# Patient Record
Sex: MALE | Race: WHITE | NOT HISPANIC OR LATINO | Employment: FULL TIME | URBAN - METROPOLITAN AREA
[De-identification: names, ages, dates, MRNs, and addresses within clinical notes are randomized per-mention and may not be internally consistent; named-entity substitution may affect disease eponyms.]

---

## 2022-01-27 ENCOUNTER — HOSPITAL ENCOUNTER (EMERGENCY)
Facility: MEDICAL CENTER | Age: 57
End: 2022-01-27
Payer: COMMERCIAL

## 2022-01-27 ENCOUNTER — OFFICE VISIT (OUTPATIENT)
Dept: URGENT CARE | Facility: CLINIC | Age: 57
End: 2022-01-27
Payer: COMMERCIAL

## 2022-01-27 VITALS
SYSTOLIC BLOOD PRESSURE: 140 MMHG | TEMPERATURE: 100.4 F | HEART RATE: 115 BPM | RESPIRATION RATE: 24 BRPM | OXYGEN SATURATION: 91 % | WEIGHT: 206 LBS | HEIGHT: 70 IN | BODY MASS INDEX: 29.49 KG/M2 | DIASTOLIC BLOOD PRESSURE: 80 MMHG

## 2022-01-27 DIAGNOSIS — R05.9 COUGH: ICD-10-CM

## 2022-01-27 DIAGNOSIS — R00.0 TACHYCARDIA: ICD-10-CM

## 2022-01-27 DIAGNOSIS — R06.00 DYSPNEA, UNSPECIFIED TYPE: ICD-10-CM

## 2022-01-27 DIAGNOSIS — R50.9 FEVER, UNSPECIFIED FEVER CAUSE: ICD-10-CM

## 2022-01-27 PROCEDURE — 99204 OFFICE O/P NEW MOD 45 MIN: CPT | Performed by: NURSE PRACTITIONER

## 2022-01-27 RX ORDER — INSULIN GLARGINE 100 [IU]/ML
20 INJECTION, SOLUTION SUBCUTANEOUS DAILY
COMMUNITY
End: 2022-06-04

## 2022-01-27 RX ORDER — LISINOPRIL 20 MG/1
40 TABLET ORAL DAILY
COMMUNITY
End: 2022-06-04

## 2022-01-27 ASSESSMENT — ENCOUNTER SYMPTOMS
FEVER: 0
CHILLS: 1
NAUSEA: 0
SHORTNESS OF BREATH: 1
COUGH: 1
DIZZINESS: 0
HEADACHES: 0
SORE THROAT: 0
VOMITING: 0

## 2022-01-27 ASSESSMENT — LIFESTYLE VARIABLES: SUBSTANCE_ABUSE: 0

## 2022-01-28 NOTE — PROGRESS NOTES
Reji Madrigal Jr. is a 56 y.o. male who presents for Cough (x 2 weeks, chest cold, cough, congestion)      HPI this new problem. Reji is a 56-year-old male patient presents with a cough for 2 weeks. Cough is been unchanged for the last 2 weeks. 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 He reports that he checks his temperature and a lot of the buildings and is never noted that he had a fever. He has been getting more short of breath with any type of activity. His cough is occasionally productive and worse over the last few days. He denies orthopnea, chest pain, leg swelling. He has no known exposure to persons with Covid infection, influenza or other infections. He is not vaccinated for COVID-19 viral illness. Treatments tried: None. He reports that his symptoms worsened today so he decided to come into urgent care because he can no longer tolerate his cough and mild dyspnea.    Review of Systems   Constitutional: Positive for chills and malaise/fatigue. Negative for fever.   HENT: Negative for sore throat.    Respiratory: Positive for cough and shortness of breath.    Cardiovascular: Negative for chest pain.   Gastrointestinal: Negative for nausea and vomiting.   Neurological: Negative for dizziness and headaches.   Endo/Heme/Allergies: Negative for environmental allergies.   Psychiatric/Behavioral: Negative for substance abuse.       Allergies:       Allergies   Allergen Reactions   • Penicillins Unspecified       PMSFS Hx:  History reviewed. No pertinent past medical history.  History reviewed. No pertinent surgical history.  History reviewed. No pertinent family history.  Social History     Tobacco Use   • Smoking status: Never Smoker   • Smokeless tobacco: Never Used   Substance Use Topics   • Alcohol use: Yes     Comment: occ       Problems:   There is no problem list on file for this patient.      Medications:   Current Outpatient Medications on File Prior to Visit  "  Medication Sig Dispense Refill   • METOPROLOL TARTRATE PO Take  by mouth.     • lisinopril (PRINIVIL) 20 MG Tab Take 40 mg by mouth every day.     • insulin glargine (LANTUS SOLOSTAR) 100 UNIT/ML Solution Pen-injector injection Inject 20 Units under the skin every day.     • metFORMIN (GLUCOPHAGE) 500 MG Tab Take 1,000 mg by mouth 2 times a day with meals.       No current facility-administered medications on file prior to visit.          Objective:     /80   Pulse (!) 112   Temp 38 °C (100.4 °F) (Temporal)   Resp 20   Ht 1.778 m (5' 10\")   Wt 93.4 kg (206 lb)   SpO2 91%   BMI 29.56 kg/m²     Physical Exam  Vitals and nursing note reviewed.   Constitutional:       General: He is in acute distress.      Appearance: He is well-developed and normal weight. He is not toxic-appearing.   HENT:      Head: Normocephalic and atraumatic.      Right Ear: External ear normal.      Left Ear: External ear normal.      Nose: Nose normal.   Eyes:      General:         Right eye: No discharge.         Left eye: No discharge.      Conjunctiva/sclera: Conjunctivae normal.      Pupils: Pupils are equal, round, and reactive to light.   Cardiovascular:      Rate and Rhythm: Normal rate and regular rhythm.   Pulmonary:      Effort: Tachypnea present.      Breath sounds: Decreased breath sounds present. No rhonchi.      Comments: Speaking in full sentences but with some noted mild dyspnea.   Chest:   Breasts:      Right: No supraclavicular adenopathy.      Left: No supraclavicular adenopathy.       Musculoskeletal:      Cervical back: Neck supple.   Lymphadenopathy:      Cervical: No cervical adenopathy.      Upper Body:      Right upper body: No supraclavicular adenopathy.      Left upper body: No supraclavicular adenopathy.   Skin:     General: Skin is warm and dry.      Capillary Refill: Capillary refill takes less than 2 seconds.   Neurological:      Mental Status: He is alert and oriented to person, place, and time. "   Psychiatric:         Mood and Affect: Mood normal.         Assessment /Associated Orders:      1. Cough     2. Fever, unspecified fever cause     3. Dyspnea, unspecified type     4. Tachycardia         Medical Decision Making:      Pt's clinical presentation and exam today indicate a need for higher level of care with further evaluation and/or diagnostics.  Imaging is not available in urgent care tonight. Concern for pt tachycardia, dyspnea, mild hypoxia 89% RA, low fever.  DD: covid pneumonia, CAP or other.   [facility] transfer center was  called to arrange transfer to higher level of care in ER.  Pt is to be transported via POV   I have reiterated to patient that although an Urgent Care to ER transfer was made this will not necessarily expedite the ER process

## 2022-06-03 ENCOUNTER — HOSPITAL ENCOUNTER (OUTPATIENT)
Facility: MEDICAL CENTER | Age: 57
End: 2022-06-20
Attending: EMERGENCY MEDICINE | Admitting: HOSPITALIST
Payer: COMMERCIAL

## 2022-06-03 DIAGNOSIS — I10 PRIMARY HYPERTENSION: ICD-10-CM

## 2022-06-03 DIAGNOSIS — Z79.4 TYPE 2 DIABETES MELLITUS WITH HYPERGLYCEMIA, WITH LONG-TERM CURRENT USE OF INSULIN (HCC): ICD-10-CM

## 2022-06-03 DIAGNOSIS — E11.65 TYPE 2 DIABETES MELLITUS WITH HYPERGLYCEMIA, WITH LONG-TERM CURRENT USE OF INSULIN (HCC): ICD-10-CM

## 2022-06-03 DIAGNOSIS — I63.9 CEREBROVASCULAR ACCIDENT (CVA), UNSPECIFIED MECHANISM (HCC): ICD-10-CM

## 2022-06-03 LAB
BASOPHILS # BLD AUTO: 0.4 % (ref 0–1.8)
BASOPHILS # BLD: 0.04 K/UL (ref 0–0.12)
EOSINOPHIL # BLD AUTO: 0.12 K/UL (ref 0–0.51)
EOSINOPHIL NFR BLD: 1.2 % (ref 0–6.9)
ERYTHROCYTE [DISTWIDTH] IN BLOOD BY AUTOMATED COUNT: 40.8 FL (ref 35.9–50)
HCT VFR BLD AUTO: 48.7 % (ref 42–52)
HGB BLD-MCNC: 16.7 G/DL (ref 14–18)
IMM GRANULOCYTES # BLD AUTO: 0.03 K/UL (ref 0–0.11)
IMM GRANULOCYTES NFR BLD AUTO: 0.3 % (ref 0–0.9)
LYMPHOCYTES # BLD AUTO: 3.12 K/UL (ref 1–4.8)
LYMPHOCYTES NFR BLD: 32.5 % (ref 22–41)
MCH RBC QN AUTO: 30.3 PG (ref 27–33)
MCHC RBC AUTO-ENTMCNC: 34.3 G/DL (ref 33.7–35.3)
MCV RBC AUTO: 88.4 FL (ref 81.4–97.8)
MONOCYTES # BLD AUTO: 0.9 K/UL (ref 0–0.85)
MONOCYTES NFR BLD AUTO: 9.4 % (ref 0–13.4)
NEUTROPHILS # BLD AUTO: 5.4 K/UL (ref 1.82–7.42)
NEUTROPHILS NFR BLD: 56.2 % (ref 44–72)
NRBC # BLD AUTO: 0 K/UL
NRBC BLD-RTO: 0 /100 WBC
PLATELET # BLD AUTO: 304 K/UL (ref 164–446)
PMV BLD AUTO: 11.2 FL (ref 9–12.9)
RBC # BLD AUTO: 5.51 M/UL (ref 4.7–6.1)
WBC # BLD AUTO: 9.6 K/UL (ref 4.8–10.8)

## 2022-06-03 PROCEDURE — 99285 EMERGENCY DEPT VISIT HI MDM: CPT

## 2022-06-03 PROCEDURE — 36415 COLL VENOUS BLD VENIPUNCTURE: CPT

## 2022-06-03 PROCEDURE — 85025 COMPLETE CBC W/AUTO DIFF WBC: CPT

## 2022-06-03 PROCEDURE — 80053 COMPREHEN METABOLIC PANEL: CPT

## 2022-06-04 PROBLEM — I63.9 CVA (CEREBRAL VASCULAR ACCIDENT) (HCC): Status: ACTIVE | Noted: 2022-06-04

## 2022-06-04 PROBLEM — E10.69 TYPE 1 DIABETES MELLITUS WITH OTHER SPECIFIED COMPLICATION (HCC): Status: ACTIVE | Noted: 2022-06-04

## 2022-06-04 PROBLEM — I69.351 HEMIPARESIS AFFECTING RIGHT SIDE AS LATE EFFECT OF STROKE (HCC): Status: ACTIVE | Noted: 2022-06-04

## 2022-06-04 PROBLEM — I10 PRIMARY HYPERTENSION: Status: ACTIVE | Noted: 2022-06-04

## 2022-06-04 LAB
ALBUMIN SERPL BCP-MCNC: 4 G/DL (ref 3.2–4.9)
ALBUMIN/GLOB SERPL: 1.2 G/DL
ALP SERPL-CCNC: 76 U/L (ref 30–99)
ALT SERPL-CCNC: 43 U/L (ref 2–50)
ANION GAP SERPL CALC-SCNC: 13 MMOL/L (ref 7–16)
AST SERPL-CCNC: 33 U/L (ref 12–45)
BILIRUB SERPL-MCNC: 0.5 MG/DL (ref 0.1–1.5)
BLOOD CULTURE HOLD CXBCH: NORMAL
BUN SERPL-MCNC: 17 MG/DL (ref 8–22)
CALCIUM SERPL-MCNC: 9.1 MG/DL (ref 8.5–10.5)
CHLORIDE SERPL-SCNC: 106 MMOL/L (ref 96–112)
CO2 SERPL-SCNC: 21 MMOL/L (ref 20–33)
CREAT SERPL-MCNC: 0.8 MG/DL (ref 0.5–1.4)
GFR SERPLBLD CREATININE-BSD FMLA CKD-EPI: 103 ML/MIN/1.73 M 2
GLOBULIN SER CALC-MCNC: 3.3 G/DL (ref 1.9–3.5)
GLUCOSE BLD STRIP.AUTO-MCNC: 158 MG/DL (ref 65–99)
GLUCOSE BLD STRIP.AUTO-MCNC: 163 MG/DL (ref 65–99)
GLUCOSE BLD STRIP.AUTO-MCNC: 224 MG/DL (ref 65–99)
GLUCOSE BLD STRIP.AUTO-MCNC: 269 MG/DL (ref 65–99)
GLUCOSE SERPL-MCNC: 223 MG/DL (ref 65–99)
POTASSIUM SERPL-SCNC: 3.7 MMOL/L (ref 3.6–5.5)
PROT SERPL-MCNC: 7.3 G/DL (ref 6–8.2)
SODIUM SERPL-SCNC: 140 MMOL/L (ref 135–145)

## 2022-06-04 PROCEDURE — 97163 PT EVAL HIGH COMPLEX 45 MIN: CPT

## 2022-06-04 PROCEDURE — 97166 OT EVAL MOD COMPLEX 45 MIN: CPT

## 2022-06-04 PROCEDURE — A9270 NON-COVERED ITEM OR SERVICE: HCPCS | Performed by: EMERGENCY MEDICINE

## 2022-06-04 PROCEDURE — 82962 GLUCOSE BLOOD TEST: CPT

## 2022-06-04 PROCEDURE — 700102 HCHG RX REV CODE 250 W/ 637 OVERRIDE(OP): Performed by: EMERGENCY MEDICINE

## 2022-06-04 PROCEDURE — 99204 OFFICE O/P NEW MOD 45 MIN: CPT | Performed by: INTERNAL MEDICINE

## 2022-06-04 PROCEDURE — 96372 THER/PROPH/DIAG INJ SC/IM: CPT

## 2022-06-04 RX ORDER — CLOPIDOGREL BISULFATE 75 MG/1
75 TABLET ORAL DAILY
Status: DISCONTINUED | OUTPATIENT
Start: 2022-06-04 | End: 2022-06-20 | Stop reason: HOSPADM

## 2022-06-04 RX ORDER — LOSARTAN POTASSIUM 50 MG/1
100 TABLET ORAL
Status: DISCONTINUED | OUTPATIENT
Start: 2022-06-04 | End: 2022-06-20 | Stop reason: HOSPADM

## 2022-06-04 RX ORDER — AMLODIPINE BESYLATE 10 MG/1
10 TABLET ORAL
Status: DISCONTINUED | OUTPATIENT
Start: 2022-06-04 | End: 2022-06-20 | Stop reason: HOSPADM

## 2022-06-04 RX ORDER — ATORVASTATIN CALCIUM 40 MG/1
80 TABLET, FILM COATED ORAL EVERY EVENING
Status: DISCONTINUED | OUTPATIENT
Start: 2022-06-04 | End: 2022-06-20 | Stop reason: HOSPADM

## 2022-06-04 RX ORDER — CARVEDILOL 12.5 MG/1
12.5 TABLET ORAL 2 TIMES DAILY WITH MEALS
Status: DISCONTINUED | OUTPATIENT
Start: 2022-06-04 | End: 2022-06-06

## 2022-06-04 RX ORDER — DOCUSATE SODIUM 50 MG/5ML
100 LIQUID ORAL 2 TIMES DAILY
Status: DISCONTINUED | OUTPATIENT
Start: 2022-06-04 | End: 2022-06-07

## 2022-06-04 RX ADMIN — CARVEDILOL 12.5 MG: 12.5 TABLET, FILM COATED ORAL at 10:54

## 2022-06-04 RX ADMIN — ATORVASTATIN CALCIUM 80 MG: 40 TABLET, FILM COATED ORAL at 18:55

## 2022-06-04 RX ADMIN — INSULIN HUMAN 6 UNITS: 100 INJECTION, SOLUTION PARENTERAL at 14:28

## 2022-06-04 RX ADMIN — INSULIN HUMAN 3 UNITS: 100 INJECTION, SOLUTION PARENTERAL at 18:56

## 2022-06-04 RX ADMIN — CLOPIDOGREL BISULFATE 75 MG: 75 TABLET ORAL at 10:54

## 2022-06-04 RX ADMIN — LOSARTAN POTASSIUM 100 MG: 50 TABLET, FILM COATED ORAL at 10:54

## 2022-06-04 RX ADMIN — INSULIN GLARGINE-YFGN 15 UNITS: 100 INJECTION, SOLUTION SUBCUTANEOUS at 11:00

## 2022-06-04 RX ADMIN — ASPIRIN 81 MG: 81 TABLET, COATED ORAL at 10:54

## 2022-06-04 RX ADMIN — CARVEDILOL 12.5 MG: 12.5 TABLET, FILM COATED ORAL at 18:55

## 2022-06-04 RX ADMIN — INSULIN HUMAN 3 UNITS: 100 INJECTION, SOLUTION PARENTERAL at 21:00

## 2022-06-04 RX ADMIN — AMLODIPINE BESYLATE 10 MG: 5 TABLET ORAL at 10:54

## 2022-06-04 ASSESSMENT — COGNITIVE AND FUNCTIONAL STATUS - GENERAL
DRESSING REGULAR UPPER BODY CLOTHING: A LITTLE
MOBILITY SCORE: 14
WALKING IN HOSPITAL ROOM: A LITTLE
SUGGESTED CMS G CODE MODIFIER DAILY ACTIVITY: CK
EATING MEALS: A LITTLE
SUGGESTED CMS G CODE MODIFIER MOBILITY: CL
DRESSING REGULAR LOWER BODY CLOTHING: A LOT
MOVING FROM LYING ON BACK TO SITTING ON SIDE OF FLAT BED: UNABLE
PERSONAL GROOMING: A LITTLE
CLIMB 3 TO 5 STEPS WITH RAILING: A LOT
DAILY ACTIVITIY SCORE: 15
MOVING TO AND FROM BED TO CHAIR: UNABLE
STANDING UP FROM CHAIR USING ARMS: A LITTLE
HELP NEEDED FOR BATHING: A LOT
TOILETING: A LOT

## 2022-06-04 ASSESSMENT — ENCOUNTER SYMPTOMS
DIZZINESS: 0
NAUSEA: 0
PALPITATIONS: 0
HEADACHES: 0
FEVER: 0
BRUISES/BLEEDS EASILY: 0
VOMITING: 0
HEMOPTYSIS: 0
NECK PAIN: 0
DEPRESSION: 0
BLURRED VISION: 0
WEIGHT LOSS: 0
SORE THROAT: 0
STRIDOR: 0
INSOMNIA: 0
COUGH: 0
MYALGIAS: 0
DOUBLE VISION: 0

## 2022-06-04 ASSESSMENT — GAIT ASSESSMENTS
DISTANCE (FEET): 5
GAIT LEVEL OF ASSIST: MODERATE ASSIST
ASSISTIVE DEVICE: HAND HELD ASSIST
DEVIATION: STEP TO;DECREASED BASE OF SUPPORT

## 2022-06-04 ASSESSMENT — ACTIVITIES OF DAILY LIVING (ADL): TOILETING: INDEPENDENT

## 2022-06-04 NOTE — CONSULTS
Hospital Medicine Consultation    Date of Service  6/4/2022    Referring Physician  Charmaine Bernstein D.O.    Consulting Physician  Nagi Bernal M.D.    Reason for Consultation  Medical management    History of Presenting Illness  56 y.o. male with history of diabetes, hypertension and stroke 5 days ago who presented 6/3/2022 for evaluation for post stroke care.  The patient states that he was admitted to [facility] on 30 May after he was found to have a stroke causing right-sided deficits.    The patient states has been in the hospital for about 5 days and left today as he was not happy with his care.  He states he has a known history of diabetes myelitis and he has been noncompliant with medication prior to the stroke.  He also is a known history of hypertension.  He states he did not receive thrombolytics and the treatment was medical management.  They are attempting to find the patient a rehabilitative facility.  He states his speech has been improving as well as some strength in the right upper and lower extremity.  However he still has profound loss of function to the right arm as well as weakness that has continued to the right leg.  He also some slight difficulty with speech and some right facial weakness.    Review of discharge summary.  Noncontrast MRI brain.  2 small areas of acute infarct left corona radiata.,  Previous right thalamic infarction.      Review of Systems  Review of Systems   Constitutional: Negative for fever, malaise/fatigue and weight loss.   HENT: Negative for sore throat and tinnitus.    Eyes: Negative for blurred vision and double vision.   Respiratory: Negative for cough, hemoptysis and stridor.    Cardiovascular: Negative for chest pain and palpitations.   Gastrointestinal: Negative for nausea and vomiting.   Genitourinary: Negative for dysuria and urgency.   Musculoskeletal: Negative for myalgias and neck pain.   Skin: Negative for itching and rash.    Neurological: Negative for dizziness and headaches.   Endo/Heme/Allergies: Does not bruise/bleed easily.   Psychiatric/Behavioral: Negative for depression. The patient does not have insomnia.        Past Medical History   has a past medical history of Diabetes ([facility]), Hypertension, and Stroke ([facility]).    Surgical History  Denies thoracoabdominal surgery    Family History  Hypertension    Social History   reports that he has never smoked. He has never used smokeless tobacco. He reports current alcohol use. He reports that he does not use drugs.    Medications  Discharge medications.    Norvasc 5 mg p.o. twice daily  Aspirin 324 mg p.o. daily,  Atorvastatin 80 mg p.o. nightly  Coreg 12.5 mg p.o. twice daily  Plavix 75 mg p.o. daily,  NovoLog insulin sliding scale before every meal,  Levemir 10 units subcu twice daily  Losartan 100 mg daily  Acetaminophen 650 mg p.o. every 8 hours as needed pain      Allergies  Allergies   Allergen Reactions   • Penicillins Unspecified       Physical Exam  Temp:  [35.9 °C (96.7 °F)] 35.9 °C (96.7 °F)  Pulse:  [66-77] 73  Resp:  [14-17] 15  BP: (137-180)/() 137/74  SpO2:  [94 %-99 %] 95 %    Physical Exam  Vitals and nursing note reviewed.   Constitutional:       General: He is not in acute distress.     Appearance: Normal appearance. He is normal weight. He is not toxic-appearing.   HENT:      Head: Normocephalic and atraumatic.      Nose: Nose normal. No congestion or rhinorrhea.      Mouth/Throat:      Mouth: Mucous membranes are moist.      Pharynx: Oropharynx is clear.   Eyes:      Extraocular Movements: Extraocular movements intact.      Conjunctiva/sclera: Conjunctivae normal.      Pupils: Pupils are equal, round, and reactive to light.   Neck:      Vascular: No carotid bruit.   Cardiovascular:      Rate and Rhythm: Normal rate and regular rhythm.      Pulses: Normal pulses.      Heart sounds: Normal heart sounds. No murmur heard.    No gallop.   Pulmonary:      Effort: No  respiratory distress.      Breath sounds: Normal breath sounds. No wheezing or rales.   Abdominal:      General: Abdomen is flat. Bowel sounds are normal. There is no distension.      Palpations: Abdomen is soft. There is no mass.      Tenderness: There is no abdominal tenderness.      Hernia: No hernia is present.   Musculoskeletal:         General: No swelling, tenderness, deformity or signs of injury.      Cervical back: Normal range of motion and neck supple. No muscular tenderness.   Lymphadenopathy:      Cervical: No cervical adenopathy.   Skin:     Capillary Refill: Capillary refill takes less than 2 seconds.      Coloration: Skin is not jaundiced or pale.      Findings: No bruising.   Neurological:      Mental Status: He is alert and oriented to person, place, and time.      Cranial Nerves: No cranial nerve deficit.      Motor: No weakness.      Coordination: Coordination normal.      Comments: Right upper extremity 4 out of 5 strength with deficits of fine motor skill.   Psychiatric:         Mood and Affect: Mood normal.         Thought Content: Thought content normal.         Judgment: Judgment normal.         Fluids      Laboratory  Recent Labs     06/03/22  2316   WBC 9.6   RBC 5.51   HEMOGLOBIN 16.7   HEMATOCRIT 48.7   MCV 88.4   MCH 30.3   MCHC 34.3   RDW 40.8   PLATELETCT 304   MPV 11.2     Recent Labs     06/03/22  2316   SODIUM 140   POTASSIUM 3.7   CHLORIDE 106   CO2 21   GLUCOSE 223*   BUN 17   CREATININE 0.80   CALCIUM 9.1                     Imaging      Assessment/Plan  Type 1 diabetes mellitus with other specified complication (HCC)  Assessment & Plan  Continue Levemir 10 units subcu twice daily   Humalog insulin sliding scale before every meal    Primary hypertension  Assessment & Plan  Continue Norvasc, Coreg, losartan    CVA (cerebral vascular accident) (HCC)  Assessment & Plan  Continue atorvastatin, aspirin and Plavix    Hemiparesis affecting right side as late effect of stroke  (HCC)  Assessment & Plan  Follow-up physical therapy evaluation and consideration rehab

## 2022-06-04 NOTE — ED NOTES
Informed by social work that Pt's insurance cannot be validated by physical therapy until Monday for home PT set up. Pt is to remain under ED Obs until that time due to floors denying admission.

## 2022-06-04 NOTE — THERAPY
Physical Therapy   Initial Evaluation     Patient Name: Reji Madrigal Jr.  Age:  56 y.o., Sex:  male  Medical Record #: [id number]  Today's Date: 6/4/2022     Precautions  Precautions: Fall Risk    Assessment  Pt presents with impaired activity tolerance, dynamic balance and strength associated with reports of CVA at outside hospital. Pt reporting initial speech difficulties that have vastly improved over the last 5 days as well as right sided deficits. Hyperverbose and distractable but following all commands and reports he feels like himself cognitively. Pt has no sensory deficits noted aside from dorsal surface of both feet from DM neuropathy at baseline, does also have left great toenail bruising but denies trauma. Right LE proximal muscle weakness greater than distal, UE is opposite with shoulder intact and at least trace motor control available elbow distal. Able to facilitate ambulation at mod A 5ft x 2 with fatigue reported; Pt is eager to recover and progress as he works as an ; his SO is from out of town but able to provide assist intermediately. Recommend placement, will follow.     Plan    Recommend Physical Therapy 5 times per week until therapy goals are met for the following treatments:  Bed Mobility, Equipment, Gait Training, Manual Therapy, Neuro Re-Education / Balance, Self Care/Home Evaluation, Sensory Integration Techniques, Stair Training, Therapeutic Activities, and Therapeutic Exercises       Abridged Subjective/Objective       06/04/22 1215   Prior Living Situation   Prior Services Home-Independent   Housing / Facility 1 Story House   Steps Into Home 4   Equipment Owned None   Lives with - Patient's Self Care Capacity Alone and Able to Care For Self   Comments denies prior mobility issues; has a girlfriend from [facility] in [facility] to assist for a while; works as an arcitect   Prior Level of Functional Mobility   Bed Mobility Independent   Transfer Status Independent   Ambulation  Independent   Distance Ambulation (Feet)   (to tolerance)   Assistive Devices Used None   Stairs Independent   Cognition    Cognition / Consciousness X   Level of Consciousness Alert   Safety Awareness Impaired   New Learning Impaired   Attention Impaired   Passive ROM Lower Body   Passive ROM Lower Body WDL   Strength Lower Body   Lower Body Strength  X   Comments left LE: WNL; right hip flexion 3-/5, knee extension 3+/5, knee flexion 4/5, ankle DF 4/5;   Sensation Lower Body   Lower Extremity Sensation   WDL   Comments full intact per his subjective reports aside form both tops of feet being numb from DM, not the CVA and light touch confirmation   Vision   Vision Comments denies   Balance Assessment   Sitting Balance (Static) Fair +   Sitting Balance (Dynamic) Fair   Standing Balance (Static) Poor +   Standing Balance (Dynamic) Poor   Weight Shift Sitting Poor   Weight Shift Standing Poor   Comments B UE support in sitting/standing; needing cues and support for right UE   Gait Analysis   Gait Level Of Assist Moderate Assist   Assistive Device Hand Held Assist   Distance (Feet) 5   # of Times Distance was Traveled 2   Deviation Step To;Decreased Base Of Support   Weight Bearing Status full   Vision Deficits Impacting Mobility denies   Comments distance limited by therapist, to and from sink   Bed Mobility    Supine to Sit Minimal Assist   Sit to Supine Minimal Assist  (for right LE)   Functional Mobility   Sit to Stand Minimal Assist  (with B HHA)   Bed, Chair, Wheelchair Transfer Refused   Edema / Skin Assessment   Edema / Skin  X   Comments has a right great toe bruise that looks friction or if he dropped something on his toe, denies;   Patient / Family Goals    Patient / Family Goal #1 to improve amb goals   Short Term Goals    Short Term Goal # 1 Pt will perform supine<>sit from flat HOB/no railing with supervision within 6 visits to ensure independent mobiltiy at home.   Short Term Goal # 2 Pt will perform  sit<>stand with FWW and supervision within 6 visits to ensure independent mobility at home.   Short Term Goal # 3 Pt will ambulate x 150ft with FWW and supervision within 6 visits to ensure independent mobility at home.   Short Term Goal # 4 Pt will ascend/descend 4 stairs with B UE support and supervision within 6 visits to ensure independent mobility at home.   Education Group   Role of Physical Therapist Patient Response Patient;Significant Other;Acceptance;Explanation;Demonstration;Verbal Demonstration;Action Demonstration   Additional Comments encouragement of ROM of right side of body and mental imagery of repetition of ROM

## 2022-06-04 NOTE — ED NOTES
Pt resting in room, easy, equal chest rise and fall. Pt remains calm and cooperative. Significant other at bedside. Pt provided hospital prepared meal

## 2022-06-04 NOTE — ED PROVIDER NOTES
ED Provider Note    CHIEF COMPLAINT  Chief Complaint   Patient presents with   • Other     Pt suffered a stroke on Monday 5/30 and was admitted to [facility] where he has remained in patient up until approximately an hour ago. Pt elected to leave AMA from their facility. Presents to us today with the hope that we can coordinate his transfer to a rehab facility.        HPI  Reji Madrigal Jr. is a 56 y.o. male who presents for evaluation for stroke.  The patient states that he was admitted to [facility] on 30 May after he was found to have a stroke causing right-sided deficits.  The patient states has been in the hospital for about 5 days and left today as he was not happy with his care.  He states he has a known history of diabetes myelitis and he has been noncompliant with medication prior to the stroke.  He also is a known history of hypertension.  He states he did not receive thrombolytics and the treatment was medical management.  They are attempting to find the patient a rehabilitative facility.  He states his speech has been improving as well as some strength in the right upper and lower extremity.  However he still has profound loss of function to the right arm as well as weakness that has continued to the right leg.  He also some slight difficulty with speech and some right facial weakness.    REVIEW OF SYSTEMS  See HPI for further details. All other systems are negative.     PAST MEDICAL HISTORY  Past Medical History:   Diagnosis Date   • Diabetes (HCC)    • Hypertension    • Stroke (HCC)        FAMILY HISTORY  [unfilled]    SOCIAL HISTORY  Social History     Socioeconomic History   • Marital status: Unknown   Tobacco Use   • Smoking status: Never Smoker   • Smokeless tobacco: Never Used   Vaping Use   • Vaping Use: Never used   Substance and Sexual Activity   • Alcohol use: Yes     Comment: occ   • Drug use: Never       SURGICAL HISTORY  No past surgical history on file.    CURRENT  "MEDICATIONS  Home Medications    **Home medications have not yet been reviewed for this encounter**         ALLERGIES  Allergies   Allergen Reactions   • Penicillins Unspecified       PHYSICAL EXAM  VITAL SIGNS: BP (!) 180/110   Pulse 77   Temp 35.9 °C (96.7 °F) (Temporal)   Resp 14   Ht 1.778 m (5' 10\")   Wt 93 kg (205 lb)   SpO2 99% Comment: RA  BMI 29.41 kg/m²       Constitutional: Ill in appearance  HENT: Normocephalic, Atraumatic, Bilateral external ears normal, Oropharynx moist, No oral exudates, Nose normal.   Eyes: PERRLA, EOMI, Conjunctiva normal, No discharge.   Neck: Normal range of motion, No tenderness, Supple, No stridor.   Lymphatic: No lymphadenopathy noted.   Cardiovascular: Normal heart rate, Normal rhythm, No murmurs, No rubs, No gallops.   Thorax & Lungs: Normal breath sounds, No respiratory distress, No wheezing, No chest tenderness.   Abdomen: Bowel sounds normal, Soft, No tenderness, No masses, No pulsatile masses.   Skin: Warm, Dry, No erythema, No rash.   Back: No tenderness, No CVA tenderness.    Extremities: Intact distal pulses, No edema, No tenderness, No cyanosis, No clubbing.    Neurologic: Alert & oriented x 3, 3 out of 5 strength of the right upper extremity and 4 out of 5 strength to the right lower extremity extremities otherwise intact neurologically, Normal sensory function, slight right facial weakness and dysarthria  Psychiatric: Affect normal, Judgment normal, Mood normal.     Results for orders placed or performed during the hospital encounter of 06/03/22   CBC WITH DIFFERENTIAL   Result Value Ref Range    WBC 9.6 4.8 - 10.8 K/uL    RBC 5.51 4.70 - 6.10 M/uL    Hemoglobin 16.7 14.0 - 18.0 g/dL    Hematocrit 48.7 42.0 - 52.0 %    MCV 88.4 81.4 - 97.8 fL    MCH 30.3 27.0 - 33.0 pg    MCHC 34.3 33.7 - 35.3 g/dL    RDW 40.8 35.9 - 50.0 fL    Platelet Count 304 164 - 446 K/uL    MPV 11.2 9.0 - 12.9 fL    Neutrophils-Polys 56.20 44.00 - 72.00 %    Lymphocytes 32.50 22.00 - " 41.00 %    Monocytes 9.40 0.00 - 13.40 %    Eosinophils 1.20 0.00 - 6.90 %    Basophils 0.40 0.00 - 1.80 %    Immature Granulocytes 0.30 0.00 - 0.90 %    Nucleated RBC 0.00 /100 WBC    Neutrophils (Absolute) 5.40 1.82 - 7.42 K/uL    Lymphs (Absolute) 3.12 1.00 - 4.80 K/uL    Monos (Absolute) 0.90 (H) 0.00 - 0.85 K/uL    Eos (Absolute) 0.12 0.00 - 0.51 K/uL    Baso (Absolute) 0.04 0.00 - 0.12 K/uL    Immature Granulocytes (abs) 0.03 0.00 - 0.11 K/uL    NRBC (Absolute) 0.00 K/uL   COMP METABOLIC PANEL   Result Value Ref Range    Sodium 140 135 - 145 mmol/L    Potassium 3.7 3.6 - 5.5 mmol/L    Chloride 106 96 - 112 mmol/L    Co2 21 20 - 33 mmol/L    Anion Gap 13.0 7.0 - 16.0    Glucose 223 (H) 65 - 99 mg/dL    Bun 17 8 - 22 mg/dL    Creatinine 0.80 0.50 - 1.40 mg/dL    Calcium 9.1 8.5 - 10.5 mg/dL    AST(SGOT) 33 12 - 45 U/L    ALT(SGPT) 43 2 - 50 U/L    Alkaline Phosphatase 76 30 - 99 U/L    Total Bilirubin 0.5 0.1 - 1.5 mg/dL    Albumin 4.0 3.2 - 4.9 g/dL    Total Protein 7.3 6.0 - 8.2 g/dL    Globulin 3.3 1.9 - 3.5 g/dL    A-G Ratio 1.2 g/dL   ESTIMATED GFR   Result Value Ref Range    GFR (CKD-EPI) 103 >60 mL/min/1.73 m 2       COURSE & MEDICAL DECISION MAKING  Pertinent Labs & Imaging studies reviewed. (See chart for details)  This a 56-year-old male who presents to the emergency department after he left [facility] AGAINST MEDICAL ADVICE from treatment of a stroke.  The patient symptoms started on the 30th and he is not a candidate for any aggressive management at this time.  He will require medical management as well as further rehabilitation.  I have ordered Elsie from [facility] and the patient admitted to the hospital for further treatment and placement for rehabilitation.    FINAL IMPRESSION  1.  Right-sided weakness  2.  Status post CVA    Disposition  The patient will be admitted in stable condition         Electronically signed by: Javed Adhikari M.D., 6/3/2022 10:51  PM    Is an addendum to my dictation.  According to the hospitalist the patient did not meet criteria for admission and therefore he will be admitted to the emergency department under observation status for further physical therapy, Occupational Therapy, and transfer to a rehabilitative facility.  The hospitalist did consult on the case and has started the patient on his medication and to continue treatment for his subacute CVA.    The patient will be admitted to ED observation on 6/3/2022 at 11:45 PM

## 2022-06-04 NOTE — ED NOTES
Pt brought to room via w/c. Dressed in gown. Placed on monitor. Call light in reach. Up for ERP to see.

## 2022-06-04 NOTE — ED NOTES
Pt resting in room, easy, equal chest rise and fall. Pt remains calm and cooperative. Significant other at bedside.

## 2022-06-04 NOTE — DISCHARGE PLANNING
Called [facility] and spoke with care coordination assistant Radha who advised me that patient was at [facility] before leaving [place], he did not want to go to [facility], he chose [facility], a referral was sent on 6/3/22.  Called [facility] and spoke to Lianne in admissions who confirmed that a referral was sent yesterday by [facility] but when the patient left [facility] the referral was cancelled. Lianne stated that a new referral will need to be sent with new therapy notes. Met with patient to discuss d/c planning, his significant other Monik Newman was in the room. Discussed choice of acute rehab hospitals with the patient, he still chose [facility], choice form signed by patient. Discussed alternate levels of care with the patient SNF and HH in the event that acute inpatient rehab is not an option for the patient based on therapy notes, insurance approval or referral declined by [facility]. Patient refused to go to SNF, he said he lives alone and has no family to assist him at home, his girlfriend lives in [place] so she won't be able to stay to help him.  Advised patient that the referral to [facility] was cancelled when he left [facility] so [facility] needs a new referral and new PT/OT notes. Prior to having the stroke patient lived alone and was independent with all mobility and ADL's no DME required.  Patient requests that his girlfriend Monik be listed as his primary emergency contact and his son Vignesh Madrigal be listed as secondary contact but in the case of medical emergency his son Vignesh is who he designates as his decision maker if he's unable to speak for himself.  Patient is currently  going through a divorce and reports not having good relations with his soon to be ex-wife.  Patient does not have a PCP.    Advised patient that a referral to [facility] will be initiated today,  still pending OT eval.  Patient understands that even if [facility] accepts the patient for admission, they will need insurance authorization.    Referral sent to [facility]

## 2022-06-04 NOTE — ED NOTES
Swallow test performed, patient had no difficulties and didn't display any dysphagia or cough throughout.

## 2022-06-04 NOTE — ED TRIAGE NOTES
"Reji Madrigal Jr.  56 y.o. male  Chief Complaint   Patient presents with   • Other     Pt suffered a stroke on Monday 5/30 and was admitted to [facility] where he has remained in patient up until approximately an hour ago. Pt elected to leave AMA from their facility. Presents to us today with the hope that we can coordinate his transfer to a rehab facility.        Vitals:    06/03/22 2117   BP: (!) 180/110   Pulse: 77   Resp: 14   Temp: 35.9 °C (96.7 °F)   SpO2: 99%     Pt left AMA from [facility] after a reported CVA with significant right sided deficits. Unclear whether or not pt actually informed the hospital staff of his intention to AMA, or if he just snuck out of the hospital. Pt still has a PIV intact and is unable to walk..... Pt is accompanied by a significant other who transported him here. Pt and significant other endorse that their experience at [facility] was, \"intolerable,\" and they felt the staff was not adequately assisting them to accomplish their goals, primarily getting to a rehab facility.     Patient educated on triage process and encouraged to alert staff of any changes in condition.    "

## 2022-06-04 NOTE — PROGRESS NOTES
"  ED Observation Progress Note    Date of Service: 06/04/22    Interval History  5:50 AM plan had been for admission to the hospital by prior ERP.  However, after evaluation by the hospitalist, patient does not meet criteria.  Case management has been involved and are recommending PT/OT evaluation.  I have reviewed hospitalist medicine note.  Patient has a history of diabetes, hypertension and report of a stroke 5 days ago.  He was admitted to the [facility] on May 30.  Hospitalized for 5 days.  Apparently, per hospital note, the was an attempt to find a patient rehab facility.  Patient reported that his speech had been improving but he had persistent, profound loss of function in his right upper extremity and right leg.  He also has had speech changes and right facial weakness.    No events under my care.  Patient medications have been reconciled after discussion with pharmacy and restarted.  Still awaiting safe disposition.  Care will be transferred to oncoming ERP.    Physical Exam  BP (!) 178/90   Pulse 95   Temp 35.9 °C (96.7 °F) (Temporal)   Resp (!) 30   Ht 1.803 m (5' 11\")   Wt 93 kg (205 lb)   SpO2 92%   BMI 28.59 kg/m² .    Constitutional: Awake and alert. Nontoxic  HENT:  Grossly normal  Eyes: Grossly normal  Neck: Normal range of motion  Cardiovascular: Normal heart rate   Thorax & Lungs: No respiratory distress  Abdomen: Nontender  Skin:  No pathologic rash.   Extremities: Well perfused  Psychiatric: Affect normal    Labs  Results for orders placed or performed during the hospital encounter of 06/03/22   CBC WITH DIFFERENTIAL   Result Value Ref Range    WBC 9.6 4.8 - 10.8 K/uL    RBC 5.51 4.70 - 6.10 M/uL    Hemoglobin 16.7 14.0 - 18.0 g/dL    Hematocrit 48.7 42.0 - 52.0 %    MCV 88.4 81.4 - 97.8 fL    MCH 30.3 27.0 - 33.0 pg    MCHC 34.3 33.7 - 35.3 g/dL    RDW 40.8 35.9 - 50.0 fL    Platelet Count 304 164 - 446 K/uL    MPV 11.2 9.0 - 12.9 fL    Neutrophils-Polys 56.20 44.00 - " 72.00 %    Lymphocytes 32.50 22.00 - 41.00 %    Monocytes 9.40 0.00 - 13.40 %    Eosinophils 1.20 0.00 - 6.90 %    Basophils 0.40 0.00 - 1.80 %    Immature Granulocytes 0.30 0.00 - 0.90 %    Nucleated RBC 0.00 /100 WBC    Neutrophils (Absolute) 5.40 1.82 - 7.42 K/uL    Lymphs (Absolute) 3.12 1.00 - 4.80 K/uL    Monos (Absolute) 0.90 (H) 0.00 - 0.85 K/uL    Eos (Absolute) 0.12 0.00 - 0.51 K/uL    Baso (Absolute) 0.04 0.00 - 0.12 K/uL    Immature Granulocytes (abs) 0.03 0.00 - 0.11 K/uL    NRBC (Absolute) 0.00 K/uL   COMP METABOLIC PANEL   Result Value Ref Range    Sodium 140 135 - 145 mmol/L    Potassium 3.7 3.6 - 5.5 mmol/L    Chloride 106 96 - 112 mmol/L    Co2 21 20 - 33 mmol/L    Anion Gap 13.0 7.0 - 16.0    Glucose 223 (H) 65 - 99 mg/dL    Bun 17 8 - 22 mg/dL    Creatinine 0.80 0.50 - 1.40 mg/dL    Calcium 9.1 8.5 - 10.5 mg/dL    AST(SGOT) 33 12 - 45 U/L    ALT(SGPT) 43 2 - 50 U/L    Alkaline Phosphatase 76 30 - 99 U/L    Total Bilirubin 0.5 0.1 - 1.5 mg/dL    Albumin 4.0 3.2 - 4.9 g/dL    Total Protein 7.3 6.0 - 8.2 g/dL    Globulin 3.3 1.9 - 3.5 g/dL    A-G Ratio 1.2 g/dL   ESTIMATED GFR   Result Value Ref Range    GFR (CKD-EPI) 103 >60 mL/min/1.73 m 2   Blood Culture,Hold   Result Value Ref Range    Blood Culture Hold Collected    POCT glucose device results   Result Value Ref Range    POC Glucose, Blood 269 (H) 65 - 99 mg/dL     Problem List  1.  Status post CVA  2.  Right-sided weakness  3.  Diabetes  4.  Hypertension      Electronically signed by: Charmaine Bernstein D.O., 6/4/2022 12:29 PM

## 2022-06-04 NOTE — ED NOTES
Patient is asleep in bed in no acute distress. Woke easily when nurse entered room, denies any needs at this time other than sleep.    Please inform patient she will have to wait until after physical therapy as we will retry the authorization for the MRI, if her pain is worsening we may have to have her get the MRI done with the specialist that I have referred her to

## 2022-06-04 NOTE — PROGRESS NOTES
Patient's home medications have been reviewed by the pharmacy team.     Past Medical History:   Diagnosis Date   • Diabetes (HCC)    • Hypertension    • Stroke (HCC)        Patient's Medications   New Prescriptions    No medications on file   Previous Medications    No medications on file   Modified Medications    No medications on file   Discontinued Medications    INSULIN GLARGINE (LANTUS SOLOSTAR) 100 UNIT/ML SOLUTION PEN-INJECTOR INJECTION    Inject 20 Units under the skin every day.    LISINOPRIL (PRINIVIL) 20 MG TAB    Take 40 mg by mouth every day.    METFORMIN (GLUCOPHAGE) 500 MG TAB    Take 1,000 mg by mouth 2 times a day with meals.    METOPROLOL TARTRATE PO    Take  by mouth.          A:  Medications do not appear to be contributing to current complaints as patient not currently taking any medications.    P:    No recommendations at this time.     Kit Spencer, PharmD

## 2022-06-04 NOTE — ASSESSMENT & PLAN NOTE
Continue aspirin Plavix (plavix can be stopped 21 days after event, so around 6/24)  Continue atorvastatin  Blood pressure control  See above  Highly motivated individual

## 2022-06-04 NOTE — ED NOTES
Pt found on ground at foot of bed calling out for help. Pt stated to RN and ED tech that he was trying to use the urinal by himself when he fell. Pt's significant other not at bedside at time. Pt denies hitting head. Pt stated he didn't want to use the call light because last time no one came for him. Pt helped back into bed with RN and ED tech assistance and placed back on monitor. Pt instructed on importance of using call light in times of need such as this. Pt stated to RN that he will use the call light next time.

## 2022-06-04 NOTE — ASSESSMENT & PLAN NOTE
Continue aspirin and statin  RUE is worse than RLE (especially the hand), is R hand dominant  Blood pressure control  Re-evaluate with therapies on 6/20, IRF versus SNF

## 2022-06-04 NOTE — ED NOTES
Patient asleep in bed. No needs at this time. Case management contacted to ascertain the plan for this patient regarding admission.

## 2022-06-05 LAB
GLUCOSE BLD STRIP.AUTO-MCNC: 152 MG/DL (ref 65–99)
GLUCOSE BLD STRIP.AUTO-MCNC: 168 MG/DL (ref 65–99)
GLUCOSE BLD STRIP.AUTO-MCNC: 183 MG/DL (ref 65–99)
GLUCOSE BLD STRIP.AUTO-MCNC: 184 MG/DL (ref 65–99)

## 2022-06-05 PROCEDURE — 700102 HCHG RX REV CODE 250 W/ 637 OVERRIDE(OP): Performed by: EMERGENCY MEDICINE

## 2022-06-05 PROCEDURE — A9270 NON-COVERED ITEM OR SERVICE: HCPCS | Performed by: EMERGENCY MEDICINE

## 2022-06-05 PROCEDURE — 96372 THER/PROPH/DIAG INJ SC/IM: CPT

## 2022-06-05 PROCEDURE — 82962 GLUCOSE BLOOD TEST: CPT | Mod: 91

## 2022-06-05 RX ADMIN — INSULIN GLARGINE-YFGN 15 UNITS: 100 INJECTION, SOLUTION SUBCUTANEOUS at 07:46

## 2022-06-05 RX ADMIN — LOSARTAN POTASSIUM 100 MG: 50 TABLET, FILM COATED ORAL at 06:47

## 2022-06-05 RX ADMIN — ASPIRIN 81 MG: 81 TABLET, COATED ORAL at 06:47

## 2022-06-05 RX ADMIN — CLOPIDOGREL BISULFATE 75 MG: 75 TABLET ORAL at 06:46

## 2022-06-05 RX ADMIN — INSULIN HUMAN 3 UNITS: 100 INJECTION, SOLUTION PARENTERAL at 20:00

## 2022-06-05 RX ADMIN — INSULIN HUMAN 3 UNITS: 100 INJECTION, SOLUTION PARENTERAL at 15:06

## 2022-06-05 RX ADMIN — CARVEDILOL 12.5 MG: 12.5 TABLET, FILM COATED ORAL at 07:48

## 2022-06-05 RX ADMIN — AMLODIPINE BESYLATE 10 MG: 5 TABLET ORAL at 06:47

## 2022-06-05 RX ADMIN — INSULIN HUMAN 3 UNITS: 100 INJECTION, SOLUTION PARENTERAL at 22:24

## 2022-06-05 RX ADMIN — CARVEDILOL 12.5 MG: 12.5 TABLET, FILM COATED ORAL at 19:55

## 2022-06-05 RX ADMIN — INSULIN HUMAN 3 UNITS: 100 INJECTION, SOLUTION PARENTERAL at 07:47

## 2022-06-05 RX ADMIN — ATORVASTATIN CALCIUM 80 MG: 40 TABLET, FILM COATED ORAL at 19:55

## 2022-06-05 NOTE — THERAPY
"Occupational Therapy   Initial Evaluation     Patient Name: Reji Madrigal Jr.  Age:  56 y.o., Sex:  male  Medical Record #: [id number]  Today's Date: 6/4/2022     Precautions  Precautions: Fall Risk    Assessment  Patient is 56 y.o. male who presents to acute for stroke that occurred 4 days ago after leaving [facility]. CVA impacted primarily his R UE (dominant hand) at forearm, wrist and digits, pt has 2/5 strength in forearm, trace strength in wrist, 2/5 digit flexion and trace digit extension. Pt required min A for standing grooming and LB dressing, mod A for functional mobility w/ B UE support. Will continue to follow while in house.   Plan    Recommend Occupational Therapy 4 times per week until therapy goals are met for the following treatments:  Adaptive Equipment, Neuro Re-Education / Balance, Self Care/Activities of Daily Living, Therapeutic Activities, and Therapeutic Exercises.    DC Equipment Recommendations: (P) Unable to determine at this time  Discharge Recommendations: (P) Recommend post-acute placement for additional occupational therapy services prior to discharge home     Subjective    \"I'm from [place] and the [place] are playing!\"     Objective       06/04/22 1216   Charge Group   OT Evaluation OT Evaluation Mod   Total Time Spent   OT Time Spent Yes   OT Evaluation (Minutes) 30   OT Total Time Spent (Calculated) 30   Initial Contact Note    Initial Contact Note Order Received and Verified, Occupational Therapy Evaluation in Progress with Full Report to Follow.   Prior Living Situation   Prior Services Home-Independent   Housing / Facility 2 Story Apartment / Condo   Bathroom Set up Bathtub / Shower Combination   Equipment Owned None   Lives with - Patient's Self Care Capacity Alone and Able to Care For Self   Comments reports his GF lives in [facility] but is in town to assist following CVA   Prior Level of ADL Function   Self Feeding Independent   Grooming / Hygiene " Independent   Bathing Independent   Dressing Independent   Toileting Independent   Prior Level of IADL Function   Medication Management Independent   Laundry Independent   Kitchen Mobility Independent   Finances Independent   Home Management Independent   Shopping Independent   Prior Level Of Mobility Independent Without Device in Community;Independent Without Device in Home   Driving / Transportation Driving Independent   Occupation (Pre-Hospital Vocational) Employed Full Time  ()   Precautions   Precautions Fall Risk   Vitals   O2 Delivery Device None - Room Air   Cognition    Cognition / Consciousness X   Level of Consciousness Alert   Safety Awareness Impaired   New Learning Impaired   Attention Impaired   Comments pleasent, cooperative, difficulty w/ divided attention, hyperverbose   Active ROM Upper Body   Active ROM Upper Body  X   Dominant Hand Right   Comments no AROM for R wrist ext/flexion, digit extension   Strength Upper Body   Comments Pronation supination: 2+/5, wrist flaccid, 3/5 digit flexors, 1/5 digit extensors   Coordination Upper Body   Coordination X   Fine Motor Coordination impaired R UE   Gross Motor Coordination impaired R UE   Comments Pt unable to release objects   Balance Assessment   Sitting Balance (Static) Fair +   Sitting Balance (Dynamic) Fair   Standing Balance (Static) Poor +   Standing Balance (Dynamic) Poor   Weight Shift Sitting Poor   Weight Shift Standing Poor   Comments B UE support sitting/standing   Bed Mobility    Supine to Sit Minimal Assist   Sit to Supine Minimal Assist   Scooting Supervised   ADL Assessment   Grooming Minimal Assist;Standing  (washed hands at sink)   Lower Body Dressing Minimal Assist  (donned shoes, likely requires increased assist for donning underpants/pants)   How much help from another person does the patient currently need...   Putting on and taking off regular lower body clothing? 2   Bathing (including washing, rinsing, and drying)? 2    Toileting, which includes using a toilet, bedpan, or urinal? 2   Putting on and taking off regular upper body clothing? 3   Taking care of personal grooming such as brushing teeth? 3   Eating meals? 3   6 Clicks Daily Activity Score 15   Functional Mobility   Sit to Stand Minimal Assist   Mobility within room w/ B UE support and Mod A   Activity Tolerance   Sitting Edge of Bed 10 min   Standing 5 min   Patient / Family Goals   Patient / Family Goal #1 To regain the use of his R hand   Short Term Goals   Short Term Goal # 1 Pt will demo LB dressing w/ SPV   Short Term Goal # 2 Pt will grasp and release 3 objects w/ SPV using R hand   Short Term Goal # 3 Pt will demo standing grooming w/ SPV   Short Term Goal # 4 Pt will increase strength in hand extensors to 3/5   Education Group   Role of Occupational Therapist Patient Response Patient;Acceptance;Explanation;Demonstration;Verbal Demonstration;Action Demonstration   Problem List   Problem List Decreased Active Daily Living Skills;Decreased Homemaking Skills;Decreased Functional Mobility;Decreased Activity Tolerance;Safety Awareness Deficits / Cognition;Decreased Upper Extremity AROM Right;Decreased Upper Extremity Strength Right;Impaired Postural Control / Balance   Anticipated Discharge Equipment and Recommendations   DC Equipment Recommendations Unable to determine at this time   Discharge Recommendations Recommend post-acute placement for additional occupational therapy services prior to discharge home   Interdisciplinary Plan of Care Collaboration   IDT Collaboration with  Nursing   Patient Position at End of Therapy In Bed;Call Light within Reach;Tray Table within Reach;Phone within Reach   Collaboration Comments report given   Session Information   Date / Session Number  6/4, 1 (1/4, 6/10)   Priority 2

## 2022-06-05 NOTE — ED NOTES
Break RN: Pt resting comfortably on gurney, equal chest rise and fall bilaterally. RN in direct view of patient. No identifiable needs at this time. Pending transfer.

## 2022-06-05 NOTE — DISCHARGE PLANNING
Note:  RN CM called [facility] to follow up on referral. RN CM was informed that there is no TCC available today. Hand off given to RN CM on duty tomorrow.

## 2022-06-05 NOTE — DISCHARGE PLANNING
CM received call from pt's friend (Alessandra Belle [phone]) asking how she can help him get disability.  In general, without discussing specifics of pt, she was told she could call Social Security on his behalf and inquire to their process. She also asked how rehab worked and, in general, CM explained the process of how post acute referrals are handled.

## 2022-06-05 NOTE — ED NOTES
"Pt denies suicidal thoughts, \"yoni never had thoughts like that, I just told her that.\" Pt states \"what would I even do? Stab myself with an arm that barely works?\" pt irritable, placed back into bed.   "

## 2022-06-05 NOTE — ED NOTES
Pt placed on hospital bed for comfort, pt able to scoot self to bed without assistance. Bed in lowest position, side rails up, pillow and warm blankets provided, call light in reach, denies any needs at this time.

## 2022-06-05 NOTE — ED NOTES
Pt to [facility], report from Supriya NIÑO. Neuro assessment completed. Pt A&Ox4, GCS 15. No distress noted. Pending placement.

## 2022-06-05 NOTE — PROGRESS NOTES
"ED Observation Progress Note    Date of Service: 06/05/22    Interval History  Patient has a history of a recent stroke, weakness on right lower extremity. He left the hospital and came here to request an OT PT. He still has complaints of right upper lower extremity weakness, and did not meet the criteria for admission. So, he remains on ER observation status. Patient is pending acceptance in physical therapy/rehab center.    Physical Exam  BP (!) 158/86   Pulse 80   Temp 35.9 °C (96.7 °F) (Temporal)   Resp 16   Ht 1.803 m (5' 11\")   Wt 93 kg (205 lb)   SpO2 99%   BMI 28.59 kg/m² .    Constitutional: Awake and alert. Nontoxic  HENT: Atraumatic  Eyes: Without icterus  Neck: Supple  Cardiovascular: Normal heart rate   Thorax & Lungs: Breathing easily  Abdomen: Nontender  Skin:  No visible rash  Extremities: Well perfused  Neurologic: Right upper and lower extremity weakness with coordination deficits in those areas  Psychiatric: Affect normal      Labs  Results for orders placed or performed during the hospital encounter of 06/03/22   CBC WITH DIFFERENTIAL   Result Value Ref Range    WBC 9.6 4.8 - 10.8 K/uL    RBC 5.51 4.70 - 6.10 M/uL    Hemoglobin 16.7 14.0 - 18.0 g/dL    Hematocrit 48.7 42.0 - 52.0 %    MCV 88.4 81.4 - 97.8 fL    MCH 30.3 27.0 - 33.0 pg    MCHC 34.3 33.7 - 35.3 g/dL    RDW 40.8 35.9 - 50.0 fL    Platelet Count 304 164 - 446 K/uL    MPV 11.2 9.0 - 12.9 fL    Neutrophils-Polys 56.20 44.00 - 72.00 %    Lymphocytes 32.50 22.00 - 41.00 %    Monocytes 9.40 0.00 - 13.40 %    Eosinophils 1.20 0.00 - 6.90 %    Basophils 0.40 0.00 - 1.80 %    Immature Granulocytes 0.30 0.00 - 0.90 %    Nucleated RBC 0.00 /100 WBC    Neutrophils (Absolute) 5.40 1.82 - 7.42 K/uL    Lymphs (Absolute) 3.12 1.00 - 4.80 K/uL    Monos (Absolute) 0.90 (H) 0.00 - 0.85 K/uL    Eos (Absolute) 0.12 0.00 - 0.51 K/uL    Baso (Absolute) 0.04 0.00 - 0.12 K/uL    Immature Granulocytes (abs) 0.03 0.00 - 0.11 K/uL    NRBC (Absolute) " 0.00 K/uL   COMP METABOLIC PANEL   Result Value Ref Range    Sodium 140 135 - 145 mmol/L    Potassium 3.7 3.6 - 5.5 mmol/L    Chloride 106 96 - 112 mmol/L    Co2 21 20 - 33 mmol/L    Anion Gap 13.0 7.0 - 16.0    Glucose 223 (H) 65 - 99 mg/dL    Bun 17 8 - 22 mg/dL    Creatinine 0.80 0.50 - 1.40 mg/dL    Calcium 9.1 8.5 - 10.5 mg/dL    AST(SGOT) 33 12 - 45 U/L    ALT(SGPT) 43 2 - 50 U/L    Alkaline Phosphatase 76 30 - 99 U/L    Total Bilirubin 0.5 0.1 - 1.5 mg/dL    Albumin 4.0 3.2 - 4.9 g/dL    Total Protein 7.3 6.0 - 8.2 g/dL    Globulin 3.3 1.9 - 3.5 g/dL    A-G Ratio 1.2 g/dL   ESTIMATED GFR   Result Value Ref Range    GFR (CKD-EPI) 103 >60 mL/min/1.73 m 2   Blood Culture,Hold   Result Value Ref Range    Blood Culture Hold Collected    POCT glucose device results   Result Value Ref Range    POC Glucose, Blood 269 (H) 65 - 99 mg/dL   POCT glucose device results   Result Value Ref Range    POC Glucose, Blood 224 (H) 65 - 99 mg/dL   POCT glucose device results   Result Value Ref Range    POC Glucose, Blood 158 (H) 65 - 99 mg/dL   POCT glucose device results   Result Value Ref Range    POC Glucose, Blood 163 (H) 65 - 99 mg/dL       Radiology  No orders to display       Problem List  1.  CVA    2 right-sided weakness      The note accurately reflects work and decisions made by me.  Rasheed Mcclure D.O.  6/5/2022  1:17 PM

## 2022-06-05 NOTE — ED NOTES
Assumed pt care, pt sleeping at this time. A bag of clothing was delivered for pt with key in it. Placed in pt room.

## 2022-06-05 NOTE — ED NOTES
Patient is resting comfortably. resp even and unlabored, pt remains in direct view of nurses station.   MD updated on patient status. Pt continues to deny SI

## 2022-06-05 NOTE — ED NOTES
Pt resting on gurney, awaiting lunch tray. No distress noted. On monitor, call light within reach.

## 2022-06-05 NOTE — ED NOTES
BGL assessed, 152, pt medicated per orders. Pt positioned in high fowlers and provided meal tray. No further needs at this time. On monitor, call light within reach

## 2022-06-05 NOTE — ED NOTES
Pt resting, no distress noted. Awaiting lunch tray. No other current needs. On monitor, call light within reach.

## 2022-06-05 NOTE — ED NOTES
"Pt estranged wife, Kaela Madrigal, called and states pt \"called me and told me he doesn't want to live anymore and just wants to kill himself\" Dr. Manzanares and Dr. Adhikari notified.     Pt curtained opened/ in direct view of RN and nurses station at this time. Pt provided urinal per request with tech present, using urinal at bedside.  "

## 2022-06-06 PROBLEM — I69.398 ABNORMALITY OF GAIT FOLLOWING CEREBROVASCULAR ACCIDENT (CVA): Status: ACTIVE | Noted: 2022-06-06

## 2022-06-06 PROBLEM — Z79.4 TYPE 2 DIABETES MELLITUS WITH HYPERGLYCEMIA, WITH LONG-TERM CURRENT USE OF INSULIN (HCC): Status: ACTIVE | Noted: 2022-06-06

## 2022-06-06 PROBLEM — R26.9 ABNORMALITY OF GAIT FOLLOWING CEREBROVASCULAR ACCIDENT (CVA): Status: ACTIVE | Noted: 2022-06-06

## 2022-06-06 PROBLEM — E11.65 TYPE 2 DIABETES MELLITUS WITH HYPERGLYCEMIA, WITH LONG-TERM CURRENT USE OF INSULIN (HCC): Status: ACTIVE | Noted: 2022-06-06

## 2022-06-06 LAB
GLUCOSE BLD STRIP.AUTO-MCNC: 130 MG/DL (ref 65–99)
GLUCOSE BLD STRIP.AUTO-MCNC: 206 MG/DL (ref 65–99)
GLUCOSE BLD STRIP.AUTO-MCNC: 215 MG/DL (ref 65–99)

## 2022-06-06 PROCEDURE — 700102 HCHG RX REV CODE 250 W/ 637 OVERRIDE(OP): Performed by: EMERGENCY MEDICINE

## 2022-06-06 PROCEDURE — 96372 THER/PROPH/DIAG INJ SC/IM: CPT

## 2022-06-06 PROCEDURE — 700102 HCHG RX REV CODE 250 W/ 637 OVERRIDE(OP): Performed by: HOSPITALIST

## 2022-06-06 PROCEDURE — A9270 NON-COVERED ITEM OR SERVICE: HCPCS | Performed by: EMERGENCY MEDICINE

## 2022-06-06 PROCEDURE — A9270 NON-COVERED ITEM OR SERVICE: HCPCS | Performed by: HOSPITALIST

## 2022-06-06 PROCEDURE — 700111 HCHG RX REV CODE 636 W/ 250 OVERRIDE (IP): Performed by: HOSPITALIST

## 2022-06-06 PROCEDURE — G0378 HOSPITAL OBSERVATION PER HR: HCPCS

## 2022-06-06 PROCEDURE — 82962 GLUCOSE BLOOD TEST: CPT

## 2022-06-06 PROCEDURE — 99219 PR INITIAL OBSERVATION CARE,LEVL II: CPT | Performed by: HOSPITALIST

## 2022-06-06 PROCEDURE — 99245 OFF/OP CONSLTJ NEW/EST HI 55: CPT | Performed by: PHYSICAL MEDICINE & REHABILITATION

## 2022-06-06 RX ORDER — ASPIRIN 81 MG/1
324 TABLET, CHEWABLE ORAL DAILY
Status: DISCONTINUED | OUTPATIENT
Start: 2022-06-06 | End: 2022-06-06

## 2022-06-06 RX ORDER — BISACODYL 10 MG
10 SUPPOSITORY, RECTAL RECTAL
Status: DISCONTINUED | OUTPATIENT
Start: 2022-06-06 | End: 2022-06-20 | Stop reason: HOSPADM

## 2022-06-06 RX ORDER — POLYETHYLENE GLYCOL 3350 17 G/17G
1 POWDER, FOR SOLUTION ORAL
Status: DISCONTINUED | OUTPATIENT
Start: 2022-06-06 | End: 2022-06-20 | Stop reason: HOSPADM

## 2022-06-06 RX ORDER — ACETAMINOPHEN 325 MG/1
650 TABLET ORAL EVERY 6 HOURS PRN
Status: DISCONTINUED | OUTPATIENT
Start: 2022-06-06 | End: 2022-06-20 | Stop reason: HOSPADM

## 2022-06-06 RX ORDER — ASPIRIN 300 MG/1
300 SUPPOSITORY RECTAL DAILY
Status: DISCONTINUED | OUTPATIENT
Start: 2022-06-06 | End: 2022-06-06

## 2022-06-06 RX ORDER — AMOXICILLIN 250 MG
2 CAPSULE ORAL 2 TIMES DAILY
Status: DISCONTINUED | OUTPATIENT
Start: 2022-06-06 | End: 2022-06-20 | Stop reason: HOSPADM

## 2022-06-06 RX ORDER — CARVEDILOL 25 MG/1
25 TABLET ORAL 2 TIMES DAILY WITH MEALS
Status: DISCONTINUED | OUTPATIENT
Start: 2022-06-06 | End: 2022-06-20 | Stop reason: HOSPADM

## 2022-06-06 RX ORDER — ASPIRIN 325 MG
325 TABLET ORAL DAILY
Status: DISCONTINUED | OUTPATIENT
Start: 2022-06-06 | End: 2022-06-06

## 2022-06-06 RX ORDER — HYDRALAZINE HYDROCHLORIDE 20 MG/ML
20 INJECTION INTRAMUSCULAR; INTRAVENOUS EVERY 6 HOURS PRN
Status: DISCONTINUED | OUTPATIENT
Start: 2022-06-06 | End: 2022-06-20 | Stop reason: HOSPADM

## 2022-06-06 RX ORDER — ENOXAPARIN SODIUM 100 MG/ML
40 INJECTION SUBCUTANEOUS DAILY
Status: DISCONTINUED | OUTPATIENT
Start: 2022-06-06 | End: 2022-06-20 | Stop reason: HOSPADM

## 2022-06-06 RX ADMIN — INSULIN HUMAN 6 UNITS: 100 INJECTION, SOLUTION PARENTERAL at 10:21

## 2022-06-06 RX ADMIN — AMLODIPINE BESYLATE 10 MG: 5 TABLET ORAL at 07:00

## 2022-06-06 RX ADMIN — CARVEDILOL 25 MG: 25 TABLET, FILM COATED ORAL at 17:46

## 2022-06-06 RX ADMIN — DOCUSATE SODIUM 100 MG: 50 LIQUID ORAL at 07:01

## 2022-06-06 RX ADMIN — LOSARTAN POTASSIUM 100 MG: 50 TABLET, FILM COATED ORAL at 07:01

## 2022-06-06 RX ADMIN — ENOXAPARIN SODIUM 40 MG: 40 INJECTION SUBCUTANEOUS at 17:46

## 2022-06-06 RX ADMIN — INSULIN GLARGINE-YFGN 15 UNITS: 100 INJECTION, SOLUTION SUBCUTANEOUS at 10:28

## 2022-06-06 RX ADMIN — INSULIN HUMAN 6 UNITS: 100 INJECTION, SOLUTION PARENTERAL at 21:22

## 2022-06-06 RX ADMIN — CARVEDILOL 12.5 MG: 12.5 TABLET, FILM COATED ORAL at 07:01

## 2022-06-06 RX ADMIN — ASPIRIN 81 MG: 81 TABLET, COATED ORAL at 07:00

## 2022-06-06 RX ADMIN — CLOPIDOGREL BISULFATE 75 MG: 75 TABLET ORAL at 07:00

## 2022-06-06 RX ADMIN — DOCUSATE SODIUM 50 MG AND SENNOSIDES 8.6 MG 2 TABLET: 8.6; 5 TABLET, FILM COATED ORAL at 17:46

## 2022-06-06 RX ADMIN — ATORVASTATIN CALCIUM 80 MG: 40 TABLET, FILM COATED ORAL at 17:46

## 2022-06-06 ASSESSMENT — ENCOUNTER SYMPTOMS
SPEECH CHANGE: 1
CHILLS: 0
WEAKNESS: 1
FEVER: 0
FOCAL WEAKNESS: 1

## 2022-06-06 NOTE — PROGRESS NOTES
"ED Provider Progress Note    ED Observation Progress Note    Date of Service: 06/06/22    Interval History  Patient has not had any complaints.  Vital signs stable.  He was seen by physiatry today.  Still working on placement.  We will continue with plan.    Physical Exam  /83   Pulse 74   Temp 36.1 °C (97 °F) (Temporal)   Resp 16   Ht 1.803 m (5' 11\")   Wt 93 kg (205 lb)   SpO2 96%   BMI 28.59 kg/m² .    Constitutional: Awake and alert. Nontoxic  HENT:  Grossly normal  Eyes: Grossly normal  Neck: Normal range of motion  Cardiovascular: Normal heart rate   Thorax & Lungs: No respiratory distress    Problem List  1.  Cerebrovascular accident with right-sided weakness      Electronically signed by: Basilio Sanchez M.D., 6/6/2022 12:37 PM  "

## 2022-06-06 NOTE — ED NOTES
Assumed care, bedside  report received from Mariya RN, POC discussed.   Introduced self as RN, Pt sleeping, wakes easily to voice, then falls back to sleep, call light within reach, no s/s of distress noted.

## 2022-06-06 NOTE — CONSULTS
Physical Medicine and Rehabilitation Consultation          Date of initial consultation: 6/6/2022  Consulting provider: Jose Manzanares DO  Reason for consultation: assess for acute inpatient rehab appropriateness  LOS: 0 Day(s)    Chief complaint: Nonfunctioning right hand    HPI: The patient is a 56 y.o. right hand dominant male with a past medical history of diabetes, hypertension, stroke 5 days prior to presentation;  who presented on 6/3/2022 10:12 PM after leaving AMA from [facility].  Patient was unhappy with his medical care, wanted to come to [facility] for rehabilitation.    Paper records received from [facility] reflect 2 areas of acute infarction in the left corona radiata, initial NIH score 6.  Patient was admitted, started on aspirin 325 mg daily, Plavix 75 mg daily for 3 weeks, and atorvastatin 80 mg for secondary stroke prevention.    The patient currently reports right hand weakness, right-sided incoordination, improving speech and swallow.  In regards to support, patient has no friends or family locally that he could stay with.  Patient's previously reported girlfriend in [place] is not an option for him at this time.  Patient has an ex-wife in [place] that is not an option.  Patient has several children in the [place] area, of which his daughter could be an option for discharge support but is not a robust option.  Patient currently lives in an apartment with lease expiring in 5 days and is no other place to live.    ROS  Pertinent positives are mentioned in the HPI, all others reviewed and are negative.    Social Hx:  1  -second floor apartment  [address]  With: Alone.    Employment:   Tobacco: Denies  Alcohol: 2 beers per day  Drugs: Denies    THERAPY:  Restrictions: Fall risk, swallow precautions  PT: Functional mobility   6/4: Walking 5 feet x 2 at mod assist    OT: ADLs  6/4 min assist grooming and lower body dressing    SLP:   None    IMAGING:  No  "advanced imaging from this admission available for my review    PROCEDURES:  None on this admission    PMH:  Past Medical History:   Diagnosis Date   • Diabetes (HCC)    • Hypertension    • Stroke (HCC)        PSH:  History reviewed. No pertinent surgical history.    FHX:  Non-pertinent to today's issues    Medications:  Current Facility-Administered Medications   Medication Dose   • amLODIPine (NORVASC) tablet 10 mg  10 mg   • atorvastatin (LIPITOR) tablet 80 mg  80 mg   • aspirin EC (ECOTRIN) tablet 81 mg  81 mg   • carvedilol (COREG) tablet 12.5 mg  12.5 mg   • clopidogrel (PLAVIX) tablet 75 mg  75 mg   • docusate sodium (Colace) oral solution 100 mg  100 mg   • insulin regular (HumuLIN R,NovoLIN R) injection  3-15 Units   • insulin GLARGINE (Lantus,Semglee) injection  15 Units   • losartan (COZAAR) tablet 100 mg  100 mg     No current outpatient medications on file.       Allergies:  Allergies   Allergen Reactions   • Penicillins Unspecified       Physical Exam:  Vitals: BP (!) 148/90   Pulse 75   Temp 36.1 °C (97 °F) (Temporal)   Resp 16   Ht 1.803 m (5' 11\")   Wt 93 kg (205 lb)   SpO2 95%   Gen: NAD  Head: NC/AT, slight right facial droop   Eyes/ Nose/ Mouth: moist mucous membranes  Cardio: RRR, good distal perfusion, warm extremities  Pulm: normal respiratory effort, no cyanosis   Abd: Soft NTND, negative borborygmi   Ext: No peripheral edema. No calf tenderness. No clubbing.    Mental status:  A&Ox4 (person, place, date, situation) answers questions appropriately follows commands  Speech: fluent, no aphasia or dysarthria    Motor:      Upper Extremity  Myotome R L   Shoulder flexion C5 4/5 5   Elbow flexion C5 4/5 5   Wrist extension C6 2/5 5   Elbow extension C7 4/5 5   Finger flexion C8 4/5 5   Finger abduction T1 0/5 5     Lower Extremity Myotome R L   Hip flexion L2 4/5 5   Knee extension L3 5 5   Ankle dorsiflexion L4 5 5   Toe extension L5 5 5   Ankle plantarflexion S1 5 5     Sensory:   intact " to light touch through out      DTRs:  Right  Left    Brachioradialis  2+  2+   Patella tendon  2+ 2+     No clonus at bilateral ankles  Negative babinski b/l  Positive Angel right    Tone: no spasticity noted, no cogwheeling noted    Coordination:   Altered finger mag right  Altered heel-to-shin right    Labs: Reviewed and significant for   Recent Labs     06/03/22  2316   RBC 5.51   HEMOGLOBIN 16.7   HEMATOCRIT 48.7   PLATELETCT 304     Recent Labs     06/03/22  2316   SODIUM 140   POTASSIUM 3.7   CHLORIDE 106   CO2 21   GLUCOSE 223*   BUN 17   CREATININE 0.80   CALCIUM 9.1     Recent Results (from the past 24 hour(s))   POCT glucose device results    Collection Time: 06/05/22  3:02 PM   Result Value Ref Range    POC Glucose, Blood 152 (H) 65 - 99 mg/dL   POCT glucose device results    Collection Time: 06/05/22  7:59 PM   Result Value Ref Range    POC Glucose, Blood 184 (H) 65 - 99 mg/dL   POCT glucose device results    Collection Time: 06/05/22 10:22 PM   Result Value Ref Range    POC Glucose, Blood 183 (H) 65 - 99 mg/dL   POCT glucose device results    Collection Time: 06/06/22 10:13 AM   Result Value Ref Range    POC Glucose, Blood 206 (H) 65 - 99 mg/dL         ASSESSMENT:  Patient is a 56 y.o. male admitted with 2 areas of acute infarction in the left corona radiata      [facility] Code / Diagnosis to Support: 0001.2 - Stroke: Right Body Involvement (Left Brain)    Rehabilitation: Impaired ADLs and mobility  Patient is not a candidate for IPR due to lack of discharge support and stable housing.    All cases are subject to administrative review and recommendations may change    Additional Recommendations:  -Recommend SNF placement.  Reviewed options with patient, this is also his choice.  Patient's plan is to move his belongings into a storage unit which he already has, then try to arrange a first-floor apartment from skilled nursing, and discharge when available.  Patient will also look into taking medical leave  from work.  -When we discussed being able to discharge to his daughter's place, it was determined this was a less viable option due to travel and living space available.  -ER  to arrange SNF placement  -Continue PT, OT and SLP while in-house  -Secondary stroke prevention with aspirin, statin, Plavix for 21 days, tight blood pressure control  -Order placed for follow-up with Dr. Silverman, outpatient physiatrist for continued stroke rehabilitation needs  -PMR will sign off, please reconsult or reach out via Voalte if further evaluation or medical management is requested      Thank you for allowing us to participate in the care of this patient.     Patient was seen for 83 minutes on unit/floor of which > 50% of time was spent on counseling and coordination of care regarding the above, including prognosis, risk reduction, benefits of treatment, and options for next stage of care.    Jm Delgadillo, DO   Physical Medicine and Rehabilitation     Please note that this dictation was created using voice recognition software. I have made every reasonable attempt to correct obvious errors, but there may be errors of grammar and possibly content that I did not discover before finalizing the note.

## 2022-06-06 NOTE — ED NOTES
Report received from RENAY Sow. Pt is A&Ox4 and awake in bed. Pt on cardiac monitor, pulse ox, and automatic BP. VSS, saturating above 95% on RA, SR in the 70s. Pt complains of 0/10 pain at this time. Call light within reach and pt updated on POC.

## 2022-06-06 NOTE — ASSESSMENT & PLAN NOTE
Lantus 15 units QAM  continue sliding scale insulin monitor CBGs  Serum BS's are decently controlled a tthis time

## 2022-06-06 NOTE — DISCHARGE PLANNING
Per physiatry patient is not a candidate for IPR d/t lack of discharge support and stable housing. Physiatry recommending SNF placement, updated cm. TCC will no longer follow, please call with any questions [phone].

## 2022-06-06 NOTE — H&P
Hospital Medicine History & Physical Note    Date of Service  6/6/2022    Primary Care Physician  No primary care provider on file.    Consultants      Code Status  Full Code    Chief Complaint  Chief Complaint   Patient presents with   • Other     Pt suffered a stroke on Monday 5/30 and was admitted to [facility] where he has remained in patient up until approximately an hour ago. Pt elected to leave AMA from their facility. Presents to us today with the hope that we can coordinate his transfer to a rehab facility.        History of Presenting Illness  Reji Madrigal Jr. is a 56 y.o. male who presented 6/3/2022 with right-sided weakness.  The patient has a history of hypertension diabetes and coronary artery disease was admitted to [facility] after presenting with right-sided weakness and was diagnosed with acute stroke he subsequently left AMA and presented to our emergency department and was emergency department with failed attempt to place him in rehab.  The patient reports that his right-sided weakness is improving.  He denies any headache.  His dysarthria is improved and is tolerating his diet.  No fever or chills.  The patient had a work-up for stroke at the outlying facility with noncontrast MRI revealing 2 small areas of acute infarct in the left corona radiata and previous right thalamic infarction.    I discussed the plan of care with patient.    Review of Systems  Review of Systems   Constitutional: Negative for chills and fever.   Neurological: Positive for speech change, focal weakness and weakness.   All other systems reviewed and are negative.      Past Medical History   has a past medical history of Diabetes (HCC), Hypertension, and Stroke (HCC).  CAD    Surgical History  Reviewed and not pertinent to the presenting problem    Family History  family history is not on file.   Family history reviewed with patient. There is family history that is pertinent to the chief  complaint.     Social History   reports that he has never smoked. He has never used smokeless tobacco. He reports current alcohol use. He reports that he does not use drugs.    Allergies  Allergies   Allergen Reactions   • Penicillins Unspecified       Medications  None       Physical Exam  Temp:  [36.1 °C (97 °F)] 36.1 °C (97 °F)  Pulse:  [58-84] 76  Resp:  [13-18] 14  BP: (113-197)/() 143/90  SpO2:  [91 %-97 %] 95 %  Blood Pressure: (!) 143/90   Temperature: 36.1 °C (97 °F)   Pulse: 76   Respiration: 14   Pulse Oximetry: 95 %       Physical Exam  Vitals and nursing note reviewed.   Constitutional:       Appearance: He is well-developed. He is not diaphoretic.   HENT:      Head: Normocephalic and atraumatic.      Mouth/Throat:      Pharynx: No oropharyngeal exudate.   Eyes:      General: No scleral icterus.        Right eye: No discharge.         Left eye: No discharge.      Conjunctiva/sclera: Conjunctivae normal.      Pupils: Pupils are equal, round, and reactive to light.   Neck:      Vascular: No JVD.      Trachea: No tracheal deviation.   Cardiovascular:      Rate and Rhythm: Normal rate and regular rhythm.      Heart sounds: No murmur heard.    No friction rub. No gallop.   Pulmonary:      Effort: Pulmonary effort is normal. No respiratory distress.      Breath sounds: Normal breath sounds. No stridor. No wheezing.   Chest:      Chest wall: No tenderness.   Abdominal:      General: Bowel sounds are normal. There is no distension.      Palpations: Abdomen is soft.      Tenderness: There is no abdominal tenderness. There is no rebound.   Musculoskeletal:         General: No tenderness.      Cervical back: Neck supple.   Skin:     General: Skin is warm and dry.      Nails: There is no clubbing.   Neurological:      Mental Status: He is alert and oriented to person, place, and time.      Cranial Nerves: Cranial nerve deficit present.      Motor: Weakness present. No abnormal muscle tone.      Comments:  Right hemiparesis 4/5 distal worse than proximal  Mild dysarthria   Psychiatric:         Behavior: Behavior normal.         Laboratory:  Recent Labs     06/03/22  2316   WBC 9.6   RBC 5.51   HEMOGLOBIN 16.7   HEMATOCRIT 48.7   MCV 88.4   MCH 30.3   MCHC 34.3   RDW 40.8   PLATELETCT 304   MPV 11.2     Recent Labs     06/03/22  2316   SODIUM 140   POTASSIUM 3.7   CHLORIDE 106   CO2 21   GLUCOSE 223*   BUN 17   CREATININE 0.80   CALCIUM 9.1     Recent Labs     06/03/22  2316   ALTSGPT 43   ASTSGOT 33   ALKPHOSPHAT 76   TBILIRUBIN 0.5   GLUCOSE 223*         No results for input(s): NTPROBNP in the last 72 hours.      No results for input(s): TROPONINT in the last 72 hours.    Imaging:  No orders to display            Assessment/Plan:  Justification for Admission Status  I anticipate this patient is appropriate for observation status at this time because Rehab referral and placement    * CVA (cerebral vascular accident) ([facility])  Assessment & Plan  Continue aspirin Plavix  Continue atorvastatin  Blood pressure control  PT OT SLP  Case management to assist with discharge planning    Type 2 diabetes mellitus with hyperglycemia, with long-term current use of insulin ([facility])  Assessment & Plan  Continue with increase Lantus 18 units continue sliding scale insulin monitor CBGs  Check hemoglobin A1c    Primary hypertension  Assessment & Plan  Continue amlodipineand losartan  Increase carvedilol 25 bid   Goal SBP is normotension  Monitor blood pressure and adjust accordingly    Hemiparesis affecting right side as late effect of stroke ([facility])  Assessment & Plan  Continue aspirin and statin  Blood pressure control  PT OT SLP  Discharge planning      VTE prophylaxis: enoxaparin ppx

## 2022-06-06 NOTE — PROGRESS NOTES
Spiritual Care Note    Patient Information     Patient's Name: Reji Madrigal Jr.   MRN: [id number]    YOB: 1965   Age and Gender: 56 y.o. male   Service Area: ED C   Room (and Bed):  24/24 [facility]   Ethnicity or Nationality:     Primary Language: English   Bahai/Spiritual preference: Episcopalian   Place of Residence: [place]   Family/Friends/Others Present: No   Clinical Team Present: No   Medical Diagnosis(-es)/Procedure(s):    Code Status: No Order    Date of Admission: 6/3/2022   Length of Stay: 0 days        Spiritual Care Provider Information:  Name of Spiritual Care Provider: Stephanie Stewart  Title of Spiritual Care Provider: Volunteer   Phone Number: [phone]  E-mail: [email or web address]  Total time : 10 minutes    Spiritual Screen Results:    Gen Nursing        Palliative Care         Encounter/Request Information  Encounter/Request Type   Visited With: Patient  Nature of the Visit: Initial, On shift  General Visit: Yes  Referral From/ Origin of Request: [place] rounds, Verbal patient    Religous Needs/Values  Bahai Needs Visit  Bahai Needs: Prayer    Spiritual Assessment   Spiritual Care Encounters  Observations/Symptoms: Accepting, Thankfulness  Interacton/Conversation: Pt requested prayer for healing and strength, and thanked the .  Assessment: Need  Need: Seeking Spiritual Assistance and Support  Interventions: Compassionate presence, prayer.  Outcomes: Spiritual Comfort  Plan: Visit Upon Request    Notes:

## 2022-06-06 NOTE — DISCHARGE PLANNING
CM met with pt to discuss determination of physiatrist. He has been declined due to lack of discharge support and stable housing.    Pt states his son has secured his housing until the end of the month and that he could arrange for someone to stay with him upon discharge.      Voalte message sent to Ara Soriano to see if pt could be reconsidered given new information.    Pt is reluctant to sign choice for [facility] or SNF until this new information can be reviewed by [facility].

## 2022-06-06 NOTE — ED NOTES
Rounded on patient, NAD noted, on monitors, pt resting in bed with eyes closed and bilateral chest rise

## 2022-06-06 NOTE — DISCHARGE PLANNING
[facility] Transitional Care Coordination    Referral from: Dr Manzanares    Insurance Provider on Facesheet: [employer]  Potential Rehab Diagnosis: Stroke    Chart review indicates patient may need on going medical management and may have therapy needs to possibly meet inpatient rehab facility criteria with the goal of returning to community.    D/C support: Per chart review Girlfriend in [place] will be able to stay with patient temporarily.  Will need to verify.     Physiatry consultation forwarded per protocol.     CVA - patient left [facility] AMA, wants go come to [facility]. Patient lives alone, has support from girlfriend out of state. Physiatry to consult, TCC will follow.     Thank you for the referral.

## 2022-06-07 LAB
ANION GAP SERPL CALC-SCNC: 11 MMOL/L (ref 7–16)
BUN SERPL-MCNC: 20 MG/DL (ref 8–22)
CALCIUM SERPL-MCNC: 9.3 MG/DL (ref 8.5–10.5)
CHLORIDE SERPL-SCNC: 106 MMOL/L (ref 96–112)
CHOLEST SERPL-MCNC: 178 MG/DL (ref 100–199)
CO2 SERPL-SCNC: 24 MMOL/L (ref 20–33)
CREAT SERPL-MCNC: 1.23 MG/DL (ref 0.5–1.4)
GFR SERPLBLD CREATININE-BSD FMLA CKD-EPI: 68 ML/MIN/1.73 M 2
GLUCOSE BLD STRIP.AUTO-MCNC: 166 MG/DL (ref 65–99)
GLUCOSE BLD STRIP.AUTO-MCNC: 192 MG/DL (ref 65–99)
GLUCOSE BLD STRIP.AUTO-MCNC: 223 MG/DL (ref 65–99)
GLUCOSE BLD STRIP.AUTO-MCNC: 229 MG/DL (ref 65–99)
GLUCOSE SERPL-MCNC: 196 MG/DL (ref 65–99)
HDLC SERPL-MCNC: 32 MG/DL
LDLC SERPL CALC-MCNC: 108 MG/DL
POTASSIUM SERPL-SCNC: 3.5 MMOL/L (ref 3.6–5.5)
SODIUM SERPL-SCNC: 141 MMOL/L (ref 135–145)
TRIGL SERPL-MCNC: 191 MG/DL (ref 0–149)

## 2022-06-07 PROCEDURE — 36415 COLL VENOUS BLD VENIPUNCTURE: CPT

## 2022-06-07 PROCEDURE — 700102 HCHG RX REV CODE 250 W/ 637 OVERRIDE(OP): Performed by: EMERGENCY MEDICINE

## 2022-06-07 PROCEDURE — 80048 BASIC METABOLIC PNL TOTAL CA: CPT

## 2022-06-07 PROCEDURE — 92610 EVALUATE SWALLOWING FUNCTION: CPT

## 2022-06-07 PROCEDURE — 96372 THER/PROPH/DIAG INJ SC/IM: CPT

## 2022-06-07 PROCEDURE — 80061 LIPID PANEL: CPT

## 2022-06-07 PROCEDURE — 700102 HCHG RX REV CODE 250 W/ 637 OVERRIDE(OP): Performed by: HOSPITALIST

## 2022-06-07 PROCEDURE — G0378 HOSPITAL OBSERVATION PER HR: HCPCS

## 2022-06-07 PROCEDURE — A9270 NON-COVERED ITEM OR SERVICE: HCPCS | Performed by: EMERGENCY MEDICINE

## 2022-06-07 PROCEDURE — 700111 HCHG RX REV CODE 636 W/ 250 OVERRIDE (IP): Performed by: HOSPITALIST

## 2022-06-07 PROCEDURE — 99224 PR SUBSEQUENT OBSERVATION CARE,LEVEL I: CPT | Performed by: STUDENT IN AN ORGANIZED HEALTH CARE EDUCATION/TRAINING PROGRAM

## 2022-06-07 PROCEDURE — A9270 NON-COVERED ITEM OR SERVICE: HCPCS | Performed by: HOSPITALIST

## 2022-06-07 PROCEDURE — 82962 GLUCOSE BLOOD TEST: CPT | Mod: 91

## 2022-06-07 RX ORDER — DOCUSATE SODIUM 100 MG/1
100 CAPSULE, LIQUID FILLED ORAL 2 TIMES DAILY
Status: DISCONTINUED | OUTPATIENT
Start: 2022-06-07 | End: 2022-06-20 | Stop reason: HOSPADM

## 2022-06-07 RX ADMIN — LOSARTAN POTASSIUM 100 MG: 50 TABLET, FILM COATED ORAL at 04:50

## 2022-06-07 RX ADMIN — DOCUSATE SODIUM 100 MG: 50 LIQUID ORAL at 04:50

## 2022-06-07 RX ADMIN — DOCUSATE SODIUM 50 MG AND SENNOSIDES 8.6 MG 2 TABLET: 8.6; 5 TABLET, FILM COATED ORAL at 04:50

## 2022-06-07 RX ADMIN — ACETAMINOPHEN 650 MG: 325 TABLET ORAL at 23:41

## 2022-06-07 RX ADMIN — CLOPIDOGREL BISULFATE 75 MG: 75 TABLET ORAL at 04:50

## 2022-06-07 RX ADMIN — CARVEDILOL 25 MG: 25 TABLET, FILM COATED ORAL at 18:08

## 2022-06-07 RX ADMIN — ATORVASTATIN CALCIUM 80 MG: 40 TABLET, FILM COATED ORAL at 18:08

## 2022-06-07 RX ADMIN — INSULIN GLARGINE-YFGN 15 UNITS: 100 INJECTION, SOLUTION SUBCUTANEOUS at 08:52

## 2022-06-07 RX ADMIN — INSULIN HUMAN 3 UNITS: 100 INJECTION, SOLUTION PARENTERAL at 18:08

## 2022-06-07 RX ADMIN — INSULIN HUMAN 6 UNITS: 100 INJECTION, SOLUTION PARENTERAL at 13:03

## 2022-06-07 RX ADMIN — ENOXAPARIN SODIUM 40 MG: 40 INJECTION SUBCUTANEOUS at 18:08

## 2022-06-07 RX ADMIN — ASPIRIN 81 MG: 81 TABLET, COATED ORAL at 04:50

## 2022-06-07 RX ADMIN — CARVEDILOL 25 MG: 25 TABLET, FILM COATED ORAL at 08:53

## 2022-06-07 RX ADMIN — AMLODIPINE BESYLATE 10 MG: 5 TABLET ORAL at 04:50

## 2022-06-07 RX ADMIN — INSULIN HUMAN 3 UNITS: 100 INJECTION, SOLUTION PARENTERAL at 08:52

## 2022-06-07 RX ADMIN — INSULIN HUMAN 6 UNITS: 100 INJECTION, SOLUTION PARENTERAL at 20:20

## 2022-06-07 ASSESSMENT — ENCOUNTER SYMPTOMS
HEADACHES: 0
SPEECH CHANGE: 0
INSOMNIA: 0
SENSORY CHANGE: 0
BACK PAIN: 0
NAUSEA: 0
FOCAL WEAKNESS: 1
PALPITATIONS: 0
FEVER: 0
SHORTNESS OF BREATH: 0
ABDOMINAL PAIN: 0
BLURRED VISION: 0
DIZZINESS: 0
CHILLS: 0
EYE PAIN: 0
VOMITING: 0
COUGH: 0

## 2022-06-07 ASSESSMENT — LIFESTYLE VARIABLES: SUBSTANCE_ABUSE: 0

## 2022-06-07 NOTE — CARE PLAN
The patient is Stable - Low risk of patient condition declining or worsening    Shift Goals  Clinical Goals: safety  Patient Goals: rehab placement    Progress made toward(s) clinical / shift goals:  Pt remains free of falls with fall precautions in place. Case management working with pt regarding rehab options.    Patient is not progressing towards the following goals:

## 2022-06-07 NOTE — ED NOTES
Phone report to RENAY Cassidy. All questions answered. Pt transported off unit with all belongings. Pt awake and breathing with even, unlabored respirations at time of transfer.

## 2022-06-07 NOTE — PROGRESS NOTES
Alert and able to let his needs known, bed alarm placed. Right sided weakness due to recent stroke, neuro checks ordered and order dc'd for NIHSS scale per ANP.    Anxious and frustrated when arrived to the unit and vented to writer about the following....  Recent , girlfriend left him and she moved away, moved from [place] for a job in [place], gas prices are rising, no family and friends in [place], he may loose his apartment within the month, he hasn't been placed in a rehab facility, he's been sitting in the ER for 3 days prior to coming to the floor because unsuccessful placement to a facility, he hasn't showered in 4 days, his job as an  is difficult now that he can't draw with his right hand, left [facility] because of lack of treatment and unable to place him into a rehab facility, he can't walk and use his arm anymore, he didn't have dinner tonight and he was sitting in urine before help arrived to the unit    Provided a listening ear and explained the plan of care once he is admitted to the unit. Notified him that social work and PT, OT and the MD will be working with him for placement.  Eventually apologized for his frustations

## 2022-06-07 NOTE — DISCHARGE PLANNING
Received Choice form at 1229  Agency/Facility Name: [facility]  Referral sent per Choice form @ 8334     @[phone]  Agency/Facility Name: [facility]  Spoke To: Yvan  Outcome: Referral pending.  Need to know the pt's discharge plan.  Anastasia will do an onsite eval on Thursday.  Pt was at [facility] and left AMA.  Pt possibly losing apartment at the end of the month.

## 2022-06-07 NOTE — THERAPY
"Speech Language Pathology   Clinical Swallow Evaluation     Patient Name: Reji Madrigal Jr.  AGE:  56 y.o., SEX:  male  Medical Record #: [id number]  Today's Date: 6/7/2022     Precautions: Fall Risk    Patient is 56 y.o. male admitted 6/3/22 for right sided weakness. PMHx:  hypertension diabetes and coronary artery disease. The patient had a work-up for stroke at the outlying facility with noncontrast MRI revealing 2 small areas of acute infarct in the left corona radiata and previous right thalamic infarction.      Level of Consciousness: Alert, Awake  Affect/Behavior: Cooperative  Follows Directives: Yes  Orientation: Oriented x 4  Hearing: Functional hearing  Vision: Functional vision    Subjective: Patient sleeping and roused given moderate verbal cues. Patient reported speech is 95% compared to baseline but stated, \"I also just woke up.\" Pt stated dysarthria has completely resolved otherwise.     Prior Living Situation & Level of Function: Patient was eating a regular diet without difficulty at baseline. Pt reported some trouble swallowing for 2 days right after CVA but also resolved.     Oral Mechanism Evaluation  Facial Symmetry: Central R facial droop  Facial Sensation: Equal  Labial Observations: R sided weakness  Lingual Observations: Midline  Dentition: Fair  Comments:    Voice  Quality: WFL  Resonance: WFL  Intensity: Appropriate  Cough: WFL  Comments:    Current Method of Nutrition   Oral diet (Regular diet )       Assessment  Positioning: Mao's (60-90 degrees)  Bolus Administration: Patient  Oxygen Requirements: Room Air  Factor(s) Affecting Performance: None    Swallowing Trials  Thin Liquid (TN0): WFL  Pureed (PU4): Not tested, declined  Regular (RG7): WFL    Comments: Adequate bolus acceptance and containment. Onset of swallow trigger was timely. NO overt s/sx of aspiration appreciated with all trials tested including consecutive sips of thins via straw. Voice remained clear. Mastication " functional for regular solids. Education provided to pt regarding current status and SLP.     Clinical Impressions  No clinical signs of oropharyngeal dysphagia. A modified diet is not indicated at this time. No further acute SLP services indicated.      Recommendations  1. Continue Regular and Thin Liquid diet   2.  Instrumentation: None indicated at this time  3.  Swallowing Instructions & Precautions:   Supervision: Independent  Positioning: Fully upright and midline during oral intake  Medication: Whole with liquid  Strategies: None  Oral Care: BID    Plan  Recommend Speech Therapy for Evaluation only for the following treatments:  Dysphagia Training and Patient / Family / Caregiver Education.    Discharge Recommendations: Anticipate that the patient will have no further speech therapy needs after discharge from the hospital       06/07/22 0808   Vitals   O2 Delivery Device None - Room Air   Prior Level Of Function   Communication Within Functional Limits   Swallow Within Functional Limits   Dentition Intact   Dentures None   Hearing Within Functional Limits for Evaluation   Occupation (Pre-Hospital Vocational) Employed Full Time  ()

## 2022-06-07 NOTE — CARE PLAN
The patient is Watcher - Medium risk of patient condition declining or worsening    Shift Goals  Clinical Goals: orient to room, maintain safety with interventions in place    Progress made toward(s) clinical / shift goals:  fall interventions in place    Patient is not progressing towards the following goals:

## 2022-06-07 NOTE — DISCHARGE PLANNING
MSW spoke with both Pt and his son Franky [phone], both state that the Pt only has a guaranteed place until the end of June. Pt did say that there was a possibility he could go to an extended stay hotel following discharge if necessary. He added it was difficult for him to find a place while hospitalized. MSW relayed this message to Ara Soriano via Voalte, who replied that Pt is denied, and he would need to secure permanent housing for 2-3 weeks after discharged in order to be reconsidered.

## 2022-06-07 NOTE — DISCHARGE PLANNING
0918: Per cm patient states his son has secured his housing until the end of the month and that he could arrange for someone to stay with him upon discharge. Notified cm we will need to speak to this caregiver and verify that they will provide care at discharge for patient to be a candidate for [facility].    1043: Discussed case with physiatry. Would also need to confirm patient has housing secured until August to be a candidate for [facility], patient needs to have housing secured for after a stay at [facility]. Provided update to cm.

## 2022-06-07 NOTE — PROGRESS NOTES
Hospital Medicine Daily Progress Note    Date of Service  6/7/2022    Chief Complaint  Reji Madrigal Jr. is a 56 y.o. male admitted 6/3/2022 with stroke.    Hospital Course  Reji Madrigal Jr. is a 56 y.o. male who presented 6/3/2022 with right-sided weakness.  The patient has a history of hypertension diabetes and coronary artery disease was admitted to [facility] after presenting with right-sided weakness and was diagnosed with acute stroke he subsequently left AMA and presented to our emergency department and was emergency department with failed attempt to place him in rehab.  The patient reports that his right-sided weakness is improving.  He denies any headache.  His dysarthria is improved and is tolerating his diet.  No fever or chills.  The patient had a work-up for stroke at the outlying facility with noncontrast MRI revealing 2 small areas of acute infarct in the left corona radiata and previous right thalamic infarction.    Interval Problem Update  Admitted yesterday for placement after recent stroke.  Patient reports R sided weakness has been improving since onset last week. He is eating well.  Patient is medically cleared, pending rehab/SNF placement.    I have personally seen and examined the patient at bedside. I discussed the plan of care with patient.    Consultants/Specialty  none    Code Status  Full Code    Disposition  Patient is medically cleared for discharge.   Anticipate discharge to to skilled nursing facility.  I have placed the appropriate orders for post-discharge needs.    Review of Systems  Review of Systems   Constitutional: Negative for chills and fever.   Eyes: Negative for blurred vision and pain.   Respiratory: Negative for cough and shortness of breath.    Cardiovascular: Negative for chest pain, palpitations and leg swelling.   Gastrointestinal: Negative for abdominal pain, nausea and vomiting.   Genitourinary: Negative for dysuria and urgency.    Musculoskeletal: Negative for back pain.   Skin: Negative for itching and rash.   Neurological: Positive for focal weakness. Negative for dizziness, sensory change, speech change and headaches.   Psychiatric/Behavioral: Negative for substance abuse. The patient does not have insomnia.         Physical Exam  Temp:  [36.1 °C (97 °F)-36.6 °C (97.8 °F)] 36.2 °C (97.2 °F)  Pulse:  [66-83] 66  Resp:  [14-18] 17  BP: (127-195)/() 128/90  SpO2:  [92 %-97 %] 95 %    Physical Exam  Constitutional:       General: He is not in acute distress.     Appearance: He is not ill-appearing.   HENT:      Head: Normocephalic and atraumatic.      Right Ear: External ear normal.      Left Ear: External ear normal.      Mouth/Throat:      Pharynx: No oropharyngeal exudate or posterior oropharyngeal erythema.   Eyes:      Extraocular Movements: Extraocular movements intact.      Pupils: Pupils are equal, round, and reactive to light.   Cardiovascular:      Rate and Rhythm: Normal rate and regular rhythm.      Pulses: Normal pulses.      Heart sounds: Normal heart sounds.   Pulmonary:      Effort: Pulmonary effort is normal. No respiratory distress.      Breath sounds: Normal breath sounds. No wheezing or rales.   Abdominal:      General: Bowel sounds are normal. There is no distension.      Palpations: Abdomen is soft.      Tenderness: There is no abdominal tenderness. There is no guarding or rebound.   Musculoskeletal:         General: No swelling or tenderness.      Cervical back: Normal range of motion and neck supple.      Right lower leg: No edema.      Left lower leg: No edema.   Skin:     General: Skin is warm and dry.   Neurological:      General: No focal deficit present.      Mental Status: He is oriented to person, place, and time.      Cranial Nerves: No cranial nerve deficit.      Sensory: No sensory deficit.      Motor: Weakness present.   Psychiatric:         Mood and Affect: Mood normal.         Behavior: Behavior  normal.         Fluids    Intake/Output Summary (Last 24 hours) at 6/7/2022 1301  Last data filed at 6/7/2022 0500  Gross per 24 hour   Intake 480 ml   Output 300 ml   Net 180 ml       Laboratory      Recent Labs     06/07/22  0231   SODIUM 141   POTASSIUM 3.5*   CHLORIDE 106   CO2 24   GLUCOSE 196*   BUN 20   CREATININE 1.23   CALCIUM 9.3             Recent Labs     06/07/22  0231   TRIGLYCERIDE 191*   HDL 32*   *       Imaging  No orders to display        Assessment/Plan  * CVA (cerebral vascular accident) (HCC)  Assessment & Plan  Continue aspirin Plavix  Continue atorvastatin  Blood pressure control  PT OT SLP  Case management to assist with discharge planning    Type 2 diabetes mellitus with hyperglycemia, with long-term current use of insulin ([facility])  Assessment & Plan  Continue with increase Lantus 18 units continue sliding scale insulin monitor CBGs  Check hemoglobin A1c    Primary hypertension  Assessment & Plan  Continue amlodipineand losartan  Increase carvedilol 25 bid   Goal SBP is normotension  Monitor blood pressure and adjust accordingly    Hemiparesis affecting right side as late effect of stroke ([facility])  Assessment & Plan  Continue aspirin and statin  Blood pressure control  PT OT SLP  Discharge planning       VTE prophylaxis: enoxaparin ppx    I have performed a physical exam and reviewed and updated ROS and Plan today (6/7/2022). In review of yesterday's note (6/6/2022), there are no changes except as documented above.

## 2022-06-07 NOTE — DISCHARGE PLANNING
Case Management Discharge Planning    Admission Date: 6/3/2022  GMLOS:    ALOS: 0    6-Clicks ADL Score: 15  6-Clicks Mobility Score: 14  PT and/or OT Eval ordered: Yes  Post-acute Referrals Ordered: Yes  Post-acute Choice Obtained: Yes  Has referral(s) been sent to post-acute provider:  Yes      Anticipated Discharge Dispo:  Skilled    DME Needed: No    Action(s) Taken: Updated Provider/Nurse on Discharge Plan    Escalations Completed: None    Medically Clear: No    Next Steps: continue to monitor for discharge barriers    Barriers to Discharge: Pending Placement    Is the patient up for discharge tomorrow: No    Patient is medically clear to discharge, has been declined by [facility] due to not having a solid discharge plan/living arrangement.    SW spoke to patient about his discharge plan.  SW told patient that he was denied by [facility] due to not have a stable discharge plan. Patient wanted to know how he is to find housing when he is in the hospital.  That his son had helped him get his lease extended till the end of June. SW asked if patient has other family who could help him. Patient stated his children, can't. One son lives in [place] with two other roommate, his youngest son lives with his son to be ex wife and his daughter just moved in with her friend.  SW went over options patient had, skilled nursing or getting home health.  Patient stated that he will go to a skilled facility as he lives on the second floor at his apartment complex.      MARY KATE faxed Choice to ryland ALEJO     PASRR: [id number]

## 2022-06-08 LAB
GLUCOSE BLD STRIP.AUTO-MCNC: 148 MG/DL (ref 65–99)
GLUCOSE BLD STRIP.AUTO-MCNC: 180 MG/DL (ref 65–99)
GLUCOSE BLD STRIP.AUTO-MCNC: 192 MG/DL (ref 65–99)
GLUCOSE BLD STRIP.AUTO-MCNC: 208 MG/DL (ref 65–99)

## 2022-06-08 PROCEDURE — 700102 HCHG RX REV CODE 250 W/ 637 OVERRIDE(OP): Performed by: HOSPITALIST

## 2022-06-08 PROCEDURE — A9270 NON-COVERED ITEM OR SERVICE: HCPCS | Performed by: HOSPITALIST

## 2022-06-08 PROCEDURE — 97112 NEUROMUSCULAR REEDUCATION: CPT

## 2022-06-08 PROCEDURE — G0378 HOSPITAL OBSERVATION PER HR: HCPCS

## 2022-06-08 PROCEDURE — 700111 HCHG RX REV CODE 636 W/ 250 OVERRIDE (IP): Performed by: HOSPITALIST

## 2022-06-08 PROCEDURE — 82962 GLUCOSE BLOOD TEST: CPT | Mod: 91

## 2022-06-08 PROCEDURE — 99224 PR SUBSEQUENT OBSERVATION CARE,LEVEL I: CPT | Performed by: STUDENT IN AN ORGANIZED HEALTH CARE EDUCATION/TRAINING PROGRAM

## 2022-06-08 PROCEDURE — 96372 THER/PROPH/DIAG INJ SC/IM: CPT

## 2022-06-08 PROCEDURE — 97535 SELF CARE MNGMENT TRAINING: CPT

## 2022-06-08 RX ADMIN — AMLODIPINE BESYLATE 10 MG: 5 TABLET ORAL at 04:27

## 2022-06-08 RX ADMIN — DOCUSATE SODIUM 50 MG AND SENNOSIDES 8.6 MG 2 TABLET: 8.6; 5 TABLET, FILM COATED ORAL at 04:27

## 2022-06-08 RX ADMIN — ATORVASTATIN CALCIUM 80 MG: 40 TABLET, FILM COATED ORAL at 18:05

## 2022-06-08 RX ADMIN — INSULIN HUMAN 3 UNITS: 100 INJECTION, SOLUTION PARENTERAL at 21:15

## 2022-06-08 RX ADMIN — CARVEDILOL 25 MG: 25 TABLET, FILM COATED ORAL at 09:01

## 2022-06-08 RX ADMIN — CLOPIDOGREL BISULFATE 75 MG: 75 TABLET ORAL at 04:27

## 2022-06-08 RX ADMIN — ASPIRIN 81 MG: 81 TABLET, COATED ORAL at 04:27

## 2022-06-08 RX ADMIN — LOSARTAN POTASSIUM 100 MG: 50 TABLET, FILM COATED ORAL at 04:27

## 2022-06-08 RX ADMIN — INSULIN HUMAN 6 UNITS: 100 INJECTION, SOLUTION PARENTERAL at 13:21

## 2022-06-08 RX ADMIN — ENOXAPARIN SODIUM 40 MG: 40 INJECTION SUBCUTANEOUS at 18:06

## 2022-06-08 RX ADMIN — INSULIN GLARGINE-YFGN 15 UNITS: 100 INJECTION, SOLUTION SUBCUTANEOUS at 09:01

## 2022-06-08 RX ADMIN — INSULIN HUMAN 3 UNITS: 100 INJECTION, SOLUTION PARENTERAL at 09:02

## 2022-06-08 RX ADMIN — CARVEDILOL 25 MG: 25 TABLET, FILM COATED ORAL at 18:06

## 2022-06-08 RX ADMIN — DOCUSATE SODIUM 100 MG: 100 CAPSULE, LIQUID FILLED ORAL at 04:27

## 2022-06-08 ASSESSMENT — COGNITIVE AND FUNCTIONAL STATUS - GENERAL
DRESSING REGULAR LOWER BODY CLOTHING: A LITTLE
EATING MEALS: A LITTLE
DRESSING REGULAR UPPER BODY CLOTHING: A LITTLE
HELP NEEDED FOR BATHING: A LITTLE
SUGGESTED CMS G CODE MODIFIER DAILY ACTIVITY: CK
PERSONAL GROOMING: A LITTLE
TOILETING: A LITTLE
DAILY ACTIVITIY SCORE: 18

## 2022-06-08 ASSESSMENT — ENCOUNTER SYMPTOMS
SPEECH CHANGE: 0
SHORTNESS OF BREATH: 0
HEADACHES: 0
NAUSEA: 0
SENSORY CHANGE: 0
INSOMNIA: 0
BACK PAIN: 0
BLURRED VISION: 0
FEVER: 0
VOMITING: 0
ABDOMINAL PAIN: 0
COUGH: 0
PALPITATIONS: 0
CHILLS: 0
FOCAL WEAKNESS: 1
EYE PAIN: 0
DIZZINESS: 0

## 2022-06-08 ASSESSMENT — LIFESTYLE VARIABLES: SUBSTANCE_ABUSE: 0

## 2022-06-08 NOTE — PROGRESS NOTES
"C/o pain to bilat feet \"like someone is squeezing and twisting them\", also c/o feet not looking the same- negative for edema/redness, deformaties. Medicate with tylenol first and then to follow up with MD  "

## 2022-06-08 NOTE — THERAPY
"Occupational Therapy  Daily Treatment     Patient Name: Reji Madrigal Jr.  Age:  56 y.o., Sex:  male  Medical Record #: [id number]  Today's Date: 6/8/2022     Precautions  Precautions: (P) Fall Risk    Assessment    Pt seen for follow up OT tx session, pt making steady progress with functional mobility and ADLs notes improvement in RUE strength, still having increased weakness in extensors versus flexors of RUE. Pt given resistive sponge and grasp/release activities to work on outside of therapy time to increase functional independence. Pt would benefit from continued skilled therapy while admitted as well as recommend post-acute placement.    Plan    Continue current treatment plan.    DC Equipment Recommendations: (P) Unable to determine at this time  Discharge Recommendations: (P) Recommend post-acute placement for additional occupational therapy services prior to discharge home    Subjective    \"My right hand still wont work\"     Objective       06/08/22 0941   Precautions   Precautions Fall Risk   Pain 0 - 10 Group   Therapist Pain Assessment Post Activity Pain Same as Prior to Activity;Nurse Notified   Cognition    Level of Consciousness Alert   Comments Pleasant, cooperative, frustrated with disease process   Other Treatments   Other Treatments Provided Provided with resistive sponge for RUE, cups for grasp/release exercises outside of therapy time, as well as wash clothes for fine motor/sensory reintroduction tasks   Balance   Sitting Balance (Static) Fair +   Sitting Balance (Dynamic) Fair   Standing Balance (Static) Fair   Standing Balance (Dynamic) Poor +   Weight Shift Sitting Fair   Weight Shift Standing Poor   Skilled Intervention Verbal Cuing   Bed Mobility    Supine to Sit Contact Guard Assist   Sit to Supine Supervised   Scooting Supervised   Rolling Standby Assist   Skilled Intervention Verbal Cuing;Facilitation   Activities of Daily Living   Eating Supervision   Grooming Supervision;Seated "   Lower Body Dressing Minimal Assist   How much help from another person does the patient currently need...   Putting on and taking off regular lower body clothing? 3   Bathing (including washing, rinsing, and drying)? 3   Toileting, which includes using a toilet, bedpan, or urinal? 3   Putting on and taking off regular upper body clothing? 3   Taking care of personal grooming such as brushing teeth? 3   Eating meals? 3   6 Clicks Daily Activity Score 18   Modified Kingsley (mRS)   Modified Kemi Score 4   Functional Mobility   Sit to Stand Contact Guard Assist   Bed, Chair, Wheelchair Transfer Contact Guard Assist   Toilet Transfers Refused   Transfer Method Stand Step   Mobility bed mobility, mobility in room, up to chair   Skilled Intervention Verbal Cuing   Comments w/ HHA   Activity Tolerance   Sitting in Chair left seated in chair   Sitting Edge of Bed 10 min   Standing 10 min   Patient / Family Goals   Patient / Family Goal #1 To regain the use of his R hand   Goal #1 Outcome Progressing as expected   Short Term Goals   Short Term Goal # 1 Pt will demo LB dressing w/ SPV   Goal Outcome # 1 Progressing as expected   Short Term Goal # 2 Pt will grasp and release 3 objects w/ SPV using R hand   Goal Outcome # 2 Progressing as expected   Short Term Goal # 3 Pt will demo standing grooming w/ SPV   Goal Outcome # 3 Progressing as expected   Short Term Goal # 4 Pt will increase strength in hand extensors to 3/5   Goal Outcome # 4 Progressing slower than expected   Education Group   Education Provided Role of Occupational Therapist;Stroke;Upper Extremity Strengthening   Role of Occupational Therapist Patient Response Patient;Acceptance;Explanation;Demonstration;Verbal Demonstration;Action Demonstration   UE Strengthening Patient Response Patient;Acceptance;Explanation;Action Demonstration;Reinforcement Needed   Stroke Patient Response Patient;Acceptance;Explanation;Reinforcement Needed   Anticipated Discharge  Equipment and Recommendations   DC Equipment Recommendations Unable to determine at this time   Discharge Recommendations Recommend post-acute placement for additional occupational therapy services prior to discharge home   Interdisciplinary Plan of Care Collaboration   IDT Collaboration with  Nursing   Patient Position at End of Therapy Seated;Chair Alarm On;Call Light within Reach;Tray Table within Reach;Phone within Reach   Collaboration Comments RN updated

## 2022-06-08 NOTE — THERAPY
PT Contact Note:    PT treatment attempted on 2 occassions this afternoon. Pt first on the phone and then during second attempt was filling out a time sensitive apartment application. PT will reattempt treatment session as able.    Day Troy, PT, DPT    Voalte [id number]

## 2022-06-08 NOTE — CARE PLAN
The patient is Stable - Low risk of patient condition declining or worsening    Shift Goals  Clinical Goals: maintain safety  Patient Goals: work with therapy    Progress made toward(s) clinical / shift goals:  Pt remains free of falls with safety precautions in place. Pt was able to work with therapy this am and get up to the chair and shower.     Patient is not progressing towards the following goals:

## 2022-06-08 NOTE — CARE PLAN
The patient is Stable - Low risk of patient condition declining or worsening    Shift Goals  Clinical Goals: maintain safety with interventions in place  Patient Goals: rehab placement    Progress made toward(s) clinical / shift goals:  fall interventions in place; fall history    Patient is not progressing towards the following goals:      Problem: Self Care  Goal: Patient will have the ability to perform ADLs independently or with assistance (bathe, groom, dress, toilet and feed)  Outcome: Not Met

## 2022-06-08 NOTE — PROGRESS NOTES
[facility] Medicine Daily Progress Note    Date of Service  6/8/2022    Chief Complaint  Reji Madrigal Jr. is a 56 y.o. male admitted 6/3/2022 with stroke.    Hospital Course  Reji Madrigal Jr. is a 56 y.o. male who presented 6/3/2022 with right-sided weakness.  The patient has a history of hypertension diabetes and coronary artery disease was admitted to [facility] after presenting with right-sided weakness and was diagnosed with acute stroke he subsequently left AMA and presented to our emergency department and was emergency department with failed attempt to place him in rehab.  The patient reports that his right-sided weakness is improving.  He denies any headache.  His dysarthria is improved and is tolerating his diet.  No fever or chills.  The patient had a work-up for stroke at the outlying facility with noncontrast MRI revealing 2 small areas of acute infarct in the left corona radiata and previous right thalamic infarction.    Interval Problem Update  No acute events overnight.  Patient working with PT and nursing.  Patient is medically cleared for SNF.      I have personally seen and examined the patient at bedside. I discussed the plan of care with patient.    Consultants/Specialty  none    Code Status  Full Code    Disposition  Patient is medically cleared for discharge.   Anticipate discharge to to skilled nursing facility.  I have placed the appropriate orders for post-discharge needs.    Review of Systems  Review of Systems   Constitutional: Negative for chills and fever.   Eyes: Negative for blurred vision and pain.   Respiratory: Negative for cough and shortness of breath.    Cardiovascular: Negative for chest pain, palpitations and leg swelling.   Gastrointestinal: Negative for abdominal pain, nausea and vomiting.   Genitourinary: Negative for dysuria and urgency.   Musculoskeletal: Negative for back pain.   Skin: Negative for itching and rash.   Neurological: Positive for  focal weakness. Negative for dizziness, sensory change, speech change and headaches.   Psychiatric/Behavioral: Negative for substance abuse. The patient does not have insomnia.         Physical Exam  Temp:  [36.3 °C (97.3 °F)-36.5 °C (97.7 °F)] 36.3 °C (97.3 °F)  Pulse:  [71-78] 78  Resp:  [17-18] 17  BP: (123-147)/(80-93) 123/80  SpO2:  [93 %-98 %] 98 %    Physical Exam  Constitutional:       General: He is not in acute distress.     Appearance: He is not ill-appearing.   HENT:      Head: Normocephalic and atraumatic.      Right Ear: External ear normal.      Left Ear: External ear normal.      Mouth/Throat:      Pharynx: No oropharyngeal exudate or posterior oropharyngeal erythema.   Eyes:      Extraocular Movements: Extraocular movements intact.      Pupils: Pupils are equal, round, and reactive to light.   Cardiovascular:      Rate and Rhythm: Normal rate and regular rhythm.      Pulses: Normal pulses.      Heart sounds: Normal heart sounds.   Pulmonary:      Effort: Pulmonary effort is normal. No respiratory distress.      Breath sounds: Normal breath sounds. No wheezing or rales.   Abdominal:      General: Bowel sounds are normal. There is no distension.      Palpations: Abdomen is soft.      Tenderness: There is no abdominal tenderness. There is no guarding or rebound.   Musculoskeletal:         General: No swelling or tenderness.      Cervical back: Normal range of motion and neck supple.      Right lower leg: No edema.      Left lower leg: No edema.   Skin:     General: Skin is warm and dry.   Neurological:      General: No focal deficit present.      Mental Status: He is oriented to person, place, and time.      Cranial Nerves: No cranial nerve deficit.      Sensory: No sensory deficit.      Motor: Weakness present.   Psychiatric:         Mood and Affect: Mood normal.         Behavior: Behavior normal.         Fluids    Intake/Output Summary (Last 24 hours) at 6/8/2022 1129  Last data filed at 6/7/2022  2000  Gross per 24 hour   Intake 720 ml   Output --   Net 720 ml       Laboratory      Recent Labs     06/07/22  0231   SODIUM 141   POTASSIUM 3.5*   CHLORIDE 106   CO2 24   GLUCOSE 196*   BUN 20   CREATININE 1.23   CALCIUM 9.3             Recent Labs     06/07/22  0231   TRIGLYCERIDE 191*   HDL 32*   *       Imaging  No orders to display        Assessment/Plan  * CVA (cerebral vascular accident) ([facility])  Assessment & Plan  Continue aspirin Plavix  Continue atorvastatin  Blood pressure control  PT OT SLP  Case management to assist with discharge planning    Type 2 diabetes mellitus with hyperglycemia, with long-term current use of insulin ([facility])  Assessment & Plan  Continue with increase Lantus 18 units continue sliding scale insulin monitor CBGs    Primary hypertension  Assessment & Plan  Continue amlodipineand losartan  Increase carvedilol 25 bid   Goal SBP is normotension  Monitor blood pressure and adjust accordingly    Hemiparesis affecting right side as late effect of stroke ([facility])  Assessment & Plan  Continue aspirin and statin  Blood pressure control  PT OT SLP  Discharge planning       VTE prophylaxis: enoxaparin ppx    I have performed a physical exam and reviewed and updated ROS and Plan today (6/8/2022). In review of yesterday's note (6/7/2022), there are no changes except as documented above.

## 2022-06-09 LAB
GLUCOSE BLD STRIP.AUTO-MCNC: 146 MG/DL (ref 65–99)
GLUCOSE BLD STRIP.AUTO-MCNC: 196 MG/DL (ref 65–99)
GLUCOSE BLD STRIP.AUTO-MCNC: 206 MG/DL (ref 65–99)
GLUCOSE BLD STRIP.AUTO-MCNC: 239 MG/DL (ref 65–99)

## 2022-06-09 PROCEDURE — 97535 SELF CARE MNGMENT TRAINING: CPT

## 2022-06-09 PROCEDURE — 96372 THER/PROPH/DIAG INJ SC/IM: CPT

## 2022-06-09 PROCEDURE — 97530 THERAPEUTIC ACTIVITIES: CPT

## 2022-06-09 PROCEDURE — 700102 HCHG RX REV CODE 250 W/ 637 OVERRIDE(OP): Performed by: HOSPITALIST

## 2022-06-09 PROCEDURE — 97110 THERAPEUTIC EXERCISES: CPT

## 2022-06-09 PROCEDURE — 82962 GLUCOSE BLOOD TEST: CPT | Mod: 91

## 2022-06-09 PROCEDURE — C9803 HOPD COVID-19 SPEC COLLECT: HCPCS | Performed by: STUDENT IN AN ORGANIZED HEALTH CARE EDUCATION/TRAINING PROGRAM

## 2022-06-09 PROCEDURE — A9270 NON-COVERED ITEM OR SERVICE: HCPCS | Performed by: HOSPITALIST

## 2022-06-09 PROCEDURE — 700111 HCHG RX REV CODE 636 W/ 250 OVERRIDE (IP): Performed by: HOSPITALIST

## 2022-06-09 PROCEDURE — 99224 PR SUBSEQUENT OBSERVATION CARE,LEVEL I: CPT | Performed by: STUDENT IN AN ORGANIZED HEALTH CARE EDUCATION/TRAINING PROGRAM

## 2022-06-09 PROCEDURE — G0378 HOSPITAL OBSERVATION PER HR: HCPCS

## 2022-06-09 RX ADMIN — AMLODIPINE BESYLATE 10 MG: 5 TABLET ORAL at 05:06

## 2022-06-09 RX ADMIN — ASPIRIN 81 MG: 81 TABLET, COATED ORAL at 05:06

## 2022-06-09 RX ADMIN — CARVEDILOL 25 MG: 25 TABLET, FILM COATED ORAL at 08:48

## 2022-06-09 RX ADMIN — DOCUSATE SODIUM 50 MG AND SENNOSIDES 8.6 MG 2 TABLET: 8.6; 5 TABLET, FILM COATED ORAL at 17:50

## 2022-06-09 RX ADMIN — INSULIN HUMAN 6 UNITS: 100 INJECTION, SOLUTION PARENTERAL at 13:14

## 2022-06-09 RX ADMIN — LOSARTAN POTASSIUM 100 MG: 50 TABLET, FILM COATED ORAL at 05:05

## 2022-06-09 RX ADMIN — ENOXAPARIN SODIUM 40 MG: 40 INJECTION SUBCUTANEOUS at 17:47

## 2022-06-09 RX ADMIN — INSULIN HUMAN 3 UNITS: 100 INJECTION, SOLUTION PARENTERAL at 08:48

## 2022-06-09 RX ADMIN — CLOPIDOGREL BISULFATE 75 MG: 75 TABLET ORAL at 05:06

## 2022-06-09 RX ADMIN — INSULIN HUMAN 6 UNITS: 100 INJECTION, SOLUTION PARENTERAL at 21:13

## 2022-06-09 RX ADMIN — DOCUSATE SODIUM 100 MG: 100 CAPSULE, LIQUID FILLED ORAL at 17:50

## 2022-06-09 RX ADMIN — INSULIN GLARGINE-YFGN 15 UNITS: 100 INJECTION, SOLUTION SUBCUTANEOUS at 08:48

## 2022-06-09 RX ADMIN — ATORVASTATIN CALCIUM 80 MG: 40 TABLET, FILM COATED ORAL at 17:47

## 2022-06-09 RX ADMIN — CARVEDILOL 25 MG: 25 TABLET, FILM COATED ORAL at 17:47

## 2022-06-09 ASSESSMENT — ENCOUNTER SYMPTOMS
FOCAL WEAKNESS: 1
NAUSEA: 0
EYE PAIN: 0
BACK PAIN: 0
CHILLS: 0
ABDOMINAL PAIN: 0
SENSORY CHANGE: 0
PALPITATIONS: 0
COUGH: 0
INSOMNIA: 0
VOMITING: 0
BLURRED VISION: 0
SHORTNESS OF BREATH: 0
FEVER: 0
HEADACHES: 0
DIZZINESS: 0
SPEECH CHANGE: 0

## 2022-06-09 ASSESSMENT — COGNITIVE AND FUNCTIONAL STATUS - GENERAL
DRESSING REGULAR LOWER BODY CLOTHING: A LITTLE
TOILETING: A LITTLE
MOVING FROM LYING ON BACK TO SITTING ON SIDE OF FLAT BED: UNABLE
MOVING FROM LYING ON BACK TO SITTING ON SIDE OF FLAT BED: A LITTLE
WALKING IN HOSPITAL ROOM: A LITTLE
MOBILITY SCORE: 14
PERSONAL GROOMING: A LITTLE
SUGGESTED CMS G CODE MODIFIER MOBILITY: CL
STANDING UP FROM CHAIR USING ARMS: A LITTLE
DAILY ACTIVITIY SCORE: 18
SUGGESTED CMS G CODE MODIFIER DAILY ACTIVITY: CK
SUGGESTED CMS G CODE MODIFIER MOBILITY: CK
WALKING IN HOSPITAL ROOM: A LITTLE
CLIMB 3 TO 5 STEPS WITH RAILING: A LOT
EATING MEALS: A LITTLE
MOVING TO AND FROM BED TO CHAIR: UNABLE
MOVING TO AND FROM BED TO CHAIR: A LITTLE
CLIMB 3 TO 5 STEPS WITH RAILING: A LOT
MOBILITY SCORE: 18
HELP NEEDED FOR BATHING: A LITTLE
DRESSING REGULAR UPPER BODY CLOTHING: A LITTLE
STANDING UP FROM CHAIR USING ARMS: A LITTLE

## 2022-06-09 ASSESSMENT — GAIT ASSESSMENTS
GAIT LEVEL OF ASSIST: MINIMAL ASSIST
DISTANCE (FEET): 40
DEVIATION: STEP TO
ASSISTIVE DEVICE: FRONT WHEEL WALKER

## 2022-06-09 ASSESSMENT — LIFESTYLE VARIABLES: SUBSTANCE_ABUSE: 0

## 2022-06-09 ASSESSMENT — PATIENT HEALTH QUESTIONNAIRE - PHQ9
SUM OF ALL RESPONSES TO PHQ9 QUESTIONS 1 AND 2: 0
1. LITTLE INTEREST OR PLEASURE IN DOING THINGS: NOT AT ALL

## 2022-06-09 NOTE — THERAPY
Missed Therapy     Patient Name: Reji Madrigal Jr.  Age:  56 y.o., Sex:  male  Medical Record #: [id number]  Today's Date: 6/9/2022    Discussed missed therapy with nursing.        06/09/22 0747   Treatment Variance   Reason For Missed Therapy Non-Medical - Other (Please Comment)  (Eval not indicated)   Interdisciplinary Plan of Care Collaboration   Collaboration Comments RN reports cog eval is not indicated per MD

## 2022-06-09 NOTE — DISCHARGE PLANNING
Agency/Facility Name: [facility]   Spoke To:  Denis   Outcome:Per Admissions Coordinator, patient is needing a solid discharge plan after SNF.   Per LSW patients is looking for an new apartment towards the end of the month however, Admissions will still need  confirmation where the patients will be discharging.

## 2022-06-09 NOTE — CARE PLAN
The patient is Stable - Low risk of patient condition declining or worsening    Shift Goals  Clinical Goals: Patient will remain free from falls    Progress made toward(s) clinical / shift goals:    Fall prevention tactics are in place, bed locked and in lowest position, non-skid footwear in use and bed alarm in place for safety. Patient has remained free from falls.     Problem: Optimal Care of the Stroke Patient  Goal: Optimal acute care for the stroke patient  Outcome: Progressing     Problem: Neuro Status  Goal: Neuro status will remain stable or improve  Outcome: Progressing     Problem: Urinary Elimination  Goal: Establish and maintain regular urinary output  Outcome: Progressing     Problem: Bowel Elimination  Goal: Establish and maintain regular bowel function  Outcome: Progressing     Problem: Self Care  Goal: Patient will have the ability to perform ADLs independently or with assistance (bathe, groom, dress, toilet and feed)  Outcome: Progressing     Problem: Skin Integrity  Goal: Skin integrity is maintained or improved  Outcome: Progressing     Problem: Fall Risk  Goal: Patient will remain free from falls  Outcome: Progressing     Problem: Pain - Standard  Goal: Alleviation of pain or a reduction in pain to the patient’s comfort goal  Outcome: Progressing

## 2022-06-09 NOTE — CARE PLAN
The patient is Stable - Low risk of patient condition declining or worsening    Shift Goals  Clinical Goals: maintain safety  Patient Goals: work with therapy    Progress made toward(s) clinical / shift goals:    Problem: Optimal Care of the Stroke Patient  Goal: Optimal emergency care for the stroke patient  Outcome: Progressing  Goal: Optimal acute care for the stroke patient  Outcome: Progressing     Problem: Knowledge Deficit - Stroke Education  Goal: Patient's knowledge of stroke and risk factors will improve  Outcome: Progressing       Patient is not progressing towards the following goals:

## 2022-06-09 NOTE — PROGRESS NOTES
Hospital Medicine Daily Progress Note    Date of Service  6/9/2022    Chief Complaint  Reji Madrigal Jr. is a 56 y.o. male admitted 6/3/2022 with stroke.    Hospital Course  Reji Madrigal Jr. is a 56 y.o. male who presented 6/3/2022 with right-sided weakness.  The patient has a history of hypertension diabetes and coronary artery disease was admitted to [facility] after presenting with right-sided weakness and was diagnosed with acute stroke he subsequently left AMA and presented to our emergency department and was emergency department with failed attempt to place him in rehab.  The patient reports that his right-sided weakness is improving.  He denies any headache.  His dysarthria is improved and is tolerating his diet.  No fever or chills.  The patient had a work-up for stroke at the outlying facility with noncontrast MRI revealing 2 small areas of acute infarct in the left corona radiata and previous right thalamic infarction.    Interval Problem Update  No acute events overnight.  Patient frustrated with waiting for SNF.  Patient is medically cleared for SNF.  COVID test ordered.    I have personally seen and examined the patient at bedside. I discussed the plan of care with patient.    Consultants/Specialty  none    Code Status  Full Code    Disposition  Patient is medically cleared for discharge.   Anticipate discharge to to skilled nursing facility.  I have placed the appropriate orders for post-discharge needs.    Review of Systems  Review of Systems   Constitutional: Negative for chills and fever.   Eyes: Negative for blurred vision and pain.   Respiratory: Negative for cough and shortness of breath.    Cardiovascular: Negative for chest pain, palpitations and leg swelling.   Gastrointestinal: Negative for abdominal pain, nausea and vomiting.   Genitourinary: Negative for dysuria and urgency.   Musculoskeletal: Negative for back pain.   Skin: Negative for itching and rash.    Neurological: Positive for focal weakness. Negative for dizziness, sensory change, speech change and headaches.   Psychiatric/Behavioral: Negative for substance abuse. The patient does not have insomnia.         Physical Exam  Temp:  [36.4 °C (97.6 °F)-36.9 °C (98.5 °F)] 36.5 °C (97.7 °F)  Pulse:  [60-82] 60  Resp:  [16-18] 16  BP: (126-148)/(54-81) 131/54  SpO2:  [94 %-96 %] 94 %    Physical Exam  Constitutional:       General: He is not in acute distress.     Appearance: He is not ill-appearing.   HENT:      Head: Normocephalic and atraumatic.      Right Ear: External ear normal.      Left Ear: External ear normal.      Mouth/Throat:      Pharynx: No oropharyngeal exudate or posterior oropharyngeal erythema.   Eyes:      Extraocular Movements: Extraocular movements intact.      Pupils: Pupils are equal, round, and reactive to light.   Cardiovascular:      Rate and Rhythm: Normal rate and regular rhythm.      Pulses: Normal pulses.      Heart sounds: Normal heart sounds.   Pulmonary:      Effort: Pulmonary effort is normal. No respiratory distress.      Breath sounds: Normal breath sounds. No wheezing or rales.   Abdominal:      General: Bowel sounds are normal. There is no distension.      Palpations: Abdomen is soft.      Tenderness: There is no abdominal tenderness. There is no guarding or rebound.   Musculoskeletal:         General: No swelling or tenderness.      Cervical back: Normal range of motion and neck supple.      Right lower leg: No edema.      Left lower leg: No edema.   Skin:     General: Skin is warm and dry.   Neurological:      General: No focal deficit present.      Mental Status: He is oriented to person, place, and time.      Cranial Nerves: No cranial nerve deficit.      Sensory: No sensory deficit.      Motor: Weakness present.   Psychiatric:         Mood and Affect: Mood normal.         Behavior: Behavior normal.         Fluids    Intake/Output Summary (Last 24 hours) at 6/9/2022  1253  Last data filed at 6/9/2022 0437  Gross per 24 hour   Intake --   Output 1000 ml   Net -1000 ml       Laboratory      Recent Labs     06/07/22  0231   SODIUM 141   POTASSIUM 3.5*   CHLORIDE 106   CO2 24   GLUCOSE 196*   BUN 20   CREATININE 1.23   CALCIUM 9.3             Recent Labs     06/07/22  0231   TRIGLYCERIDE 191*   HDL 32*   *       Imaging  No orders to display        Assessment/Plan  * CVA (cerebral vascular accident) ([facility])  Assessment & Plan  Continue aspirin Plavix  Continue atorvastatin  Blood pressure control  PT OT SLP  Case management to assist with discharge planning    Type 2 diabetes mellitus with hyperglycemia, with long-term current use of insulin ([facility])  Assessment & Plan  Continue with increase Lantus 18 units continue sliding scale insulin monitor CBGs    Primary hypertension  Assessment & Plan  Continue amlodipineand losartan  Increase carvedilol 25 bid   Goal SBP is normotension  Monitor blood pressure and adjust accordingly    Hemiparesis affecting right side as late effect of stroke ([facility])  Assessment & Plan  Continue aspirin and statin  Blood pressure control  PT OT SLP  Discharge planning       VTE prophylaxis: enoxaparin ppx    I have performed a physical exam and reviewed and updated ROS and Plan today (6/9/2022). In review of yesterday's note (6/8/2022), there are no changes except as documented above.

## 2022-06-10 LAB
GLUCOSE BLD STRIP.AUTO-MCNC: 181 MG/DL (ref 65–99)
GLUCOSE BLD STRIP.AUTO-MCNC: 195 MG/DL (ref 65–99)
GLUCOSE BLD STRIP.AUTO-MCNC: 204 MG/DL (ref 65–99)
GLUCOSE BLD STRIP.AUTO-MCNC: 209 MG/DL (ref 65–99)

## 2022-06-10 PROCEDURE — A9270 NON-COVERED ITEM OR SERVICE: HCPCS | Performed by: HOSPITALIST

## 2022-06-10 PROCEDURE — 97110 THERAPEUTIC EXERCISES: CPT

## 2022-06-10 PROCEDURE — 700111 HCHG RX REV CODE 636 W/ 250 OVERRIDE (IP): Performed by: HOSPITALIST

## 2022-06-10 PROCEDURE — G0378 HOSPITAL OBSERVATION PER HR: HCPCS

## 2022-06-10 PROCEDURE — 99224 PR SUBSEQUENT OBSERVATION CARE,LEVEL I: CPT | Performed by: STUDENT IN AN ORGANIZED HEALTH CARE EDUCATION/TRAINING PROGRAM

## 2022-06-10 PROCEDURE — 82962 GLUCOSE BLOOD TEST: CPT | Mod: 91

## 2022-06-10 PROCEDURE — 97530 THERAPEUTIC ACTIVITIES: CPT

## 2022-06-10 PROCEDURE — 96372 THER/PROPH/DIAG INJ SC/IM: CPT

## 2022-06-10 PROCEDURE — 700102 HCHG RX REV CODE 250 W/ 637 OVERRIDE(OP): Performed by: HOSPITALIST

## 2022-06-10 RX ADMIN — ATORVASTATIN CALCIUM 80 MG: 40 TABLET, FILM COATED ORAL at 16:51

## 2022-06-10 RX ADMIN — INSULIN HUMAN 6 UNITS: 100 INJECTION, SOLUTION PARENTERAL at 17:09

## 2022-06-10 RX ADMIN — ENOXAPARIN SODIUM 40 MG: 40 INJECTION SUBCUTANEOUS at 16:51

## 2022-06-10 RX ADMIN — AMLODIPINE BESYLATE 10 MG: 5 TABLET ORAL at 06:02

## 2022-06-10 RX ADMIN — INSULIN HUMAN 3 UNITS: 100 INJECTION, SOLUTION PARENTERAL at 12:14

## 2022-06-10 RX ADMIN — INSULIN GLARGINE-YFGN 15 UNITS: 100 INJECTION, SOLUTION SUBCUTANEOUS at 08:14

## 2022-06-10 RX ADMIN — ASPIRIN 81 MG: 81 TABLET, COATED ORAL at 06:02

## 2022-06-10 RX ADMIN — INSULIN HUMAN 3 UNITS: 100 INJECTION, SOLUTION PARENTERAL at 08:14

## 2022-06-10 RX ADMIN — CLOPIDOGREL BISULFATE 75 MG: 75 TABLET ORAL at 06:02

## 2022-06-10 RX ADMIN — LOSARTAN POTASSIUM 100 MG: 50 TABLET, FILM COATED ORAL at 06:02

## 2022-06-10 RX ADMIN — CARVEDILOL 25 MG: 25 TABLET, FILM COATED ORAL at 16:50

## 2022-06-10 RX ADMIN — INSULIN HUMAN 6 UNITS: 100 INJECTION, SOLUTION PARENTERAL at 21:43

## 2022-06-10 RX ADMIN — CARVEDILOL 25 MG: 25 TABLET, FILM COATED ORAL at 08:25

## 2022-06-10 ASSESSMENT — GAIT ASSESSMENTS
GAIT LEVEL OF ASSIST: CONTACT GUARD ASSIST
DISTANCE (FEET): 20
ASSISTIVE DEVICE: FRONT WHEEL WALKER

## 2022-06-10 ASSESSMENT — ENCOUNTER SYMPTOMS
CHILLS: 0
DIZZINESS: 0
COUGH: 0
PALPITATIONS: 0
BLURRED VISION: 0
INSOMNIA: 0
FOCAL WEAKNESS: 1
SENSORY CHANGE: 0
SHORTNESS OF BREATH: 0
SPEECH CHANGE: 0
FEVER: 0
BACK PAIN: 0
HEADACHES: 0
EYE PAIN: 0
NAUSEA: 0
ABDOMINAL PAIN: 0
VOMITING: 0

## 2022-06-10 ASSESSMENT — COGNITIVE AND FUNCTIONAL STATUS - GENERAL
CLIMB 3 TO 5 STEPS WITH RAILING: A LOT
MOVING FROM LYING ON BACK TO SITTING ON SIDE OF FLAT BED: UNABLE
MOVING TO AND FROM BED TO CHAIR: UNABLE
STANDING UP FROM CHAIR USING ARMS: A LITTLE
SUGGESTED CMS G CODE MODIFIER MOBILITY: CL
MOBILITY SCORE: 14
WALKING IN HOSPITAL ROOM: A LITTLE

## 2022-06-10 ASSESSMENT — LIFESTYLE VARIABLES: SUBSTANCE_ABUSE: 0

## 2022-06-10 NOTE — DISCHARGE PLANNING
Anticipated Discharge Disposition: SNF    Action: Per patient he has his current apartment until the end of the month and then will need to find a new apartment after that. Patient assessed by [facility] yesterday.     RN PANCHO called Yvan in [facility] admissions and left voicemail updating on patient's current living situation and plan.     Barriers to Discharge: SNF acceptance    Plan: Follow for discharge planning needs.

## 2022-06-10 NOTE — PROGRESS NOTES
This RN attempted to collect Covid  test per order. Patient refused testing at this time. Attempted to educate patient regarding Covid nasal swab test and need for test for possible placement. Patient continued to refuse despite education.

## 2022-06-10 NOTE — THERAPY
Physical Therapy   Daily Treatment     Patient Name: Reji Madrigal Jr.  Age:  56 y.o., Sex:  male  Medical Record #: [id number]  Today's Date: 6/10/2022     Precautions  Precautions: Fall Risk    Assessment    Pt willing to cooperate and participate with therapy upon entering room. Immediately c/o of increased R ankle pain and wanted it to be focus of session. Able to ambulate 20 ft x 5 inside room w/FWW under CGA, no c/o of increased fatigue or pain and able to navigate well within room. Additionally provided ice massage for R ankle at end of session which provided relief for pt. Will continue to follow to address deficits.     Plan    Continue current treatment plan.    DC Equipment Recommendations: Unable to determine at this time  Discharge Recommendations: Recommend post-acute placement for additional physical therapy services prior to discharge home       Objective       06/10/22 1022   Cognition    Cognition / Consciousness X   Level of Consciousness Alert   Comments cooperative and willing to participate with therapy, can get easily frustrated at times and at times has difficulity focusing on task   Active ROM Lower Body    Active ROM Lower Body  WDL   Other Treatments   Other Treatments Provided Provided A-P R ankle mobilizations, as well as ice massage to provide relief for increased pain   Balance   Sitting Balance (Static) Fair +   Sitting Balance (Dynamic) Fair +   Standing Balance (Static) Fair   Standing Balance (Dynamic) Fair -   Weight Shift Sitting Good   Weight Shift Standing Fair   Gait Analysis   Gait Level Of Assist Contact Guard Assist   Assistive Device Front Wheel Walker   Distance (Feet) 20   # of Times Distance was Traveled 5   # of Stairs Climbed 0   Weight Bearing Status NR   Comments ambulated inside room, no complaints of increased fatigue or pain; able to navigate well with FWW however did have difficulty grasping with R hand   Bed Mobility    Supine to Sit Supervised  (with  railing, HOB raised)   Sit to Supine Supervised   Scooting Supervised   Rolling Supervised   Functional Mobility   Sit to Stand Contact Guard Assist   Mobility bed mobility, STS, ambulation inside room

## 2022-06-10 NOTE — CARE PLAN
The patient is Stable - Low risk of patient condition declining or worsening    Shift Goals  Clinical Goals: Patient will remain free from falls  Patient Goals: work with therapy    Progress made toward(s) clinical / shift goals:    Problem: Optimal Care of the Stroke Patient  Goal: Optimal emergency care for the stroke patient  Outcome: Progressing  Goal: Optimal acute care for the stroke patient  Outcome: Progressing     Problem: Knowledge Deficit - Stroke Education  Goal: Patient's knowledge of stroke and risk factors will improve  Outcome: Progressing       Patient is not progressing towards the following goals:

## 2022-06-10 NOTE — PROGRESS NOTES
Hospital Medicine Daily Progress Note    Date of Service  6/10/2022    Chief Complaint  Reji Madrigal Jr. is a 56 y.o. male admitted 6/3/2022 with stroke.    Hospital Course  Reji Madrigal Jr. is a 56 y.o. male who presented 6/3/2022 with right-sided weakness.  The patient has a history of hypertension diabetes and coronary artery disease was admitted to [facility] after presenting with right-sided weakness and was diagnosed with acute stroke he subsequently left AMA and presented to our emergency department and was emergency department with failed attempt to place him in rehab.  The patient reports that his right-sided weakness is improving.  He denies any headache.  His dysarthria is improved and is tolerating his diet.  No fever or chills.  The patient had a work-up for stroke at the outlying facility with noncontrast MRI revealing 2 small areas of acute infarct in the left corona radiata and previous right thalamic infarction.    Interval Problem Update  No acute events overnight.  Patient is medically cleared for SNF.  COVID test ordered.    I have personally seen and examined the patient at bedside. I discussed the plan of care with patient.    Consultants/Specialty  none    Code Status  Full Code    Disposition  Patient is medically cleared for discharge.   Anticipate discharge to to skilled nursing facility.  I have placed the appropriate orders for post-discharge needs.    Review of Systems  Review of Systems   Constitutional: Negative for chills and fever.   Eyes: Negative for blurred vision and pain.   Respiratory: Negative for cough and shortness of breath.    Cardiovascular: Negative for chest pain, palpitations and leg swelling.   Gastrointestinal: Negative for abdominal pain, nausea and vomiting.   Genitourinary: Negative for dysuria and urgency.   Musculoskeletal: Negative for back pain.   Skin: Negative for itching and rash.   Neurological: Positive for focal weakness.  Negative for dizziness, sensory change, speech change and headaches.   Psychiatric/Behavioral: Negative for substance abuse. The patient does not have insomnia.         Physical Exam  Temp:  [36.2 °C (97.2 °F)-36.6 °C (97.8 °F)] 36.2 °C (97.2 °F)  Pulse:  [60-79] 60  Resp:  [16-18] 16  BP: (122-163)/(67-85) 163/80  SpO2:  [97 %-99 %] 98 %    Physical Exam  Constitutional:       General: He is not in acute distress.     Appearance: He is not ill-appearing.   HENT:      Head: Normocephalic and atraumatic.      Right Ear: External ear normal.      Left Ear: External ear normal.      Mouth/Throat:      Pharynx: No oropharyngeal exudate or posterior oropharyngeal erythema.   Eyes:      Extraocular Movements: Extraocular movements intact.      Pupils: Pupils are equal, round, and reactive to light.   Cardiovascular:      Rate and Rhythm: Normal rate and regular rhythm.      Pulses: Normal pulses.      Heart sounds: Normal heart sounds.   Pulmonary:      Effort: Pulmonary effort is normal. No respiratory distress.      Breath sounds: Normal breath sounds. No wheezing or rales.   Abdominal:      General: Bowel sounds are normal. There is no distension.      Palpations: Abdomen is soft.      Tenderness: There is no abdominal tenderness. There is no guarding or rebound.   Musculoskeletal:         General: No swelling or tenderness.      Cervical back: Normal range of motion and neck supple.      Right lower leg: No edema.      Left lower leg: No edema.   Skin:     General: Skin is warm and dry.   Neurological:      General: No focal deficit present.      Mental Status: He is oriented to person, place, and time.      Cranial Nerves: No cranial nerve deficit.      Sensory: No sensory deficit.      Motor: Weakness present.   Psychiatric:         Mood and Affect: Mood normal.         Behavior: Behavior normal.         Fluids    Intake/Output Summary (Last 24 hours) at 6/10/2022 1222  Last data filed at 6/10/2022 0612  Gross per  24 hour   Intake 200 ml   Output 800 ml   Net -600 ml       Laboratory                        Imaging  No orders to display        Assessment/Plan  * CVA (cerebral vascular accident) ([facility])  Assessment & Plan  Continue aspirin Plavix  Continue atorvastatin  Blood pressure control  PT OT SLP  Case management to assist with discharge planning    Type 2 diabetes mellitus with hyperglycemia, with long-term current use of insulin ([facility])  Assessment & Plan  Continue with increase Lantus 18 units continue sliding scale insulin monitor CBGs    Primary hypertension  Assessment & Plan  Continue amlodipineand losartan  Increase carvedilol 25 bid   Goal SBP is normotension  Monitor blood pressure and adjust accordingly    Hemiparesis affecting right side as late effect of stroke ([facility])  Assessment & Plan  Continue aspirin and statin  Blood pressure control  PT OT SLP  Discharge planning       VTE prophylaxis: enoxaparin ppx    I have performed a physical exam and reviewed and updated ROS and Plan today (6/10/2022). In review of yesterday's note (6/9/2022), there are no changes except as documented above.

## 2022-06-10 NOTE — THERAPY
Physical Therapy   Daily Treatment     Patient Name: Reji Madrigal Jr.  Age:  56 y.o., Sex:  male  Medical Record #: [id number]  Today's Date: 6/9/2022          Assessment    Pt progressing well; reporting right sided multifitus strain from doing leg lifts, discussed mechanics and heel slides rather for focus on motor control rather than SLR; able to increase gait to min A with FWW x 40ft, limited by fatigue but very motivated; good tolerance to balance exercises; will follow, needs placement.     Plan    Continue current treatment plan.    DC Equipment Recommendations: Unable to determine at this time  Discharge Recommendations: Recommend post-acute placement for additional physical therapy services prior to discharge home      Abridged Subjective/Objective     06/09/22 1650   Cognition    Cognition / Consciousness X   Level of Consciousness Alert   Safety Awareness Impaired   New Learning Impaired   Attention Impaired   Comments cooperative; frustrated with overall care; pt on phone each time this therapist entered and exited room, reporting he tried to not be busy when this therpaist came; dry sense of humor   Passive ROM Lower Body   Passive ROM Lower Body WDL   Balance   Sitting Balance (Static) Fair +   Sitting Balance (Dynamic) Fair +   Standing Balance (Static) Fair   Standing Balance (Dynamic) Fair -   Weight Shift Sitting Good   Weight Shift Standing Fair   Skilled Intervention Postural Facilitation;Sequencing;Tactile Cuing;Verbal Cuing   Comments B Ue support in sitting/standing; no loss of blaance but needs cues to attend to task and keep right hand on walker; wokred on standing statcially with eyes open, closed and reaching tasks   Gait Analysis   Gait Level Of Assist Minimal Assist   Assistive Device Front Wheel Walker   Distance (Feet) 40   # of Times Distance was Traveled 1   Deviation Step To   Weight Bearing Status full   Comments distance limited by pt inattention, focusing on other tasks from  gait   Bed Mobility    Supine to Sit Minimal Assist  (with railing, HOB rasied)   Functional Mobility   Sit to Stand Contact Guard Assist   Bed, Chair, Wheelchair Transfer Contact Guard Assist   Patient / Family Goals    Patient / Family Goal #1 to improve amb goals   Goal #1 Outcome Progressing as expected   Short Term Goals    Short Term Goal # 1 Pt will perform supine<>sit from flat HOB/no railing with supervision within 6 visits to ensure independent mobiltiy at home.   Goal Outcome # 1 goal not met   Short Term Goal # 2 Pt will perform sit<>stand with FWW and supervision within 6 visits to ensure independent mobility at home.   Goal Outcome # 2 Goal not met   Short Term Goal # 3 Pt will ambulate x 150ft with FWW and supervision within 6 visits to ensure independent mobility at home.   Goal Outcome # 3 Goal not met   Short Term Goal # 4 Pt will ascend/descend 4 stairs with B UE support and supervision within 6 visits to ensure independent mobility at home.   Goal Outcome # 4 Goal not met

## 2022-06-11 LAB
GLUCOSE BLD STRIP.AUTO-MCNC: 186 MG/DL (ref 65–99)
GLUCOSE BLD STRIP.AUTO-MCNC: 226 MG/DL (ref 65–99)

## 2022-06-11 PROCEDURE — A9270 NON-COVERED ITEM OR SERVICE: HCPCS | Performed by: HOSPITALIST

## 2022-06-11 PROCEDURE — G0378 HOSPITAL OBSERVATION PER HR: HCPCS

## 2022-06-11 PROCEDURE — 96372 THER/PROPH/DIAG INJ SC/IM: CPT

## 2022-06-11 PROCEDURE — 700102 HCHG RX REV CODE 250 W/ 637 OVERRIDE(OP): Performed by: HOSPITALIST

## 2022-06-11 PROCEDURE — 99224 PR SUBSEQUENT OBSERVATION CARE,LEVEL I: CPT | Performed by: STUDENT IN AN ORGANIZED HEALTH CARE EDUCATION/TRAINING PROGRAM

## 2022-06-11 PROCEDURE — 0240U HCHG SARS-COV-2 COVID-19 NFCT DS RESP RNA 3 TRGT MIC: CPT

## 2022-06-11 PROCEDURE — 82962 GLUCOSE BLOOD TEST: CPT

## 2022-06-11 PROCEDURE — 700111 HCHG RX REV CODE 636 W/ 250 OVERRIDE (IP): Performed by: HOSPITALIST

## 2022-06-11 RX ADMIN — CLOPIDOGREL BISULFATE 75 MG: 75 TABLET ORAL at 05:38

## 2022-06-11 RX ADMIN — AMLODIPINE BESYLATE 10 MG: 5 TABLET ORAL at 05:37

## 2022-06-11 RX ADMIN — ACETAMINOPHEN 650 MG: 325 TABLET ORAL at 20:57

## 2022-06-11 RX ADMIN — LOSARTAN POTASSIUM 100 MG: 50 TABLET, FILM COATED ORAL at 05:38

## 2022-06-11 RX ADMIN — INSULIN HUMAN 6 UNITS: 100 INJECTION, SOLUTION PARENTERAL at 20:57

## 2022-06-11 RX ADMIN — INSULIN HUMAN 6 UNITS: 100 INJECTION, SOLUTION PARENTERAL at 12:44

## 2022-06-11 RX ADMIN — ATORVASTATIN CALCIUM 80 MG: 40 TABLET, FILM COATED ORAL at 17:35

## 2022-06-11 RX ADMIN — ASPIRIN 81 MG: 81 TABLET, COATED ORAL at 05:38

## 2022-06-11 RX ADMIN — CARVEDILOL 25 MG: 25 TABLET, FILM COATED ORAL at 17:35

## 2022-06-11 RX ADMIN — ENOXAPARIN SODIUM 40 MG: 40 INJECTION SUBCUTANEOUS at 17:35

## 2022-06-11 RX ADMIN — INSULIN HUMAN 3 UNITS: 100 INJECTION, SOLUTION PARENTERAL at 17:30

## 2022-06-11 RX ADMIN — INSULIN GLARGINE-YFGN 15 UNITS: 100 INJECTION, SOLUTION SUBCUTANEOUS at 08:51

## 2022-06-11 RX ADMIN — INSULIN HUMAN 3 UNITS: 100 INJECTION, SOLUTION PARENTERAL at 08:52

## 2022-06-11 ASSESSMENT — LIFESTYLE VARIABLES: SUBSTANCE_ABUSE: 0

## 2022-06-11 ASSESSMENT — ENCOUNTER SYMPTOMS
EYE PAIN: 0
FOCAL WEAKNESS: 1
BACK PAIN: 0
SPEECH CHANGE: 0
FEVER: 0
BLURRED VISION: 0
HEADACHES: 0
SHORTNESS OF BREATH: 0
ABDOMINAL PAIN: 0
VOMITING: 0
DIZZINESS: 0
COUGH: 0
SENSORY CHANGE: 0
CHILLS: 0
INSOMNIA: 0
PALPITATIONS: 0
NAUSEA: 0

## 2022-06-11 ASSESSMENT — PAIN DESCRIPTION - PAIN TYPE
TYPE: ACUTE PAIN
TYPE: ACUTE PAIN

## 2022-06-11 NOTE — CARE PLAN
The patient is Stable - Low risk of patient condition declining or worsening    Shift Goals  Clinical Goals: maintain safety, free from falls  Patient Goals: work with therapy    Progress made toward(s) clinical / shift goals:    Problem: Optimal Care of the Stroke Patient  Goal: Optimal emergency care for the stroke patient  Outcome: Progressing  Goal: Optimal acute care for the stroke patient  Outcome: Progressing     Problem: Knowledge Deficit - Stroke Education  Goal: Patient's knowledge of stroke and risk factors will improve  Outcome: Progressing     Problem: Psychosocial - Patient Condition  Goal: Patient's ability to verbalize feelings about condition will improve  Outcome: Progressing       Patient is not progressing towards the following goals:

## 2022-06-11 NOTE — PROGRESS NOTES
Hospital Medicine Daily Progress Note    Date of Service  6/11/2022    Chief Complaint  Reji Madrigal Jr. is a 56 y.o. male admitted 6/3/2022 with stroke.    Hospital Course  Reji Madrigal Jr. is a 56 y.o. male who presented 6/3/2022 with right-sided weakness.  The patient has a history of hypertension diabetes and coronary artery disease was admitted to [facility] after presenting with right-sided weakness and was diagnosed with acute stroke he subsequently left AMA and presented to our emergency department and was emergency department with failed attempt to place him in rehab.  The patient reports that his right-sided weakness is improving.  He denies any headache.  His dysarthria is improved and is tolerating his diet.  No fever or chills.  The patient had a work-up for stroke at the outlying facility with noncontrast MRI revealing 2 small areas of acute infarct in the left corona radiata and previous right thalamic infarction.    Interval Problem Update  No acute events overnight.  Patient is medically cleared for SNF.  COVID test ordered.    I have personally seen and examined the patient at bedside. I discussed the plan of care with patient.    Consultants/Specialty  none    Code Status  Full Code    Disposition  Patient is medically cleared for discharge.   Anticipate discharge to to skilled nursing facility.  I have placed the appropriate orders for post-discharge needs.    Review of Systems  Review of Systems   Constitutional: Negative for chills and fever.   Eyes: Negative for blurred vision and pain.   Respiratory: Negative for cough and shortness of breath.    Cardiovascular: Negative for chest pain, palpitations and leg swelling.   Gastrointestinal: Negative for abdominal pain, nausea and vomiting.   Genitourinary: Negative for dysuria and urgency.   Musculoskeletal: Negative for back pain.   Skin: Negative for itching and rash.   Neurological: Positive for focal weakness.  Negative for dizziness, sensory change, speech change and headaches.   Psychiatric/Behavioral: Negative for substance abuse. The patient does not have insomnia.         Physical Exam  Temp:  [36.1 °C (97 °F)-37 °C (98.6 °F)] 37 °C (98.6 °F)  Pulse:  [58-71] 60  Resp:  [16-20] 18  BP: (144-171)/(75-97) 149/75  SpO2:  [95 %-98 %] 98 %    Physical Exam  Constitutional:       General: He is not in acute distress.     Appearance: He is not ill-appearing.   HENT:      Head: Normocephalic and atraumatic.      Right Ear: External ear normal.      Left Ear: External ear normal.      Mouth/Throat:      Pharynx: No oropharyngeal exudate or posterior oropharyngeal erythema.   Eyes:      Extraocular Movements: Extraocular movements intact.      Pupils: Pupils are equal, round, and reactive to light.   Cardiovascular:      Rate and Rhythm: Normal rate and regular rhythm.      Pulses: Normal pulses.      Heart sounds: Normal heart sounds.   Pulmonary:      Effort: Pulmonary effort is normal. No respiratory distress.      Breath sounds: Normal breath sounds. No wheezing or rales.   Abdominal:      General: Bowel sounds are normal. There is no distension.      Palpations: Abdomen is soft.      Tenderness: There is no abdominal tenderness. There is no guarding or rebound.   Musculoskeletal:         General: No swelling or tenderness.      Cervical back: Normal range of motion and neck supple.      Right lower leg: No edema.      Left lower leg: No edema.   Skin:     General: Skin is warm and dry.   Neurological:      General: No focal deficit present.      Mental Status: He is oriented to person, place, and time.      Cranial Nerves: No cranial nerve deficit.      Sensory: No sensory deficit.      Motor: Weakness present.   Psychiatric:         Mood and Affect: Mood normal.         Behavior: Behavior normal.         Fluids    Intake/Output Summary (Last 24 hours) at 6/11/2022 1322  Last data filed at 6/11/2022 0730  Gross per 24 hour    Intake 840 ml   Output 1500 ml   Net -660 ml       Laboratory                        Imaging  No orders to display        Assessment/Plan  * CVA (cerebral vascular accident) ([facility])  Assessment & Plan  Continue aspirin Plavix  Continue atorvastatin  Blood pressure control  PT OT SLP  Case management to assist with discharge planning    Type 2 diabetes mellitus with hyperglycemia, with long-term current use of insulin ([facility])  Assessment & Plan  Continue with increase Lantus 18 units continue sliding scale insulin monitor CBGs    Primary hypertension  Assessment & Plan  Continue amlodipineand losartan  Increase carvedilol 25 bid   Goal SBP is normotension  Monitor blood pressure and adjust accordingly    Hemiparesis affecting right side as late effect of stroke ([facility])  Assessment & Plan  Continue aspirin and statin  Blood pressure control  PT OT SLP  Discharge planning       VTE prophylaxis: enoxaparin ppx    I have performed a physical exam and reviewed and updated ROS and Plan today (6/11/2022). In review of yesterday's note (6/10/2022), there are no changes except as documented above.

## 2022-06-11 NOTE — CARE PLAN
The patient is Stable - Low risk of patient condition declining or worsening    Shift Goals  Clinical Goals: maintain safety  Patient Goals: discharge plan    Progress made toward(s) clinical / shift goals:    Problem: Optimal Care of the Stroke Patient  Goal: Optimal acute care for the stroke patient  Outcome: Progressing     Problem: Knowledge Deficit - Stroke Education  Goal: Patient's knowledge of stroke and risk factors will improve  Outcome: Progressing     Problem: Psychosocial - Patient Condition  Goal: Patient's ability to verbalize feelings about condition will improve  Outcome: Progressing  Goal: Patient's ability to re-evaluate and adapt role responsibilities will improve  Outcome: Progressing     Problem: Discharge Planning - Stroke  Goal: Ensure Stroke Core Measures are met prior to discharge  Outcome: Progressing  Goal: Patient’s continuum of care needs will be met  Outcome: Progressing     Problem: Neuro Status  Goal: Neuro status will remain stable or improve  Outcome: Progressing     Problem: Hemodynamic Monitoring  Goal: Patient's hemodynamics, fluid balance and neurologic status will be stable or improve  Outcome: Progressing     Problem: Dysphagia  Goal: Dysphagia will improve  Outcome: Progressing     Problem: Risk for Aspiration  Goal: Patient's risk for aspiration will be absent or decrease  Outcome: Progressing     Problem: Urinary Elimination  Goal: Establish and maintain regular urinary output  Outcome: Progressing     Problem: Bowel Elimination  Goal: Establish and maintain regular bowel function  Outcome: Progressing     Problem: Mobility - Stroke  Goal: Patient's capacity to carry out activities will improve  Outcome: Progressing  Goal: Spasticity will be prevented or improved  Outcome: Progressing  Goal: Subluxation will be prevented or improved  Outcome: Progressing     Problem: Self Care  Goal: Patient will have the ability to perform ADLs independently or with assistance (bathe,  groom, dress, toilet and feed)  Outcome: Progressing     Problem: Knowledge Deficit - Standard  Goal: Patient and family/care givers will demonstrate understanding of plan of care, disease process/condition, diagnostic tests and medications  Outcome: Progressing     Problem: Skin Integrity  Goal: Skin integrity is maintained or improved  Outcome: Progressing     Problem: Fall Risk  Goal: Patient will remain free from falls  Outcome: Progressing     Problem: Pain - Standard  Goal: Alleviation of pain or a reduction in pain to the patient’s comfort goal  Outcome: Progressing       Patient is not progressing towards the following goals:

## 2022-06-11 NOTE — PROGRESS NOTES
BGs not transferring to to results tab.     BG this AM was 169.  BG this afternoon was 212.     Investigating how to correct issue now.

## 2022-06-11 NOTE — PROGRESS NOTES
Received bedside report from night shift RN.   Assumed care of patient at change of shift.   Assessment complete and POC discussed.   STATUS: Patient is A&Ox3, VSS, on RA.   PAIN: Patient denies pain, no apparent signs of distress or discomfort.   Patient is sitting up at edge of bed to use urinal.   Bed alarm set, call light in reach.  Bed is in lowest/locked position.   Call light and belongings are within reach.   No further needs at this time.

## 2022-06-12 LAB
FLUAV RNA SPEC QL NAA+PROBE: NEGATIVE
FLUBV RNA SPEC QL NAA+PROBE: NEGATIVE
GLUCOSE BLD STRIP.AUTO-MCNC: 170 MG/DL (ref 65–99)
GLUCOSE BLD STRIP.AUTO-MCNC: 180 MG/DL (ref 65–99)
GLUCOSE BLD STRIP.AUTO-MCNC: 228 MG/DL (ref 65–99)
GLUCOSE BLD STRIP.AUTO-MCNC: 261 MG/DL (ref 65–99)
SARS-COV-2 RNA RESP QL NAA+PROBE: NOTDETECTED
SPECIMEN SOURCE: NORMAL

## 2022-06-12 PROCEDURE — A9270 NON-COVERED ITEM OR SERVICE: HCPCS | Performed by: HOSPITALIST

## 2022-06-12 PROCEDURE — G0378 HOSPITAL OBSERVATION PER HR: HCPCS

## 2022-06-12 PROCEDURE — 82962 GLUCOSE BLOOD TEST: CPT | Mod: 91

## 2022-06-12 PROCEDURE — 700102 HCHG RX REV CODE 250 W/ 637 OVERRIDE(OP): Performed by: HOSPITALIST

## 2022-06-12 PROCEDURE — 700111 HCHG RX REV CODE 636 W/ 250 OVERRIDE (IP): Performed by: HOSPITALIST

## 2022-06-12 PROCEDURE — 99224 PR SUBSEQUENT OBSERVATION CARE,LEVEL I: CPT | Performed by: STUDENT IN AN ORGANIZED HEALTH CARE EDUCATION/TRAINING PROGRAM

## 2022-06-12 PROCEDURE — 96372 THER/PROPH/DIAG INJ SC/IM: CPT

## 2022-06-12 RX ADMIN — INSULIN HUMAN 3 UNITS: 100 INJECTION, SOLUTION PARENTERAL at 08:19

## 2022-06-12 RX ADMIN — CARVEDILOL 25 MG: 25 TABLET, FILM COATED ORAL at 17:41

## 2022-06-12 RX ADMIN — AMLODIPINE BESYLATE 10 MG: 5 TABLET ORAL at 04:54

## 2022-06-12 RX ADMIN — INSULIN HUMAN 9 UNITS: 100 INJECTION, SOLUTION PARENTERAL at 20:40

## 2022-06-12 RX ADMIN — INSULIN HUMAN 3 UNITS: 100 INJECTION, SOLUTION PARENTERAL at 17:48

## 2022-06-12 RX ADMIN — INSULIN HUMAN 6 UNITS: 100 INJECTION, SOLUTION PARENTERAL at 12:25

## 2022-06-12 RX ADMIN — CLOPIDOGREL BISULFATE 75 MG: 75 TABLET ORAL at 04:54

## 2022-06-12 RX ADMIN — ENOXAPARIN SODIUM 40 MG: 40 INJECTION SUBCUTANEOUS at 17:41

## 2022-06-12 RX ADMIN — ATORVASTATIN CALCIUM 80 MG: 40 TABLET, FILM COATED ORAL at 17:41

## 2022-06-12 RX ADMIN — LOSARTAN POTASSIUM 100 MG: 50 TABLET, FILM COATED ORAL at 04:54

## 2022-06-12 RX ADMIN — ASPIRIN 81 MG: 81 TABLET, COATED ORAL at 04:54

## 2022-06-12 RX ADMIN — CARVEDILOL 25 MG: 25 TABLET, FILM COATED ORAL at 08:13

## 2022-06-12 RX ADMIN — INSULIN GLARGINE-YFGN 15 UNITS: 100 INJECTION, SOLUTION SUBCUTANEOUS at 08:19

## 2022-06-12 ASSESSMENT — ENCOUNTER SYMPTOMS
SHORTNESS OF BREATH: 0
VOMITING: 0
FOCAL WEAKNESS: 1
BACK PAIN: 0
COUGH: 0
HEADACHES: 0
INSOMNIA: 0
NAUSEA: 0
DIZZINESS: 0
EYE PAIN: 0
ABDOMINAL PAIN: 0
BLURRED VISION: 0
PALPITATIONS: 0
FEVER: 0
SENSORY CHANGE: 0
CHILLS: 0
SPEECH CHANGE: 0

## 2022-06-12 ASSESSMENT — PATIENT HEALTH QUESTIONNAIRE - PHQ9
1. LITTLE INTEREST OR PLEASURE IN DOING THINGS: NOT AT ALL
SUM OF ALL RESPONSES TO PHQ9 QUESTIONS 1 AND 2: 0

## 2022-06-12 ASSESSMENT — LIFESTYLE VARIABLES: SUBSTANCE_ABUSE: 0

## 2022-06-12 NOTE — CARE PLAN
The patient is Stable - Low risk of patient condition declining or worsening    Shift Goals  Clinical Goals: maintain safety and be from falls/injury      Progress made toward(s) clinical / shift goals:  pt has hx of stroke. Safety precautions in place. Bed in low position, treaded socks on, personal possessions in reach, call light in reach and bed alarm on. Hourly rounding in place.       Problem: Knowledge Deficit - Standard  Goal: Patient and family/care givers will demonstrate understanding of plan of care, disease process/condition, diagnostic tests and medications  Outcome: Progressing     Problem: Skin Integrity  Goal: Skin integrity is maintained or improved  Outcome: Progressing     Problem: Fall Risk  Goal: Patient will remain free from falls  Outcome: Progressing     Problem: Pain - Standard  Goal: Alleviation of pain or a reduction in pain to the patient’s comfort goal  Outcome: Progressing

## 2022-06-12 NOTE — PROGRESS NOTES
Pt alert/oriented x4, medicated for right foot pain. Tolerating RA. Blood glucose=226, 6 units insulin given. Pt resting in bed watching television, needs met. Call light within reach, personal belongings available, bed in lowest position, treaded socks on, and bed alarm on. Hourly rounding in place.

## 2022-06-12 NOTE — PROGRESS NOTES
Received bedside report from night shift RN.   Assumed care of patient at change of shift.   Assessment complete and POC discussed.   STATUS: Patient is A&Ox4, VSS, on RA.   PAIN: Patient denies pain, no apparent signs of distress or discomfort.   Patient is resting in bed until breakfast come.  Bed alarm set, call light in reach.  Bed is in lowest/locked position.   Call light and belongings are within reach.   No further needs at this time.

## 2022-06-12 NOTE — PROGRESS NOTES
Hospital Medicine Daily Progress Note    Date of Service  6/12/2022    Chief Complaint  Reji Madrigal Jr. is a 56 y.o. male admitted 6/3/2022 with stroke.    Hospital Course  Reji Madrigal Jr. is a 56 y.o. male who presented 6/3/2022 with right-sided weakness.  The patient has a history of hypertension diabetes and coronary artery disease was admitted to [facility] after presenting with right-sided weakness and was diagnosed with acute stroke he subsequently left AMA and presented to our emergency department and was emergency department with failed attempt to place him in rehab.  The patient reports that his right-sided weakness is improving.  He denies any headache.  His dysarthria is improved and is tolerating his diet.  No fever or chills.  The patient had a work-up for stroke at the outlying facility with noncontrast MRI revealing 2 small areas of acute infarct in the left corona radiata and previous right thalamic infarction.    Interval Problem Update  No acute events overnight.  Patient is medically cleared for SNF.  COVID test ordered.    I have personally seen and examined the patient at bedside. I discussed the plan of care with patient.    Consultants/Specialty  none    Code Status  Full Code    Disposition  Patient is medically cleared for discharge.   Anticipate discharge to to skilled nursing facility.  I have placed the appropriate orders for post-discharge needs.    Review of Systems  Review of Systems   Constitutional: Negative for chills and fever.   Eyes: Negative for blurred vision and pain.   Respiratory: Negative for cough and shortness of breath.    Cardiovascular: Negative for chest pain, palpitations and leg swelling.   Gastrointestinal: Negative for abdominal pain, nausea and vomiting.   Genitourinary: Negative for dysuria and urgency.   Musculoskeletal: Negative for back pain.   Skin: Negative for itching and rash.   Neurological: Positive for focal weakness.  Negative for dizziness, sensory change, speech change and headaches.   Psychiatric/Behavioral: Negative for substance abuse. The patient does not have insomnia.         Physical Exam  Temp:  [36.4 °C (97.5 °F)-36.8 °C (98.2 °F)] 36.8 °C (98.2 °F)  Pulse:  [54-69] 66  Resp:  [17-18] 18  BP: (134-149)/(75-96) 134/87  SpO2:  [95 %-98 %] 95 %    Physical Exam  Constitutional:       General: He is not in acute distress.     Appearance: He is not ill-appearing.   HENT:      Head: Normocephalic and atraumatic.      Right Ear: External ear normal.      Left Ear: External ear normal.      Mouth/Throat:      Pharynx: No oropharyngeal exudate or posterior oropharyngeal erythema.   Eyes:      Extraocular Movements: Extraocular movements intact.      Pupils: Pupils are equal, round, and reactive to light.   Cardiovascular:      Rate and Rhythm: Normal rate and regular rhythm.      Pulses: Normal pulses.      Heart sounds: Normal heart sounds.   Pulmonary:      Effort: Pulmonary effort is normal. No respiratory distress.      Breath sounds: Normal breath sounds. No wheezing or rales.   Abdominal:      General: Bowel sounds are normal. There is no distension.      Palpations: Abdomen is soft.      Tenderness: There is no abdominal tenderness. There is no guarding or rebound.   Musculoskeletal:         General: No swelling or tenderness.      Cervical back: Normal range of motion and neck supple.      Right lower leg: No edema.      Left lower leg: No edema.   Skin:     General: Skin is warm and dry.   Neurological:      General: No focal deficit present.      Mental Status: He is oriented to person, place, and time.      Cranial Nerves: No cranial nerve deficit.      Sensory: No sensory deficit.      Motor: Weakness present.   Psychiatric:         Mood and Affect: Mood normal.         Behavior: Behavior normal.         Fluids    Intake/Output Summary (Last 24 hours) at 6/12/2022 1116  Last data filed at 6/12/2022 1020  Gross per  24 hour   Intake 954 ml   Output 1250 ml   Net -296 ml       Laboratory                        Imaging  No orders to display        Assessment/Plan  * CVA (cerebral vascular accident) ([facility])  Assessment & Plan  Continue aspirin Plavix  Continue atorvastatin  Blood pressure control  PT OT SLP  Case management to assist with discharge planning    Type 2 diabetes mellitus with hyperglycemia, with long-term current use of insulin ([facility])  Assessment & Plan  Continue with increase Lantus 18 units continue sliding scale insulin monitor CBGs    Primary hypertension  Assessment & Plan  Continue amlodipineand losartan  Increase carvedilol 25 bid   Goal SBP is normotension  Monitor blood pressure and adjust accordingly    Hemiparesis affecting right side as late effect of stroke ([facility])  Assessment & Plan  Continue aspirin and statin  Blood pressure control  PT OT SLP  Discharge planning       VTE prophylaxis: enoxaparin ppx    I have performed a physical exam and reviewed and updated ROS and Plan today (6/12/2022). In review of yesterday's note (6/11/2022), there are no changes except as documented above.

## 2022-06-13 LAB
GLUCOSE BLD STRIP.AUTO-MCNC: 149 MG/DL (ref 65–99)
GLUCOSE BLD STRIP.AUTO-MCNC: 166 MG/DL (ref 65–99)
GLUCOSE BLD STRIP.AUTO-MCNC: 174 MG/DL (ref 65–99)
GLUCOSE BLD STRIP.AUTO-MCNC: 235 MG/DL (ref 65–99)

## 2022-06-13 PROCEDURE — G0378 HOSPITAL OBSERVATION PER HR: HCPCS

## 2022-06-13 PROCEDURE — 82962 GLUCOSE BLOOD TEST: CPT | Mod: 91

## 2022-06-13 PROCEDURE — A9270 NON-COVERED ITEM OR SERVICE: HCPCS | Performed by: HOSPITALIST

## 2022-06-13 PROCEDURE — 96372 THER/PROPH/DIAG INJ SC/IM: CPT

## 2022-06-13 PROCEDURE — 700102 HCHG RX REV CODE 250 W/ 637 OVERRIDE(OP): Performed by: HOSPITALIST

## 2022-06-13 PROCEDURE — 99224 PR SUBSEQUENT OBSERVATION CARE,LEVEL I: CPT | Performed by: STUDENT IN AN ORGANIZED HEALTH CARE EDUCATION/TRAINING PROGRAM

## 2022-06-13 RX ADMIN — CLOPIDOGREL BISULFATE 75 MG: 75 TABLET ORAL at 05:18

## 2022-06-13 RX ADMIN — DOCUSATE SODIUM 100 MG: 100 CAPSULE, LIQUID FILLED ORAL at 05:18

## 2022-06-13 RX ADMIN — ASPIRIN 81 MG: 81 TABLET, COATED ORAL at 05:18

## 2022-06-13 RX ADMIN — DOCUSATE SODIUM 50 MG AND SENNOSIDES 8.6 MG 2 TABLET: 8.6; 5 TABLET, FILM COATED ORAL at 05:18

## 2022-06-13 RX ADMIN — CARVEDILOL 25 MG: 25 TABLET, FILM COATED ORAL at 17:33

## 2022-06-13 RX ADMIN — AMLODIPINE BESYLATE 10 MG: 5 TABLET ORAL at 05:18

## 2022-06-13 RX ADMIN — INSULIN HUMAN 3 UNITS: 100 INJECTION, SOLUTION PARENTERAL at 17:31

## 2022-06-13 RX ADMIN — ATORVASTATIN CALCIUM 80 MG: 40 TABLET, FILM COATED ORAL at 17:33

## 2022-06-13 RX ADMIN — INSULIN HUMAN 3 UNITS: 100 INJECTION, SOLUTION PARENTERAL at 08:44

## 2022-06-13 RX ADMIN — LOSARTAN POTASSIUM 100 MG: 50 TABLET, FILM COATED ORAL at 05:18

## 2022-06-13 RX ADMIN — INSULIN GLARGINE-YFGN 15 UNITS: 100 INJECTION, SOLUTION SUBCUTANEOUS at 08:44

## 2022-06-13 RX ADMIN — INSULIN HUMAN 6 UNITS: 100 INJECTION, SOLUTION PARENTERAL at 20:18

## 2022-06-13 ASSESSMENT — ENCOUNTER SYMPTOMS
ABDOMINAL PAIN: 0
BACK PAIN: 0
FOCAL WEAKNESS: 1
CHILLS: 0
DIZZINESS: 0
VOMITING: 0
PALPITATIONS: 0
HEADACHES: 0
INSOMNIA: 0
EYE PAIN: 0
BLURRED VISION: 0
NAUSEA: 0
SHORTNESS OF BREATH: 0
FEVER: 0
COUGH: 0
SENSORY CHANGE: 0
SPEECH CHANGE: 0

## 2022-06-13 ASSESSMENT — LIFESTYLE VARIABLES: SUBSTANCE_ABUSE: 0

## 2022-06-13 NOTE — CARE PLAN
The patient is Stable - Low risk of patient condition declining or worsening    Shift Goals  Clinical Goals: maintain safety  Patient Goals: rest    Progress made toward(s) clinical / shift goals:  yes      Problem: Optimal Care of the Stroke Patient  Goal: Optimal emergency care for the stroke patient  Outcome: Progressing  Goal: Optimal acute care for the stroke patient  Outcome: Progressing     Problem: Knowledge Deficit - Stroke Education  Goal: Patient's knowledge of stroke and risk factors will improve  Outcome: Progressing     Problem: Psychosocial - Patient Condition  Goal: Patient's ability to verbalize feelings about condition will improve  Outcome: Progressing  Goal: Patient's ability to re-evaluate and adapt role responsibilities will improve  Outcome: Progressing     Problem: Discharge Planning - Stroke  Goal: Ensure Stroke Core Measures are met prior to discharge  Outcome: Progressing  Goal: Patient’s continuum of care needs will be met  Outcome: Progressing     Problem: Neuro Status  Goal: Neuro status will remain stable or improve  Outcome: Progressing     Problem: Hemodynamic Monitoring  Goal: Patient's hemodynamics, fluid balance and neurologic status will be stable or improve  Outcome: Progressing     Problem: Respiratory - Stroke Patient  Goal: Patient will achieve/maintain optimum respiratory rate/effort  Outcome: Progressing     Problem: Dysphagia  Goal: Dysphagia will improve  Outcome: Progressing     Problem: Risk for Aspiration  Goal: Patient's risk for aspiration will be absent or decrease  Outcome: Progressing     Problem: Urinary Elimination  Goal: Establish and maintain regular urinary output  Outcome: Progressing     Problem: Bowel Elimination  Goal: Establish and maintain regular bowel function  Outcome: Progressing     Problem: Mobility - Stroke  Goal: Patient's capacity to carry out activities will improve  Outcome: Progressing  Goal: Spasticity will be prevented or  improved  Outcome: Progressing  Goal: Subluxation will be prevented or improved  Outcome: Progressing     Problem: Self Care  Goal: Patient will have the ability to perform ADLs independently or with assistance (bathe, groom, dress, toilet and feed)  Outcome: Progressing     Problem: Knowledge Deficit - Standard  Goal: Patient and family/care givers will demonstrate understanding of plan of care, disease process/condition, diagnostic tests and medications  Outcome: Progressing     Problem: Skin Integrity  Goal: Skin integrity is maintained or improved  Outcome: Progressing     Problem: Fall Risk  Goal: Patient will remain free from falls  Outcome: Progressing     Problem: Pain - Standard  Goal: Alleviation of pain or a reduction in pain to the patient’s comfort goal  Outcome: Progressing       Patient is not progressing towards the following goals:

## 2022-06-13 NOTE — PROGRESS NOTES
[facility] Medicine Daily Progress Note    Date of Service  6/13/2022    Chief Complaint  Reji Madrigal Jr. is a 56 y.o. male admitted 6/3/2022 with stroke.    Hospital Course  Reji Madrigal Jr. is a 56 y.o. male who presented 6/3/2022 with right-sided weakness.  The patient has a history of hypertension diabetes and coronary artery disease was admitted to [facility] after presenting with right-sided weakness and was diagnosed with acute stroke he subsequently left A as he was frustrated with waiting for rehab placement, and presented to our emergency department.  The patient reports that his right-sided weakness is improving.  He denies any headache.  His dysarthria is improved and is tolerating his diet.  No fever or chills.  The patient had a work-up for stroke at the outlying facility with noncontrast MRI revealing 2 small areas of acute infarct in the left corona radiata and previous right thalamic infarction. Patient is neurologically stable, pending SNF placement.    Interval Problem Update  No acute events overnight.  Patient is medically cleared for SNF.  SNF pending acceptance. Patient to obtain housing until end of August then SNF may be willing to accept him.    I have personally seen and examined the patient at bedside. I discussed the plan of care with patient.    Consultants/Specialty  none    Code Status  Full Code    Disposition  Patient is medically cleared for discharge.   Anticipate discharge to to skilled nursing facility.  I have placed the appropriate orders for post-discharge needs.    Review of Systems  Review of Systems   Constitutional: Negative for chills and fever.   Eyes: Negative for blurred vision and pain.   Respiratory: Negative for cough and shortness of breath.    Cardiovascular: Negative for chest pain, palpitations and leg swelling.   Gastrointestinal: Negative for abdominal pain, nausea and vomiting.   Genitourinary: Negative for dysuria and  urgency.   Musculoskeletal: Negative for back pain.   Skin: Negative for itching and rash.   Neurological: Positive for focal weakness. Negative for dizziness, sensory change, speech change and headaches.   Psychiatric/Behavioral: Negative for substance abuse. The patient does not have insomnia.         Physical Exam  Temp:  [36.3 °C (97.3 °F)-36.8 °C (98.2 °F)] 36.5 °C (97.7 °F)  Pulse:  [55-72] 58  Resp:  [18] 18  BP: (128-133)/(75-95) 128/78  SpO2:  [93 %-98 %] 93 %    Physical Exam  Constitutional:       General: He is not in acute distress.     Appearance: He is not ill-appearing.   HENT:      Head: Normocephalic and atraumatic.      Right Ear: External ear normal.      Left Ear: External ear normal.      Mouth/Throat:      Pharynx: No oropharyngeal exudate or posterior oropharyngeal erythema.   Eyes:      Extraocular Movements: Extraocular movements intact.      Pupils: Pupils are equal, round, and reactive to light.   Cardiovascular:      Rate and Rhythm: Normal rate and regular rhythm.      Pulses: Normal pulses.      Heart sounds: Normal heart sounds.   Pulmonary:      Effort: Pulmonary effort is normal. No respiratory distress.      Breath sounds: Normal breath sounds. No wheezing or rales.   Abdominal:      General: Bowel sounds are normal. There is no distension.      Palpations: Abdomen is soft.      Tenderness: There is no abdominal tenderness. There is no guarding or rebound.   Musculoskeletal:         General: No swelling or tenderness.      Cervical back: Normal range of motion and neck supple.      Right lower leg: No edema.      Left lower leg: No edema.   Skin:     General: Skin is warm and dry.   Neurological:      General: No focal deficit present.      Mental Status: He is oriented to person, place, and time.      Cranial Nerves: No cranial nerve deficit.      Sensory: No sensory deficit.      Motor: Weakness present.   Psychiatric:         Mood and Affect: Mood normal.         Behavior:  Behavior normal.         Fluids    Intake/Output Summary (Last 24 hours) at 6/13/2022 1402  Last data filed at 6/13/2022 1200  Gross per 24 hour   Intake 800 ml   Output 1400 ml   Net -600 ml       Laboratory                        Imaging  No orders to display        Assessment/Plan  * CVA (cerebral vascular accident) (H[facility])  Assessment & Plan  Continue aspirin Plavix  Continue atorvastatin  Blood pressure control  PT OT SLP  Case management to assist with discharge planning    Type 2 diabetes mellitus with hyperglycemia, with long-term current use of insulin ([facility])  Assessment & Plan  Continue with increase Lantus 18 units continue sliding scale insulin monitor CBGs    Primary hypertension  Assessment & Plan  Continue amlodipineand losartan  Increase carvedilol 25 bid   Goal SBP is normotension  Monitor blood pressure and adjust accordingly    Hemiparesis affecting right side as late effect of stroke (HCC)  Assessment & Plan  Continue aspirin and statin  Blood pressure control  PT OT SLP  Discharge planning       VTE prophylaxis: enoxaparin ppx    I have performed a physical exam and reviewed and updated ROS and Plan today (6/13/2022). In review of yesterday's note (6/12/2022), there are no changes except as documented above.

## 2022-06-14 LAB
GLUCOSE BLD STRIP.AUTO-MCNC: 160 MG/DL (ref 65–99)
GLUCOSE BLD STRIP.AUTO-MCNC: 172 MG/DL (ref 65–99)
GLUCOSE BLD STRIP.AUTO-MCNC: 174 MG/DL (ref 65–99)
GLUCOSE BLD STRIP.AUTO-MCNC: 176 MG/DL (ref 65–99)

## 2022-06-14 PROCEDURE — 99225 PR SUBSEQUENT OBSERVATION CARE,LEVEL II: CPT | Performed by: STUDENT IN AN ORGANIZED HEALTH CARE EDUCATION/TRAINING PROGRAM

## 2022-06-14 PROCEDURE — 700111 HCHG RX REV CODE 636 W/ 250 OVERRIDE (IP): Performed by: HOSPITALIST

## 2022-06-14 PROCEDURE — 700102 HCHG RX REV CODE 250 W/ 637 OVERRIDE(OP): Performed by: HOSPITALIST

## 2022-06-14 PROCEDURE — A9270 NON-COVERED ITEM OR SERVICE: HCPCS | Performed by: HOSPITALIST

## 2022-06-14 PROCEDURE — G0378 HOSPITAL OBSERVATION PER HR: HCPCS

## 2022-06-14 PROCEDURE — 82962 GLUCOSE BLOOD TEST: CPT | Mod: 91

## 2022-06-14 PROCEDURE — 96372 THER/PROPH/DIAG INJ SC/IM: CPT

## 2022-06-14 RX ADMIN — INSULIN HUMAN 3 UNITS: 100 INJECTION, SOLUTION PARENTERAL at 08:29

## 2022-06-14 RX ADMIN — INSULIN HUMAN 3 UNITS: 100 INJECTION, SOLUTION PARENTERAL at 21:11

## 2022-06-14 RX ADMIN — CLOPIDOGREL BISULFATE 75 MG: 75 TABLET ORAL at 05:22

## 2022-06-14 RX ADMIN — ATORVASTATIN CALCIUM 80 MG: 40 TABLET, FILM COATED ORAL at 17:57

## 2022-06-14 RX ADMIN — CARVEDILOL 25 MG: 25 TABLET, FILM COATED ORAL at 08:28

## 2022-06-14 RX ADMIN — DOCUSATE SODIUM 50 MG AND SENNOSIDES 8.6 MG 2 TABLET: 8.6; 5 TABLET, FILM COATED ORAL at 17:57

## 2022-06-14 RX ADMIN — AMLODIPINE BESYLATE 10 MG: 5 TABLET ORAL at 05:22

## 2022-06-14 RX ADMIN — INSULIN HUMAN 3 UNITS: 100 INJECTION, SOLUTION PARENTERAL at 13:43

## 2022-06-14 RX ADMIN — CARVEDILOL 25 MG: 25 TABLET, FILM COATED ORAL at 17:57

## 2022-06-14 RX ADMIN — INSULIN GLARGINE-YFGN 15 UNITS: 100 INJECTION, SOLUTION SUBCUTANEOUS at 08:29

## 2022-06-14 RX ADMIN — DOCUSATE SODIUM 100 MG: 100 CAPSULE, LIQUID FILLED ORAL at 17:57

## 2022-06-14 RX ADMIN — ASPIRIN 81 MG: 81 TABLET, COATED ORAL at 05:21

## 2022-06-14 RX ADMIN — DOCUSATE SODIUM 100 MG: 100 CAPSULE, LIQUID FILLED ORAL at 05:22

## 2022-06-14 RX ADMIN — LOSARTAN POTASSIUM 100 MG: 50 TABLET, FILM COATED ORAL at 05:21

## 2022-06-14 RX ADMIN — INSULIN HUMAN 3 UNITS: 100 INJECTION, SOLUTION PARENTERAL at 18:04

## 2022-06-14 RX ADMIN — ENOXAPARIN SODIUM 40 MG: 40 INJECTION SUBCUTANEOUS at 17:57

## 2022-06-14 ASSESSMENT — ENCOUNTER SYMPTOMS
VOMITING: 0
NAUSEA: 0
SPEECH CHANGE: 0
BLURRED VISION: 0
INSOMNIA: 0
DIZZINESS: 0
EYE PAIN: 0
COUGH: 0
HEADACHES: 0
BACK PAIN: 0
PALPITATIONS: 0
FEVER: 0
ABDOMINAL PAIN: 0
CHILLS: 0
SHORTNESS OF BREATH: 0
SENSORY CHANGE: 0
FOCAL WEAKNESS: 1

## 2022-06-14 ASSESSMENT — PAIN DESCRIPTION - PAIN TYPE
TYPE: ACUTE PAIN
TYPE: ACUTE PAIN

## 2022-06-14 ASSESSMENT — LIFESTYLE VARIABLES: SUBSTANCE_ABUSE: 0

## 2022-06-14 NOTE — CARE PLAN
The patient is Stable - Low risk of patient condition declining or worsening    Shift Goals  Clinical Goals: Maintain safety  Patient Goals: Rest    Progress made toward(s) clinical / shift goals:  Yes    Problem: Optimal Care of the Stroke Patient  Goal: Optimal emergency care for the stroke patient  Outcome: Progressing  Goal: Optimal acute care for the stroke patient  Outcome: Progressing     Problem: Knowledge Deficit - Stroke Education  Goal: Patient's knowledge of stroke and risk factors will improve  Outcome: Progressing     Problem: Psychosocial - Patient Condition  Goal: Patient's ability to verbalize feelings about condition will improve  Outcome: Progressing  Goal: Patient's ability to re-evaluate and adapt role responsibilities will improve  Outcome: Progressing     Problem: Discharge Planning - Stroke  Goal: Ensure Stroke Core Measures are met prior to discharge  Outcome: Progressing  Goal: Patient’s continuum of care needs will be met  Outcome: Progressing     Problem: Neuro Status  Goal: Neuro status will remain stable or improve  Outcome: Progressing     Problem: Hemodynamic Monitoring  Goal: Patient's hemodynamics, fluid balance and neurologic status will be stable or improve  Outcome: Progressing     Problem: Respiratory - Stroke Patient  Goal: Patient will achieve/maintain optimum respiratory rate/effort  Outcome: Progressing     Problem: Dysphagia  Goal: Dysphagia will improve  Outcome: Progressing     Problem: Risk for Aspiration  Goal: Patient's risk for aspiration will be absent or decrease  Outcome: Progressing     Problem: Urinary Elimination  Goal: Establish and maintain regular urinary output  Outcome: Progressing     Problem: Bowel Elimination  Goal: Establish and maintain regular bowel function  Outcome: Progressing     Problem: Mobility - Stroke  Goal: Patient's capacity to carry out activities will improve  Outcome: Progressing  Goal: Spasticity will be prevented or improved  Outcome:  Progressing  Goal: Subluxation will be prevented or improved  Outcome: Progressing     Problem: Self Care  Goal: Patient will have the ability to perform ADLs independently or with assistance (bathe, groom, dress, toilet and feed)  Outcome: Progressing     Problem: Knowledge Deficit - Standard  Goal: Patient and family/care givers will demonstrate understanding of plan of care, disease process/condition, diagnostic tests and medications  Outcome: Progressing     Problem: Skin Integrity  Goal: Skin integrity is maintained or improved  Outcome: Progressing     Problem: Fall Risk  Goal: Patient will remain free from falls  Outcome: Progressing     Problem: Pain - Standard  Goal: Alleviation of pain or a reduction in pain to the patient’s comfort goal  Outcome: Progressing

## 2022-06-14 NOTE — DISCHARGE PLANNING
Anticipated Discharge Disposition: rehab    Action: Rev’d dc plans with pt at bedside. He has secured a place to dc to after he completes his rehab. He has a friend in [place] who is willing to accept him and support him after he completes rehab for the stroke. Pt to provide CM with friend’s address today.     [facility] and [facility] may reconsider now that he has a dc plan and dc support. We will ask both to re-assess.     Rev’d with charge RENAY Steward she will text PT/OT to place updated therapy notes.     Voalte text to Ara/[facility] to re-assess. Once updated notes in for PT/OT-will request [facility] review notes.     Barriers to Discharge: pending [facility] and [facility] re-review.     Plan: pending updated PT/OT and re-asses by rehab and [facility].

## 2022-06-14 NOTE — CARE PLAN
The patient is Stable - Low risk of patient condition declining or worsening    Shift Goals  Clinical Goals: achs controlled, fall prevention  Patient Goals: ambulate with assistance    Progress made toward(s) clinical / shift goals:  Blood sugars has been >200 before meals. Patient uses call light for assistance. Bed alarms in place. Will continue to monitor.     Problem: Knowledge Deficit - Stroke Education  Goal: Patient's knowledge of stroke and risk factors will improve  Outcome: Progressing     Problem: Psychosocial - Patient Condition  Goal: Patient's ability to verbalize feelings about condition will improve  Outcome: Progressing  Goal: Patient's ability to re-evaluate and adapt role responsibilities will improve  Outcome: Progressing

## 2022-06-14 NOTE — PROGRESS NOTES
Received bedside shift report regarding patient and assumed care. Patient is awake, calm and stable, currently positioned in bed for comfort and safety; call light within reach. Denies any pain or discomfort at this time. Bed alarms in place. Will continue to monitor.

## 2022-06-14 NOTE — PROGRESS NOTES
[facility] Medicine Daily Progress Note    Date of Service  6/14/2022    Chief Complaint  Reji Madrigal Jr. is a 56 y.o. male admitted 6/3/2022 with stroke.    Hospital Course  Reji Madrigal Jr. is a 56 y.o. male who presented 6/3/2022 with right-sided weakness.  The patient has a history of hypertension diabetes and coronary artery disease was admitted to [facility] after presenting with right-sided weakness and was diagnosed with acute stroke he subsequently left A as he was frustrated with waiting for rehab placement, and presented to our emergency department.  The patient reports that his right-sided weakness is improving.  He denies any headache.  His dysarthria is improved and is tolerating his diet.  No fever or chills.  The patient had a work-up for stroke at the outlying facility with noncontrast MRI revealing 2 small areas of acute infarct in the left corona radiata and previous right thalamic infarction. Patient is neurologically stable, pending SNF placement.    Interval Problem Update  No acute events overnight  Patient is medically cleared for SNF.  SNF pending acceptance. Patient to obtain housing until end of August then SNF may be willing to accept him.    I have personally seen and examined the patient at bedside. I discussed the plan of care with patient.    Consultants/Specialty  none    Code Status  Full Code    Disposition  Patient is medically cleared for discharge.   Anticipate discharge to to skilled nursing facility.  I have placed the appropriate orders for post-discharge needs.    Review of Systems  Review of Systems   Constitutional: Negative for chills and fever.   Eyes: Negative for blurred vision and pain.   Respiratory: Negative for cough and shortness of breath.    Cardiovascular: Negative for chest pain, palpitations and leg swelling.   Gastrointestinal: Negative for abdominal pain, nausea and vomiting.   Genitourinary: Negative for dysuria and  urgency.   Musculoskeletal: Negative for back pain.   Skin: Negative for itching and rash.   Neurological: Positive for focal weakness. Negative for dizziness, sensory change, speech change and headaches.   Psychiatric/Behavioral: Negative for substance abuse. The patient does not have insomnia.         Physical Exam  Temp:  [36.6 °C (97.8 °F)-36.8 °C (98.2 °F)] 36.8 °C (98.2 °F)  Pulse:  [64-74] 72  Resp:  [18] 18  BP: (133-158)/(65-94) 135/73  SpO2:  [94 %-97 %] 97 %    Physical Exam  Constitutional:       General: He is not in acute distress.     Appearance: He is not ill-appearing.   HENT:      Head: Normocephalic and atraumatic.      Right Ear: External ear normal.      Left Ear: External ear normal.      Mouth/Throat:      Pharynx: No oropharyngeal exudate or posterior oropharyngeal erythema.   Eyes:      Extraocular Movements: Extraocular movements intact.      Pupils: Pupils are equal, round, and reactive to light.   Cardiovascular:      Rate and Rhythm: Normal rate and regular rhythm.      Pulses: Normal pulses.      Heart sounds: Normal heart sounds.   Pulmonary:      Effort: Pulmonary effort is normal. No respiratory distress.      Breath sounds: Normal breath sounds. No wheezing or rales.   Abdominal:      General: Bowel sounds are normal. There is no distension.      Palpations: Abdomen is soft.      Tenderness: There is no abdominal tenderness. There is no guarding or rebound.   Musculoskeletal:         General: No swelling or tenderness.      Cervical back: Normal range of motion and neck supple.      Right lower leg: No edema.      Left lower leg: No edema.   Skin:     General: Skin is warm and dry.   Neurological:      General: No focal deficit present.      Mental Status: He is oriented to person, place, and time.      Cranial Nerves: No cranial nerve deficit.      Sensory: No sensory deficit.      Motor: Weakness present.   Psychiatric:         Mood and Affect: Mood normal.         Behavior:  Behavior normal.         Fluids    Intake/Output Summary (Last 24 hours) at 6/14/2022 0913  Last data filed at 6/14/2022 0750  Gross per 24 hour   Intake 560 ml   Output 2100 ml   Net -1540 ml       Laboratory                        Imaging  No orders to display        Assessment/Plan  * CVA (cerebral vascular accident) (HCC)  Assessment & Plan  Continue aspirin Plavix  Continue atorvastatin  Blood pressure control  PT OT SLP  Case management to assist with discharge planning    Type 2 diabetes mellitus with hyperglycemia, with long-term current use of insulin ([facility])  Assessment & Plan  Continue with increase Lantus 18 units continue sliding scale insulin monitor CBGs    Primary hypertension  Assessment & Plan  Continue amlodipineand losartan  Increase carvedilol 25 bid   Goal SBP is normotension  Monitor blood pressure and adjust accordingly    Hemiparesis affecting right side as late effect of stroke (HCC)  Assessment & Plan  Continue aspirin and statin  Blood pressure control  PT OT SLP  Discharge planning       VTE prophylaxis: enoxaparin ppx    I have performed a physical exam and reviewed and updated ROS and Plan today (6/14/2022). In review of yesterday's note (6/13/2022), there are no changes except as documented above.

## 2022-06-15 LAB
GLUCOSE BLD STRIP.AUTO-MCNC: 162 MG/DL (ref 65–99)
GLUCOSE BLD STRIP.AUTO-MCNC: 189 MG/DL (ref 65–99)
GLUCOSE BLD STRIP.AUTO-MCNC: 221 MG/DL (ref 65–99)

## 2022-06-15 PROCEDURE — G0378 HOSPITAL OBSERVATION PER HR: HCPCS

## 2022-06-15 PROCEDURE — 700102 HCHG RX REV CODE 250 W/ 637 OVERRIDE(OP): Performed by: EMERGENCY MEDICINE

## 2022-06-15 PROCEDURE — 96372 THER/PROPH/DIAG INJ SC/IM: CPT

## 2022-06-15 PROCEDURE — 97110 THERAPEUTIC EXERCISES: CPT

## 2022-06-15 PROCEDURE — 97535 SELF CARE MNGMENT TRAINING: CPT

## 2022-06-15 PROCEDURE — 700102 HCHG RX REV CODE 250 W/ 637 OVERRIDE(OP): Performed by: HOSPITALIST

## 2022-06-15 PROCEDURE — A9270 NON-COVERED ITEM OR SERVICE: HCPCS | Performed by: HOSPITALIST

## 2022-06-15 PROCEDURE — 99224 PR SUBSEQUENT OBSERVATION CARE,LEVEL I: CPT | Performed by: STUDENT IN AN ORGANIZED HEALTH CARE EDUCATION/TRAINING PROGRAM

## 2022-06-15 PROCEDURE — 82962 GLUCOSE BLOOD TEST: CPT | Mod: 91

## 2022-06-15 PROCEDURE — 97112 NEUROMUSCULAR REEDUCATION: CPT

## 2022-06-15 RX ADMIN — ATORVASTATIN CALCIUM 80 MG: 40 TABLET, FILM COATED ORAL at 17:03

## 2022-06-15 RX ADMIN — ASPIRIN 81 MG: 81 TABLET, COATED ORAL at 04:49

## 2022-06-15 RX ADMIN — CARVEDILOL 25 MG: 25 TABLET, FILM COATED ORAL at 17:03

## 2022-06-15 RX ADMIN — INSULIN HUMAN 3 UNITS: 100 INJECTION, SOLUTION PARENTERAL at 08:35

## 2022-06-15 RX ADMIN — DOCUSATE SODIUM 50 MG AND SENNOSIDES 8.6 MG 2 TABLET: 8.6; 5 TABLET, FILM COATED ORAL at 04:50

## 2022-06-15 RX ADMIN — AMLODIPINE BESYLATE 10 MG: 5 TABLET ORAL at 04:50

## 2022-06-15 RX ADMIN — LOSARTAN POTASSIUM 100 MG: 50 TABLET, FILM COATED ORAL at 04:49

## 2022-06-15 RX ADMIN — CARVEDILOL 25 MG: 25 TABLET, FILM COATED ORAL at 07:59

## 2022-06-15 RX ADMIN — CLOPIDOGREL BISULFATE 75 MG: 75 TABLET ORAL at 04:50

## 2022-06-15 RX ADMIN — INSULIN HUMAN 6 UNITS: 100 INJECTION, SOLUTION PARENTERAL at 21:03

## 2022-06-15 RX ADMIN — DOCUSATE SODIUM 100 MG: 100 CAPSULE, LIQUID FILLED ORAL at 04:50

## 2022-06-15 RX ADMIN — ACETAMINOPHEN 650 MG: 325 TABLET ORAL at 21:03

## 2022-06-15 RX ADMIN — INSULIN GLARGINE-YFGN 15 UNITS: 100 INJECTION, SOLUTION SUBCUTANEOUS at 08:35

## 2022-06-15 ASSESSMENT — COGNITIVE AND FUNCTIONAL STATUS - GENERAL
DAILY ACTIVITIY SCORE: 18
SUGGESTED CMS G CODE MODIFIER MOBILITY: CK
CLIMB 3 TO 5 STEPS WITH RAILING: A LOT
MOVING FROM LYING ON BACK TO SITTING ON SIDE OF FLAT BED: A LITTLE
WALKING IN HOSPITAL ROOM: A LITTLE
EATING MEALS: A LITTLE
MOVING TO AND FROM BED TO CHAIR: A LITTLE
DRESSING REGULAR UPPER BODY CLOTHING: A LITTLE
HELP NEEDED FOR BATHING: A LITTLE
SUGGESTED CMS G CODE MODIFIER DAILY ACTIVITY: CK
TOILETING: A LITTLE
DRESSING REGULAR LOWER BODY CLOTHING: A LITTLE
MOBILITY SCORE: 18
STANDING UP FROM CHAIR USING ARMS: A LITTLE
PERSONAL GROOMING: A LITTLE

## 2022-06-15 ASSESSMENT — ENCOUNTER SYMPTOMS
INSOMNIA: 0
SPEECH CHANGE: 0
BLURRED VISION: 0
SENSORY CHANGE: 0
VOMITING: 0
BACK PAIN: 0
PALPITATIONS: 0
CHILLS: 0
ABDOMINAL PAIN: 0
DIZZINESS: 0
COUGH: 0
NAUSEA: 0
FOCAL WEAKNESS: 1
FEVER: 0
EYE PAIN: 0
SHORTNESS OF BREATH: 0
HEADACHES: 0

## 2022-06-15 ASSESSMENT — PAIN DESCRIPTION - PAIN TYPE: TYPE: ACUTE PAIN

## 2022-06-15 ASSESSMENT — FIBROSIS 4 INDEX: FIB4 SCORE: 0.94

## 2022-06-15 ASSESSMENT — GAIT ASSESSMENTS
ASSISTIVE DEVICE: FRONT WHEEL WALKER
GAIT LEVEL OF ASSIST: CONTACT GUARD ASSIST
DISTANCE (FEET): 250

## 2022-06-15 ASSESSMENT — LIFESTYLE VARIABLES: SUBSTANCE_ABUSE: 0

## 2022-06-15 NOTE — DISCHARGE PLANNING
Per cm patient plans to move in with girlfriend Monik Newman [phone] in [place] at discharge. Contacted Monik and left message.

## 2022-06-15 NOTE — CARE PLAN
The patient is Stable - Low risk of patient condition declining or worsening    Shift Goals  Clinical Goals: pt safety      Progress made toward(s) clinical / shift goals:  Pt is a moderate fall risk pt, fall precautions in place.      Problem: Skin Integrity  Goal: Skin integrity is maintained or improved  Outcome: Progressing

## 2022-06-15 NOTE — CARE PLAN
The patient is Stable - Low risk of patient condition declining or worsening    Shift Goals  Clinical Goals: pt safety  Patient Goals: ambulate with assistance    Progress made toward(s) clinical / shift goals:  yes      Problem: Optimal Care of the Stroke Patient  Goal: Optimal emergency care for the stroke patient  Outcome: Progressing  Goal: Optimal acute care for the stroke patient  Outcome: Progressing     Problem: Knowledge Deficit - Stroke Education  Goal: Patient's knowledge of stroke and risk factors will improve  Outcome: Progressing     Problem: Psychosocial - Patient Condition  Goal: Patient's ability to verbalize feelings about condition will improve  Outcome: Progressing  Goal: Patient's ability to re-evaluate and adapt role responsibilities will improve  Outcome: Progressing     Problem: Discharge Planning - Stroke  Goal: Ensure Stroke Core Measures are met prior to discharge  Outcome: Progressing  Goal: Patient’s continuum of care needs will be met  Outcome: Progressing     Problem: Neuro Status  Goal: Neuro status will remain stable or improve  Outcome: Progressing     Problem: Hemodynamic Monitoring  Goal: Patient's hemodynamics, fluid balance and neurologic status will be stable or improve  Outcome: Progressing     Problem: Respiratory - Stroke Patient  Goal: Patient will achieve/maintain optimum respiratory rate/effort  Outcome: Progressing     Problem: Dysphagia  Goal: Dysphagia will improve  Outcome: Progressing     Problem: Risk for Aspiration  Goal: Patient's risk for aspiration will be absent or decrease  Outcome: Progressing     Problem: Urinary Elimination  Goal: Establish and maintain regular urinary output  Outcome: Progressing     Problem: Bowel Elimination  Goal: Establish and maintain regular bowel function  Outcome: Progressing     Problem: Mobility - Stroke  Goal: Patient's capacity to carry out activities will improve  Outcome: Progressing  Goal: Spasticity will be prevented or  improved  Outcome: Progressing  Goal: Subluxation will be prevented or improved  Outcome: Progressing     Problem: Self Care  Goal: Patient will have the ability to perform ADLs independently or with assistance (bathe, groom, dress, toilet and feed)  Outcome: Progressing     Problem: Knowledge Deficit - Standard  Goal: Patient and family/care givers will demonstrate understanding of plan of care, disease process/condition, diagnostic tests and medications  Outcome: Progressing     Problem: Skin Integrity  Goal: Skin integrity is maintained or improved  Outcome: Progressing     Problem: Fall Risk  Goal: Patient will remain free from falls  Outcome: Progressing     Problem: Pain - Standard  Goal: Alleviation of pain or a reduction in pain to the patient’s comfort goal  Outcome: Progressing       Patient is not progressing towards the following goals:

## 2022-06-15 NOTE — THERAPY
Occupational Therapy  Daily Treatment     Patient Name: Reji Madrigal Jr.  Age:  56 y.o., Sex:  male  Medical Record #: [id number]  Today's Date: 6/15/2022     Precautions  Precautions: Fall Risk    Assessment  Patient seen for OT treat necessitating Min A with FWW and Min A with seated ADLs.     Plan    Continue current treatment plan.    DC Equipment Recommendations: Unable to determine at this time  Discharge Recommendations: Recommend post-acute placement for additional occupational therapy services prior to discharge home (> 3 hrs of therapy a day)         Objective     06/15/22 0920   Cognition    Comments poor carryover of stroke education related to recovery process   Hand Strengthening   Comment focused therex on R hand/wrist with weight bearing and compensatory strategies. Patient verbalized frustration t/o session about R hand not functional   Activities of Daily Living   Eating   (dislikes built up gripper)   Grooming Supervision;Seated  (no R UE engagement)   Bathing Minimal Assist  (declined R UE engagement when prompted)   Upper Body Dressing Minimal Assist   Lower Body Dressing Minimal Assist   Toileting Minimal Assist  (standing with FWW)   Functional Mobility   Toilet Transfers Contact Guard Assist   Mobility FWW   Patient / Family Goals   Patient / Family Goal #1 To regain the use of his R hand   Goal #1 Outcome Progressing slower than expected   Short Term Goals   Short Term Goal # 1 Pt will demo LB dressing w/ SPV   Goal Outcome # 1 Progressing as expected   Short Term Goal # 2 Pt will grasp and release 3 objects w/ SPV using R hand   Goal Outcome # 2 Progressing slower than expected   Short Term Goal # 3 Pt will demo standing grooming w/ SPV   Goal Outcome # 3 Progressing slower than expected   Short Term Goal # 4 Pt will increase strength in hand extensors to 3/5   Goal Outcome # 4 Progressing slower than expected

## 2022-06-15 NOTE — PROGRESS NOTES
Pt is on bed, on room air, declines pain and sob. Blood glucose monitored, insulin coverage given. Fall precautions in place. All needs attended.

## 2022-06-15 NOTE — CARE PLAN
The patient is Stable - Low risk of patient condition declining or worsening    Shift Goals  Clinical Goals: pt safety  Patient Goals: ambulate with assistance    Progress made toward(s) clinical / shift goals:  yes      Problem: Optimal Care of the Stroke Patient  Goal: Optimal emergency care for the stroke patient  6/15/2022 1331 by aCta Ceja R.N.  Outcome: Progressing  6/15/2022 1329 by Cata Ceja R.N.  Outcome: Progressing  Goal: Optimal acute care for the stroke patient  6/15/2022 1331 by Cata Ceja R.N.  Outcome: Progressing  6/15/2022 1329 by Cata Ceja R.N.  Outcome: Progressing     Problem: Knowledge Deficit - Stroke Education  Goal: Patient's knowledge of stroke and risk factors will improve  6/15/2022 1331 by Cata Ceja R.N.  Outcome: Progressing  6/15/2022 1329 by Cata Ceja R.N.  Outcome: Progressing     Problem: Psychosocial - Patient Condition  Goal: Patient's ability to verbalize feelings about condition will improve  6/15/2022 1331 by Cata Ceja R.N.  Outcome: Progressing  6/15/2022 1329 by Cata Ceja R.N.  Outcome: Progressing  Goal: Patient's ability to re-evaluate and adapt role responsibilities will improve  6/15/2022 1331 by Cata Ceja R.N.  Outcome: Progressing  6/15/2022 1329 by Cata Ceja R.N.  Outcome: Progressing     Problem: Discharge Planning - Stroke  Goal: Ensure Stroke Core Measures are met prior to discharge  6/15/2022 1331 by Cata Ceja R.N.  Outcome: Progressing  6/15/2022 1329 by Cata Ceja R.N.  Outcome: Progressing  Goal: Patient’s continuum of care needs will be met  6/15/2022 1331 by Cata Ceja R.N.  Outcome: Progressing  6/15/2022 1329 by Cata Ceja R.N.  Outcome: Progressing     Problem: Neuro Status  Goal: Neuro status will remain stable or improve  6/15/2022 1331 by Cata Ceja R.N.  Outcome: Progressing  6/15/2022 1329 by Cata Ceja R.N.  Outcome: Progressing     Problem: Hemodynamic Monitoring  Goal:  Patient's hemodynamics, fluid balance and neurologic status will be stable or improve  6/15/2022 1331 by Cata Ceja R.N.  Outcome: Progressing  6/15/2022 1329 by Cata Ceja R.N.  Outcome: Progressing     Problem: Respiratory - Stroke Patient  Goal: Patient will achieve/maintain optimum respiratory rate/effort  6/15/2022 1331 by Cata Ceja R.N.  Outcome: Progressing  6/15/2022 1329 by Cata Ceja R.N.  Outcome: Progressing     Problem: Dysphagia  Goal: Dysphagia will improve  6/15/2022 1331 by Cata Ceja R.N.  Outcome: Progressing  6/15/2022 1329 by Cata Ceja R.N.  Outcome: Progressing     Problem: Risk for Aspiration  Goal: Patient's risk for aspiration will be absent or decrease  6/15/2022 1331 by Cata Ceja R.N.  Outcome: Progressing  6/15/2022 1329 by Cata Ceja R.N.  Outcome: Progressing     Problem: Urinary Elimination  Goal: Establish and maintain regular urinary output  6/15/2022 1331 by Cata Ceja R.N.  Outcome: Progressing  6/15/2022 1329 by Cata Ceja R.N.  Outcome: Progressing     Problem: Bowel Elimination  Goal: Establish and maintain regular bowel function  6/15/2022 1331 by Cata Ceja R.N.  Outcome: Progressing  6/15/2022 1329 by Cata Ceja R.N.  Outcome: Progressing     Problem: Mobility - Stroke  Goal: Patient's capacity to carry out activities will improve  6/15/2022 1331 by Cata Ceja R.N.  Outcome: Progressing  6/15/2022 1329 by Cata Ceja R.N.  Outcome: Progressing  Goal: Spasticity will be prevented or improved  6/15/2022 1331 by Cata Ceja R.N.  Outcome: Progressing  6/15/2022 1329 by Cata Ceja R.N.  Outcome: Progressing  Goal: Subluxation will be prevented or improved  6/15/2022 1331 by Cata McKirdy, R.N.  Outcome: Progressing  6/15/2022 1329 by Cata Ceja R.N.  Outcome: Progressing     Problem: Self Care  Goal: Patient will have the ability to perform ADLs independently or with assistance (bathe, groom, dress, toilet and  feed)  6/15/2022 1331 by Cata Ceja R.N.  Outcome: Progressing  6/15/2022 1329 by Cata Ceja R.N.  Outcome: Progressing     Problem: Knowledge Deficit - Standard  Goal: Patient and family/care givers will demonstrate understanding of plan of care, disease process/condition, diagnostic tests and medications  6/15/2022 1331 by Cata Ceja R.N.  Outcome: Progressing  6/15/2022 1329 by Cata Ceja R.N.  Outcome: Progressing     Problem: Skin Integrity  Goal: Skin integrity is maintained or improved  6/15/2022 1331 by Cata Ceja R.N.  Outcome: Progressing  6/15/2022 1329 by Cata Ceja R.N.  Outcome: Progressing     Problem: Fall Risk  Goal: Patient will remain free from falls  6/15/2022 1331 by Cata Ceja R.N.  Outcome: Progressing  6/15/2022 1329 by Cata Ceja R.N.  Outcome: Progressing     Problem: Pain - Standard  Goal: Alleviation of pain or a reduction in pain to the patient’s comfort goal  6/15/2022 1331 by Cata Ceja R.N.  Outcome: Progressing  6/15/2022 1329 by Cata Ceja R.N.  Outcome: Progressing       Patient is not progressing towards the following goals:

## 2022-06-15 NOTE — DISCHARGE PLANNING
Anticipated Discharge Disposition: rehab     Action: Rev’d dc plans with pt at bedside. He has secured a place to dc to after he completes his rehab. He will dc to his girlfiend’s house in [place]. Her contact info is:  Monik Newman, [facility], [address], [place], [place], [address], phone is [phone], email is [email or web address].  In addition, his best friend from high school lives 5 miles down the street from her.     Jennifer texted to Bridge with [facility] to re-eval for rehab admission.   MELO Jeter to resend referrals to [facility] and [facility].     Await their acceptance.      Barriers to Discharge: pending [facility] and [facility] re-review.      Plan: pending re-asses by rehab and [facility].

## 2022-06-15 NOTE — PROGRESS NOTES
[facility] Medicine Daily Progress Note    Date of Service  6/15/2022    Chief Complaint  Reji Madrigal Jr. is a 56 y.o. male admitted 6/3/2022 with stroke.    Hospital Course  Reji Madrigal Jr. is a 56 y.o. male who presented 6/3/2022 with right-sided weakness.  The patient has a history of hypertension diabetes and coronary artery disease was admitted to [facility] after presenting with right-sided weakness and was diagnosed with acute stroke he subsequently left A as he was frustrated with waiting for rehab placement, and presented to our emergency department.  The patient reports that his right-sided weakness is improving.  He denies any headache.  His dysarthria is improved and is tolerating his diet.  No fever or chills.  The patient had a work-up for stroke at the outlying facility with noncontrast MRI revealing 2 small areas of acute infarct in the left corona radiata and previous right thalamic infarction. Patient is neurologically stable, pending SNF placement.    Interval Problem Update  No acute events overnight  Patient is medically cleared for SNF.  SNF pending acceptance. Patient to obtain housing until end of August then SNF may be willing to accept him.    I have personally seen and examined the patient at bedside. I discussed the plan of care with patient.    Consultants/Specialty  none    Code Status  Full Code    Disposition  Patient is medically cleared for discharge.   Anticipate discharge to to skilled nursing facility.  I have placed the appropriate orders for post-discharge needs.    Review of Systems  Review of Systems   Constitutional: Negative for chills and fever.   Eyes: Negative for blurred vision and pain.   Respiratory: Negative for cough and shortness of breath.    Cardiovascular: Negative for chest pain, palpitations and leg swelling.   Gastrointestinal: Negative for abdominal pain, nausea and vomiting.   Genitourinary: Negative for dysuria and  urgency.   Musculoskeletal: Negative for back pain.   Skin: Negative for itching and rash.   Neurological: Positive for focal weakness. Negative for dizziness, sensory change, speech change and headaches.   Psychiatric/Behavioral: Negative for substance abuse. The patient does not have insomnia.         Physical Exam  Temp:  [36.1 °C (97 °F)-36.3 °C (97.3 °F)] 36.2 °C (97.1 °F)  Pulse:  [56-66] 66  Resp:  [16-18] 16  BP: (130-149)/(71-86) 138/71  SpO2:  [96 %-98 %] 96 %    Physical Exam  Constitutional:       General: He is not in acute distress.     Appearance: He is not ill-appearing.   HENT:      Head: Normocephalic and atraumatic.      Right Ear: External ear normal.      Left Ear: External ear normal.      Mouth/Throat:      Pharynx: No oropharyngeal exudate or posterior oropharyngeal erythema.   Eyes:      Extraocular Movements: Extraocular movements intact.      Pupils: Pupils are equal, round, and reactive to light.   Cardiovascular:      Rate and Rhythm: Normal rate and regular rhythm.      Pulses: Normal pulses.      Heart sounds: Normal heart sounds.   Pulmonary:      Effort: Pulmonary effort is normal. No respiratory distress.      Breath sounds: Normal breath sounds. No wheezing or rales.   Abdominal:      General: Bowel sounds are normal. There is no distension.      Palpations: Abdomen is soft.      Tenderness: There is no abdominal tenderness. There is no guarding or rebound.   Musculoskeletal:         General: No swelling or tenderness.      Cervical back: Normal range of motion and neck supple.      Right lower leg: No edema.      Left lower leg: No edema.   Skin:     General: Skin is warm and dry.   Neurological:      General: No focal deficit present.      Mental Status: He is oriented to person, place, and time.      Cranial Nerves: No cranial nerve deficit.      Sensory: No sensory deficit.      Motor: Weakness present.   Psychiatric:         Mood and Affect: Mood normal.         Behavior:  Behavior normal.         Fluids    Intake/Output Summary (Last 24 hours) at 6/15/2022 1008  Last data filed at 6/15/2022 0916  Gross per 24 hour   Intake 800 ml   Output 400 ml   Net 400 ml       Laboratory                        Imaging  No orders to display        Assessment/Plan  * CVA (cerebral vascular accident) (HCC)  Assessment & Plan  Continue aspirin Plavix  Continue atorvastatin  Blood pressure control  PT OT SLP  Case management to assist with discharge planning    Type 2 diabetes mellitus with hyperglycemia, with long-term current use of insulin (HCC)  Assessment & Plan  Continue with increase Lantus 18 units continue sliding scale insulin monitor CBGs    Primary hypertension  Assessment & Plan  Continue amlodipineand losartan  Increase carvedilol 25 bid   Goal SBP is normotension  Monitor blood pressure and adjust accordingly    Hemiparesis affecting right side as late effect of stroke (HCC)  Assessment & Plan  Continue aspirin and statin  Blood pressure control  PT OT SLP  Discharge planning       VTE prophylaxis: enoxaparin ppx    I have performed a physical exam and reviewed and updated ROS and Plan today (6/15/2022). In review of yesterday's note (6/14/2022), there are no changes except as documented above.

## 2022-06-15 NOTE — DISCHARGE PLANNING
Pt has a discharge plan.  Pt will discharge to the home of Monik Newman, [address], [address], [place], [place], [address], phone is [phone], email is [email or web address].  In addition, his best friend from high school lives 5 miles down the street from her.     DPA resent SNF referrals with above information to:  [facility], [facility], [place] at 1236.

## 2022-06-16 LAB
GLUCOSE BLD STRIP.AUTO-MCNC: 148 MG/DL (ref 65–99)
GLUCOSE BLD STRIP.AUTO-MCNC: 153 MG/DL (ref 65–99)
GLUCOSE BLD STRIP.AUTO-MCNC: 155 MG/DL (ref 65–99)
GLUCOSE BLD STRIP.AUTO-MCNC: 207 MG/DL (ref 65–99)

## 2022-06-16 PROCEDURE — 700102 HCHG RX REV CODE 250 W/ 637 OVERRIDE(OP): Performed by: HOSPITALIST

## 2022-06-16 PROCEDURE — 82962 GLUCOSE BLOOD TEST: CPT | Mod: 91

## 2022-06-16 PROCEDURE — 97110 THERAPEUTIC EXERCISES: CPT

## 2022-06-16 PROCEDURE — A9270 NON-COVERED ITEM OR SERVICE: HCPCS | Performed by: HOSPITALIST

## 2022-06-16 PROCEDURE — G0378 HOSPITAL OBSERVATION PER HR: HCPCS

## 2022-06-16 PROCEDURE — 96372 THER/PROPH/DIAG INJ SC/IM: CPT

## 2022-06-16 PROCEDURE — 97112 NEUROMUSCULAR REEDUCATION: CPT

## 2022-06-16 PROCEDURE — 99224 PR SUBSEQUENT OBSERVATION CARE,LEVEL I: CPT | Performed by: STUDENT IN AN ORGANIZED HEALTH CARE EDUCATION/TRAINING PROGRAM

## 2022-06-16 PROCEDURE — 97530 THERAPEUTIC ACTIVITIES: CPT

## 2022-06-16 RX ADMIN — AMLODIPINE BESYLATE 10 MG: 5 TABLET ORAL at 05:22

## 2022-06-16 RX ADMIN — CARVEDILOL 25 MG: 25 TABLET, FILM COATED ORAL at 17:06

## 2022-06-16 RX ADMIN — INSULIN HUMAN 6 UNITS: 100 INJECTION, SOLUTION PARENTERAL at 21:31

## 2022-06-16 RX ADMIN — INSULIN GLARGINE-YFGN 15 UNITS: 100 INJECTION, SOLUTION SUBCUTANEOUS at 08:32

## 2022-06-16 RX ADMIN — DOCUSATE SODIUM 50 MG AND SENNOSIDES 8.6 MG 2 TABLET: 8.6; 5 TABLET, FILM COATED ORAL at 05:22

## 2022-06-16 RX ADMIN — ACETAMINOPHEN 650 MG: 325 TABLET ORAL at 21:31

## 2022-06-16 RX ADMIN — INSULIN HUMAN 3 UNITS: 100 INJECTION, SOLUTION PARENTERAL at 12:54

## 2022-06-16 RX ADMIN — ATORVASTATIN CALCIUM 80 MG: 40 TABLET, FILM COATED ORAL at 17:06

## 2022-06-16 RX ADMIN — ASPIRIN 81 MG: 81 TABLET, COATED ORAL at 05:23

## 2022-06-16 RX ADMIN — LOSARTAN POTASSIUM 100 MG: 50 TABLET, FILM COATED ORAL at 05:22

## 2022-06-16 RX ADMIN — CLOPIDOGREL BISULFATE 75 MG: 75 TABLET ORAL at 05:23

## 2022-06-16 RX ADMIN — DOCUSATE SODIUM 100 MG: 100 CAPSULE, LIQUID FILLED ORAL at 05:22

## 2022-06-16 RX ADMIN — INSULIN HUMAN 3 UNITS: 100 INJECTION, SOLUTION PARENTERAL at 08:31

## 2022-06-16 ASSESSMENT — COGNITIVE AND FUNCTIONAL STATUS - GENERAL
SUGGESTED CMS G CODE MODIFIER MOBILITY: CK
MOVING FROM LYING ON BACK TO SITTING ON SIDE OF FLAT BED: A LITTLE
CLIMB 3 TO 5 STEPS WITH RAILING: A LOT
MOBILITY SCORE: 18
WALKING IN HOSPITAL ROOM: A LITTLE
MOVING TO AND FROM BED TO CHAIR: A LITTLE
STANDING UP FROM CHAIR USING ARMS: A LITTLE

## 2022-06-16 ASSESSMENT — ENCOUNTER SYMPTOMS
HEADACHES: 0
DIZZINESS: 0
FEVER: 0
COUGH: 0
SPEECH CHANGE: 0
EYE PAIN: 0
NAUSEA: 0
FOCAL WEAKNESS: 1
CHILLS: 0
BLURRED VISION: 0
BACK PAIN: 0
SHORTNESS OF BREATH: 0
ABDOMINAL PAIN: 0
SENSORY CHANGE: 0
PALPITATIONS: 0
VOMITING: 0
INSOMNIA: 0

## 2022-06-16 ASSESSMENT — PAIN DESCRIPTION - PAIN TYPE
TYPE: ACUTE PAIN
TYPE: ACUTE PAIN

## 2022-06-16 ASSESSMENT — FIBROSIS 4 INDEX: FIB4 SCORE: 0.94

## 2022-06-16 ASSESSMENT — LIFESTYLE VARIABLES: SUBSTANCE_ABUSE: 0

## 2022-06-16 NOTE — PROGRESS NOTES
[facility] Medicine Daily Progress Note    Date of Service  6/16/2022    Chief Complaint  Reji Madrigal Jr. is a 56 y.o. male admitted 6/3/2022 with stroke.    Hospital Course  Reji Madrigal Jr. is a 56 y.o. male who presented 6/3/2022 with right-sided weakness.  The patient has a history of hypertension diabetes and coronary artery disease was admitted to [facility] after presenting with right-sided weakness and was diagnosed with acute stroke he subsequently left A as he was frustrated with waiting for rehab placement, and presented to our emergency department.  The patient reports that his right-sided weakness is improving.  He denies any headache.  His dysarthria is improved and is tolerating his diet.  No fever or chills.  The patient had a work-up for stroke at the outlying facility with noncontrast MRI revealing 2 small areas of acute infarct in the left corona radiata and previous right thalamic infarction. Patient is neurologically stable, pending SNF placement.    Interval Problem Update  No acute events overnight  Patient is medically cleared for SNF.  SNF pending acceptance. Patient to obtain housing until end of August then SNF may be willing to accept him.    PT/OT to re-evaluate    I have personally seen and examined the patient at bedside. I discussed the plan of care with patient.    Consultants/Specialty  none    Code Status  Full Code    Disposition  Patient is medically cleared for discharge.   Anticipate discharge to to skilled nursing facility.  I have placed the appropriate orders for post-discharge needs.    Review of Systems  Review of Systems   Constitutional: Negative for chills and fever.   Eyes: Negative for blurred vision and pain.   Respiratory: Negative for cough and shortness of breath.    Cardiovascular: Negative for chest pain, palpitations and leg swelling.   Gastrointestinal: Negative for abdominal pain, nausea and vomiting.   Genitourinary: Negative  for dysuria and urgency.   Musculoskeletal: Negative for back pain.   Skin: Negative for itching and rash.   Neurological: Positive for focal weakness. Negative for dizziness, sensory change, speech change and headaches.   Psychiatric/Behavioral: Negative for substance abuse. The patient does not have insomnia.         Physical Exam  Temp:  [36.3 °C (97.4 °F)-36.7 °C (98.1 °F)] 36.3 °C (97.4 °F)  Pulse:  [55-75] 55  Resp:  [16-18] 16  BP: (123-143)/(65-78) 140/70  SpO2:  [95 %-99 %] 96 %    Physical Exam  Constitutional:       General: He is not in acute distress.     Appearance: He is not ill-appearing.   HENT:      Head: Normocephalic and atraumatic.      Right Ear: External ear normal.      Left Ear: External ear normal.      Mouth/Throat:      Pharynx: No oropharyngeal exudate or posterior oropharyngeal erythema.   Eyes:      Extraocular Movements: Extraocular movements intact.      Pupils: Pupils are equal, round, and reactive to light.   Cardiovascular:      Rate and Rhythm: Normal rate and regular rhythm.      Pulses: Normal pulses.      Heart sounds: Normal heart sounds.   Pulmonary:      Effort: Pulmonary effort is normal. No respiratory distress.      Breath sounds: Normal breath sounds. No wheezing or rales.   Abdominal:      General: Bowel sounds are normal. There is no distension.      Palpations: Abdomen is soft.      Tenderness: There is no abdominal tenderness. There is no guarding or rebound.   Musculoskeletal:         General: No swelling or tenderness.      Cervical back: Normal range of motion and neck supple.      Right lower leg: No edema.      Left lower leg: No edema.   Skin:     General: Skin is warm and dry.   Neurological:      General: No focal deficit present.      Mental Status: He is oriented to person, place, and time.      Cranial Nerves: No cranial nerve deficit.      Sensory: No sensory deficit.      Motor: Weakness present.   Psychiatric:         Mood and Affect: Mood normal.          Behavior: Behavior normal.         Fluids    Intake/Output Summary (Last 24 hours) at 6/16/2022 1247  Last data filed at 6/16/2022 1000  Gross per 24 hour   Intake 120 ml   Output 1100 ml   Net -980 ml       Laboratory                        Imaging  No orders to display        Assessment/Plan  * CVA (cerebral vascular accident) (HCC)  Assessment & Plan  Continue aspirin Plavix  Continue atorvastatin  Blood pressure control  PT OT SLP  Case management to assist with discharge planning    Type 2 diabetes mellitus with hyperglycemia, with long-term current use of insulin ([facility])  Assessment & Plan  Continue with increase Lantus 18 units continue sliding scale insulin monitor CBGs    Primary hypertension  Assessment & Plan  Continue amlodipineand losartan  Increase carvedilol 25 bid   Goal SBP is normotension  Monitor blood pressure and adjust accordingly    Hemiparesis affecting right side as late effect of stroke ([facility])  Assessment & Plan  Continue aspirin and statin  Blood pressure control  PT OT SLP  Discharge planning       VTE prophylaxis: enoxaparin ppx    I have performed a physical exam and reviewed and updated ROS and Plan today (6/16/2022). In review of yesterday's note (6/15/2022), there are no changes except as documented above.

## 2022-06-16 NOTE — PROGRESS NOTES
Received report from RENAY Ivy. Pt is A&O x4. Pt on RA, no signs of acute distress. Pt complains of 3/10 pain to right ankle. Medicated with Tylenol per MAR. This nurse walked with the pt around the floor x2. Pt was steady and FWW was used. POC discussed with patient. Safety precautions in place, bed in lowest position, and call light and personal belongings within reach. Hourly rounding in place.

## 2022-06-16 NOTE — DISCHARGE PLANNING
Contacted girlfriend Monik Newman [phone]. Monik lives in [place] and reports she has been driving back and forth to visit the patient. Plan is to return to her home and she will provide transport after rehab (driving or flying with the patient). She lives in a single floor [facility] with 5 SEAN and works from home, she also has assistance from her son and friends as needed. Physiatry to re-eval. Will need to submit to insurance if appropriate, TCC will follow.

## 2022-06-16 NOTE — THERAPY
Physical Therapy   Daily Treatment     Patient Name: Reji Madrigal Jr.  Age:  56 y.o., Sex:  male  Medical Record #: [id number]  Today's Date: 6/16/2022     Precautions  Precautions: Fall Risk    Assessment    Pt motivated and very cooperative throughout session, mentions overall symptoms seem to be improving daily. Focused session on coordination and balance interventions such as Romberg test on firm and compliant surface such as foam pad (3x30 secs), alternating cone reaches to improve R hand coordination (3x10), and standing LE cone taps to improve R LE coordination (2x10). Pt required multiple breaks after sets due to fatigue however able to complete each intervention. Provided ice massage on R ankle for pain relief following session. Plan on continuing to progress patient and achieve goals. Will follow. Pt eager to participate in acute rehab.     Plan    Continue current treatment plan.    DC Equipment Recommendations: Unable to determine at this time  Discharge Recommendations: Recommend post-acute placement for additional physical therapy services prior to discharge home       Objective       06/16/22 1025   Cognition    Cognition / Consciousness X   Level of Consciousness Alert   Comments continues to present with limited attention with tasks   Other Treatments   Other Treatments Provided Ice massage R ankle for pain relief   Balance   Sitting Balance (Static) Fair +   Sitting Balance (Dynamic) Fair +   Standing Balance (Static) Fair   Standing Balance (Dynamic) Fair -   Weight Shift Sitting Good   Weight Shift Standing Fair   Comments w/FWW   Gait Analysis   Gait Level Of Assist   (session focused on interventions rather than gait)   # of Stairs Climbed 0   Weight Bearing Status NR   Bed Mobility    Supine to Sit Supervised   Sit to Supine Supervised   Scooting Supervised   Rolling Supervised   Skilled Intervention Verbal Cuing;Facilitation   Functional Mobility   Sit to Stand Contact Guard Assist    Mobility bed mobility, STS, coordination and balance interventions

## 2022-06-16 NOTE — CARE PLAN
The patient is Stable - Low risk of patient condition declining or worsening    Shift Goals  Clinical Goals: remain free from falls  Patient Goals: ambulate with assistance    Progress made toward(s) clinical / shift goals:  Fall precautions in place.    Patient is not progressing towards the following goals:

## 2022-06-16 NOTE — CARE PLAN
The patient is Stable - Low risk of patient condition declining or worsening    Shift Goals  Clinical Goals: safety  Patient Goals: rest    Progress made toward(s) clinical / shift goals:  yes      Problem: Optimal Care of the Stroke Patient  Goal: Optimal emergency care for the stroke patient  Outcome: Progressing  Goal: Optimal acute care for the stroke patient  Outcome: Progressing     Problem: Knowledge Deficit - Stroke Education  Goal: Patient's knowledge of stroke and risk factors will improve  Outcome: Progressing     Problem: Psychosocial - Patient Condition  Goal: Patient's ability to verbalize feelings about condition will improve  Outcome: Progressing  Goal: Patient's ability to re-evaluate and adapt role responsibilities will improve  Outcome: Progressing     Problem: Discharge Planning - Stroke  Goal: Ensure Stroke Core Measures are met prior to discharge  Outcome: Progressing  Goal: Patient’s continuum of care needs will be met  Outcome: Progressing     Problem: Neuro Status  Goal: Neuro status will remain stable or improve  Outcome: Progressing     Problem: Hemodynamic Monitoring  Goal: Patient's hemodynamics, fluid balance and neurologic status will be stable or improve  Outcome: Progressing     Problem: Respiratory - Stroke Patient  Goal: Patient will achieve/maintain optimum respiratory rate/effort  Outcome: Progressing     Problem: Dysphagia  Goal: Dysphagia will improve  Outcome: Progressing     Problem: Risk for Aspiration  Goal: Patient's risk for aspiration will be absent or decrease  Outcome: Progressing     Problem: Urinary Elimination  Goal: Establish and maintain regular urinary output  Outcome: Progressing     Problem: Bowel Elimination  Goal: Establish and maintain regular bowel function  Outcome: Progressing     Problem: Mobility - Stroke  Goal: Patient's capacity to carry out activities will improve  Outcome: Progressing  Goal: Spasticity will be prevented or improved  Outcome:  Progressing  Goal: Subluxation will be prevented or improved  Outcome: Progressing     Problem: Self Care  Goal: Patient will have the ability to perform ADLs independently or with assistance (bathe, groom, dress, toilet and feed)  Outcome: Progressing     Problem: Knowledge Deficit - Standard  Goal: Patient and family/care givers will demonstrate understanding of plan of care, disease process/condition, diagnostic tests and medications  Outcome: Progressing     Problem: Skin Integrity  Goal: Skin integrity is maintained or improved  Outcome: Progressing     Problem: Fall Risk  Goal: Patient will remain free from falls  Outcome: Progressing     Problem: Pain - Standard  Goal: Alleviation of pain or a reduction in pain to the patient’s comfort goal  Outcome: Progressing       Patient is not progressing towards the following goals:

## 2022-06-16 NOTE — THERAPY
Physical Therapy   Daily Treatment     Patient Name: Reji Madrigal Jr.  Age:  56 y.o., Sex:  male  Medical Record #: [id number]  Today's Date: 6/15/2022     Precautions  Precautions: Fall Risk    Assessment    Pt willing to cooperate with therapy upon entering room, mentions R ankle pain still bothering him specifically at night. Pt continues to improve gait distances, continues to require multiple cues of R ankle strategies and R hand  w/FWW. Pt continues to be primarily limited by R UE/LE coordination and weakness which seem to be gradually improving. Educated pt on stroke prognosis and also overall recovery timeline. Plan on continuing to progress functional mobility and achieve goals. Will follow.     Plan    Continue current treatment plan.    DC Equipment Recommendations: Unable to determine at this time  Discharge Recommendations: Recommend post-acute placement for additional physical therapy services prior to discharge home       Objective       06/15/22 1539   Cognition    Cognition / Consciousness X   Level of Consciousness Alert   Comments cooperative however limited focus with tasks. continues to have poor carryover with stroke education   Other Treatments   Other Treatments Provided A-P R ankle mobilizations and ice massage for pain relief   Balance   Sitting Balance (Static) Fair +   Sitting Balance (Dynamic) Fair +   Standing Balance (Static) Fair   Standing Balance (Dynamic) Fair -   Weight Shift Sitting Good   Weight Shift Standing Fair   Comments w/FWW   Gait Analysis   Gait Level Of Assist Contact Guard Assist   Assistive Device Front Wheel Walker   Distance (Feet) Hallway distances   # of Times Distance was Traveled 1   # of Stairs Climbed 0   Weight Bearing Status NR   Bed Mobility    Supine to Sit Supervised  (railing used and HOB elevated)   Sit to Supine Supervised   Scooting Supervised   Rolling Supervised   Functional Mobility   Sit to Stand Supervised   Mobility bed mobility, STS,  ambulation in hallway

## 2022-06-17 LAB
GLUCOSE BLD STRIP.AUTO-MCNC: 155 MG/DL (ref 65–99)
GLUCOSE BLD STRIP.AUTO-MCNC: 181 MG/DL (ref 65–99)
GLUCOSE BLD STRIP.AUTO-MCNC: 196 MG/DL (ref 65–99)
GLUCOSE BLD STRIP.AUTO-MCNC: 210 MG/DL (ref 65–99)

## 2022-06-17 PROCEDURE — A9270 NON-COVERED ITEM OR SERVICE: HCPCS | Performed by: HOSPITALIST

## 2022-06-17 PROCEDURE — 700102 HCHG RX REV CODE 250 W/ 637 OVERRIDE(OP): Performed by: HOSPITALIST

## 2022-06-17 PROCEDURE — 99213 OFFICE O/P EST LOW 20 MIN: CPT | Performed by: PHYSICAL MEDICINE & REHABILITATION

## 2022-06-17 PROCEDURE — 82962 GLUCOSE BLOOD TEST: CPT | Mod: 91

## 2022-06-17 PROCEDURE — G0378 HOSPITAL OBSERVATION PER HR: HCPCS

## 2022-06-17 PROCEDURE — 99224 PR SUBSEQUENT OBSERVATION CARE,LEVEL I: CPT | Performed by: STUDENT IN AN ORGANIZED HEALTH CARE EDUCATION/TRAINING PROGRAM

## 2022-06-17 PROCEDURE — 96372 THER/PROPH/DIAG INJ SC/IM: CPT

## 2022-06-17 PROCEDURE — 700111 HCHG RX REV CODE 636 W/ 250 OVERRIDE (IP): Performed by: HOSPITALIST

## 2022-06-17 RX ADMIN — CARVEDILOL 25 MG: 25 TABLET, FILM COATED ORAL at 18:11

## 2022-06-17 RX ADMIN — DOCUSATE SODIUM 50 MG AND SENNOSIDES 8.6 MG 2 TABLET: 8.6; 5 TABLET, FILM COATED ORAL at 05:52

## 2022-06-17 RX ADMIN — INSULIN GLARGINE-YFGN 15 UNITS: 100 INJECTION, SOLUTION SUBCUTANEOUS at 08:46

## 2022-06-17 RX ADMIN — LOSARTAN POTASSIUM 100 MG: 50 TABLET, FILM COATED ORAL at 05:52

## 2022-06-17 RX ADMIN — ASPIRIN 81 MG: 81 TABLET, COATED ORAL at 05:52

## 2022-06-17 RX ADMIN — DOCUSATE SODIUM 50 MG AND SENNOSIDES 8.6 MG 2 TABLET: 8.6; 5 TABLET, FILM COATED ORAL at 18:11

## 2022-06-17 RX ADMIN — CLOPIDOGREL BISULFATE 75 MG: 75 TABLET ORAL at 05:52

## 2022-06-17 RX ADMIN — INSULIN HUMAN 6 UNITS: 100 INJECTION, SOLUTION PARENTERAL at 18:13

## 2022-06-17 RX ADMIN — ENOXAPARIN SODIUM 40 MG: 40 INJECTION SUBCUTANEOUS at 18:11

## 2022-06-17 RX ADMIN — DOCUSATE SODIUM 100 MG: 100 CAPSULE, LIQUID FILLED ORAL at 18:11

## 2022-06-17 RX ADMIN — CARVEDILOL 25 MG: 25 TABLET, FILM COATED ORAL at 08:48

## 2022-06-17 RX ADMIN — INSULIN HUMAN 3 UNITS: 100 INJECTION, SOLUTION PARENTERAL at 20:11

## 2022-06-17 RX ADMIN — INSULIN HUMAN 3 UNITS: 100 INJECTION, SOLUTION PARENTERAL at 12:40

## 2022-06-17 RX ADMIN — DOCUSATE SODIUM 100 MG: 100 CAPSULE, LIQUID FILLED ORAL at 05:52

## 2022-06-17 RX ADMIN — AMLODIPINE BESYLATE 10 MG: 5 TABLET ORAL at 05:52

## 2022-06-17 RX ADMIN — ATORVASTATIN CALCIUM 80 MG: 40 TABLET, FILM COATED ORAL at 18:11

## 2022-06-17 RX ADMIN — INSULIN HUMAN 3 UNITS: 100 INJECTION, SOLUTION PARENTERAL at 08:46

## 2022-06-17 ASSESSMENT — ENCOUNTER SYMPTOMS
COUGH: 0
VOMITING: 0
SENSORY CHANGE: 0
FOCAL WEAKNESS: 1
INSOMNIA: 0
SPEECH CHANGE: 0
BACK PAIN: 0
EYE PAIN: 0
NAUSEA: 0
HEADACHES: 0
FEVER: 0
CHILLS: 0
BLURRED VISION: 0
ABDOMINAL PAIN: 0
PALPITATIONS: 0
DIZZINESS: 0
SHORTNESS OF BREATH: 0

## 2022-06-17 ASSESSMENT — PAIN DESCRIPTION - PAIN TYPE: TYPE: ACUTE PAIN

## 2022-06-17 ASSESSMENT — LIFESTYLE VARIABLES: SUBSTANCE_ABUSE: 0

## 2022-06-17 NOTE — DISCHARGE PLANNING
Per physiatry patient is adamant about IPR, but is ambulating hallway distances and unlikely to meet criteria. Submitted to [employer] for authorization. Provided update to case management.

## 2022-06-17 NOTE — PROGRESS NOTES
Received report from RENAY Ivy. Pt is A&O x4. Pt on RA, no signs of acute distress. Pt complains of 3/10 pain to right ankle. Medicated with Tylenol per MAR. Care Aids walked with the pt around the floor x2. Pt was steady and FWW was used. POC discussed with patient. Safety precautions in place, bed in lowest position, and call light and personal belongings within reach. Hourly rounding in place.

## 2022-06-17 NOTE — CARE PLAN
The patient is Stable - Low risk of patient condition declining or worsening    Shift Goals  Clinical Goals: safety  Patient Goals: walk    Progress made toward(s) clinical / shift goals:  Pt remains free of falls with safety precautions in place.    Patient is not progressing towards the following goals:

## 2022-06-17 NOTE — CARE PLAN
The patient is Stable - Low risk of patient condition declining or worsening    Shift Goals  Clinical Goals: Remain free from falls  Patient Goals: Walk around unit    Progress made toward(s) clinical / shift goals:  Fall precautions in place.     Patient is not progressing towards the following goals:

## 2022-06-17 NOTE — PREADMISSION SCREENING NOTE
Updated Pre-Screen Assessment     Name: Reji Madrigal Jr.  MRN: [id number]  : 1965    Medical Status/ Changes:   Hospital Medicine Daily Progress Note     Date of Service  2022     Chief Complaint  Reji Madrigal Jr. is a 56 y.o. male admitted 6/3/2022 with stroke.     Hospital Course  Reji Madrigal Jr. is a 56 y.o. male who presented 6/3/2022 with right-sided weakness.  The patient has a history of hypertension diabetes and coronary artery disease was admitted to [facility] after presenting with right-sided weakness and was diagnosed with acute stroke he subsequently left [place] as he was frustrated with waiting for rehab placement, and presented to our emergency department.  The patient reports that his right-sided weakness is improving.  He denies any headache.  His dysarthria is improved and is tolerating his diet.  No fever or chills.  The patient had a work-up for stroke at the outlying facility with noncontrast MRI revealing 2 small areas of acute infarct in the left corona radiata and previous right thalamic infarction. Patient is neurologically stable, pending SNF placement.     Interval Problem Update  Stroke-feels about the same today, Ambulating with FWW and nursing staff, 10 laps per day, doing modified sit ups and push ups in his bed. Still has profound weakness of the R side, especially the hand and fingers with fine motor skills. Denies any new symptoms. BP is ok. Feels that his speech is improving.     I have personally seen and examined the patient at bedside. I discussed the plan of care with patient.     Consultants/Specialty  Physiatry     Code Status  Full Code     Disposition  Patient is medically cleared for discharge.   Anticipate discharge to to skilled nursing facility.  I have placed the appropriate orders for post-discharge needs.     Review of Systems  Review of Systems   Constitutional: Negative for fever.   Respiratory: Negative for shortness  of breath.    Cardiovascular: Negative for leg swelling.   Gastrointestinal: Negative for abdominal pain.   Genitourinary: Negative for urgency.   Musculoskeletal: Negative for back pain.   Skin: Negative for rash.   Neurological: Positive for speech change (mild, but improving) and focal weakness. Negative for dizziness, sensory change and headaches.   Psychiatric/Behavioral: Negative for substance abuse. The patient does not have insomnia.    All other systems reviewed and are negative.        Physical Exam  Temp:  [36.4 °C (97.6 °F)-36.6 °C (97.9 °F)] 36.6 °C (97.8 °F)  Pulse:  [61-72] 72  Resp:  [17-18] 18  BP: (108-159)/(57-89) 108/57  SpO2:  [92 %-99 %] 92 %     Physical Exam  Constitutional:       General: He is not in acute distress.     Appearance: He is not ill-appearing.      Comments: Laying in bed, prone position   HENT:      Head: Normocephalic and atraumatic.      Right Ear: External ear normal.      Left Ear: External ear normal.      Mouth/Throat:      Pharynx: No oropharyngeal exudate or posterior oropharyngeal erythema.      Comments: Very mild dysarthria noted  Eyes:      Extraocular Movements: Extraocular movements intact.      Pupils: Pupils are equal, round, and reactive to light.   Cardiovascular:      Rate and Rhythm: Normal rate and regular rhythm.      Pulses: Normal pulses.      Heart sounds: Normal heart sounds.   Pulmonary:      Effort: Pulmonary effort is normal.      Breath sounds: Normal breath sounds. No wheezing.   Abdominal:      General: Bowel sounds are normal.      Palpations: Abdomen is soft.      Tenderness: There is no abdominal tenderness.   Musculoskeletal:         General: No swelling or tenderness.      Cervical back: Normal range of motion and neck supple.      Right lower leg: No edema.      Left lower leg: No edema.   Skin:     General: Skin is warm and dry.   Neurological:      General: No focal deficit present.      Mental Status: He is oriented to person, place,  and time.      Cranial Nerves: No cranial nerve deficit.      Sensory: No sensory deficit.      Motor: Weakness present.      Comments: Right hand weakness, gait instability  RLE weakness  RLE>RUE   Psychiatric:         Mood and Affect: Mood normal.         Behavior: Behavior normal.            Fluids     Intake/Output Summary (Last 24 hours) at 2022 1318  Last data filed at 2022 0309      Gross per 24 hour   Intake 240 ml   Output 900 ml   Net -660 ml         Laboratory                         Imaging  No orders to display         Assessment/Plan  * CVA (cerebral vascular accident) (HCC)- (present on admission)  Assessment & Plan  Continue aspirin Plavix (plavix can be stopped 21 days after event, so around )  Continue atorvastatin  Blood pressure control  See above  Highly motivated individual     Type 2 diabetes mellitus with hyperglycemia, with long-term current use of insulin ([facility])- (present on admission)  Assessment & Plan  Lantus 15 units QAM  continue sliding scale insulin monitor CBGs  Serum BS's are decently controlled a tthis time     Primary hypertension- (present on admission)  Assessment & Plan  BP is decently controlled  Losartan, coreg, norvasc  Monitor, adjust PRN     Hemiparesis affecting right side as late effect of stroke (HCC)- (present on admission)  Assessment & Plan  Continue aspirin and statin  RUE is worse than RLE (especially the hand), is R hand dominant  Blood pressure control  Re-evaluate with therapies on , IRF versus SNF        VTE prophylaxis: enoxaparin ppx     I have performed a physical exam and reviewed and updated ROS and Plan today (2022). In review of yesterday's note (2022), there are no changes except as documented above.       Functional Status/ Changes: N/A    Reviewer: Ara Soriano  Date: 2022  Time: 9:59 AM    Pre-Admission Screening Form    Patient Information:   Name: Reji Madrigal Jr.     MRN: [id number]       : 1965       Age: 56 y.o.   Gender: male      Race: White [7]       Marital Status:  [2]  Family Contact: Monik Newman [place]        Relationship: Significant other [13]  Son [15]  Home Phone:              Cell Phone: [phone]  Advanced Directives: None  Code Status:  FULL  Current Attending Provider: Geovani Figueroa*  Referring Physician: Dr. Manzanares      Physiatrist Consult: Dr. Villalobos       Referral Date: 22  Primary Payor Source:  [employer] / [employer]  Secondary Payor Source:      Medical Information:   Date of Admission to Acute Care [place]/3/2022  Room Number: S619/00  Rehabilitation Diagnosis: 0001.2 - Stroke: Right Body Involvement (Left Brain)    There is no immunization history on file for this patient.  Allergies   Allergen Reactions   • Penicillins Unspecified     Past Medical History:   Diagnosis Date   • Diabetes (HCC)    • Hypertension    • Stroke (HCC)      History reviewed. No pertinent surgical history.    History Leading to Admission, Conditions that Caused the Need for Rehab (CMS):     Hospital Medicine History & Physical Note     Date of Service  2022     Primary Care Physician  No primary care provider on file.     Consultants        Code Status  Full Code     Chief Complaint       Chief Complaint   Patient presents with   • Other       Pt suffered a stroke on  and was admitted to [facility] where he has remained in patient up until approximately an hour ago. Pt elected to leave AMA from their facility. Presents to us today with the hope that we can coordinate his transfer to a rehab facility.          History of Presenting Illness  Reji Madrigal JrAmado is a 56 y.o. male who presented 6/3/2022 with right-sided weakness.  The patient has a history of hypertension diabetes and coronary artery disease was admitted to [facility] after presenting with right-sided weakness and was diagnosed with acute stroke he subsequently  left AMA and presented to our emergency department and was emergency department with failed attempt to place him in rehab.  The patient reports that his right-sided weakness is improving.  He denies any headache.  His dysarthria is improved and is tolerating his diet.  No fever or chills.  The patient had a work-up for stroke at the outlying facility with noncontrast MRI revealing 2 small areas of acute infarct in the left corona radiata and previous right thalamic infarction.     I discussed the plan of care with patient.     Review of Systems  Review of Systems   Constitutional: Negative for chills and fever.   Neurological: Positive for speech change, focal weakness and weakness.   All other systems reviewed and are negative.        Past Medical History   has a past medical history of Diabetes (HCC), Hypertension, and Stroke (HCC).  CAD     Surgical History  Reviewed and not pertinent to the presenting problem     Family History  family history is not on file.   Family history reviewed with patient. There is family history that is pertinent to the chief complaint.      Social History   reports that he has never smoked. He has never used smokeless tobacco. He reports current alcohol use. He reports that he does not use drugs.     Allergies       Allergies   Allergen Reactions   • Penicillins Unspecified         Medications  None         Physical Exam  Temp:  [36.1 °C (97 °F)] 36.1 °C (97 °F)  Pulse:  [58-84] 76  Resp:  [13-18] 14  BP: (113-197)/() 143/90  SpO2:  [91 %-97 %] 95 %  Blood Pressure: (!) 143/90   Temperature: 36.1 °C (97 °F)   Pulse: 76   Respiration: 14   Pulse Oximetry: 95 %         Physical Exam  Vitals and nursing note reviewed.   Constitutional:       Appearance: He is well-developed. He is not diaphoretic.   HENT:      Head: Normocephalic and atraumatic.      Mouth/Throat:      Pharynx: No oropharyngeal exudate.   Eyes:      General: No scleral icterus.        Right eye: No discharge.          Left eye: No discharge.      Conjunctiva/sclera: Conjunctivae normal.      Pupils: Pupils are equal, round, and reactive to light.   Neck:      Vascular: No JVD.      Trachea: No tracheal deviation.   Cardiovascular:      Rate and Rhythm: Normal rate and regular rhythm.      Heart sounds: No murmur heard.    No friction rub. No gallop.   Pulmonary:      Effort: Pulmonary effort is normal. No respiratory distress.      Breath sounds: Normal breath sounds. No stridor. No wheezing.   Chest:      Chest wall: No tenderness.   Abdominal:      General: Bowel sounds are normal. There is no distension.      Palpations: Abdomen is soft.      Tenderness: There is no abdominal tenderness. There is no rebound.   Musculoskeletal:         General: No tenderness.      Cervical back: Neck supple.   Skin:     General: Skin is warm and dry.      Nails: There is no clubbing.   Neurological:      Mental Status: He is alert and oriented to person, place, and time.      Cranial Nerves: Cranial nerve deficit present.      Motor: Weakness present. No abnormal muscle tone.      Comments: Right hemiparesis 4/5 distal worse than proximal  Mild dysarthria   Psychiatric:         Behavior: Behavior normal.            Laboratory:      Recent Labs     06/03/22  2316   WBC 9.6   RBC 5.51   HEMOGLOBIN 16.7   HEMATOCRIT 48.7   MCV 88.4   MCH 30.3   MCHC 34.3   RDW 40.8   PLATELETCT 304   MPV 11.2          Recent Labs     06/03/22  2316   SODIUM 140   POTASSIUM 3.7   CHLORIDE 106   CO2 21   GLUCOSE 223*   BUN 17   CREATININE 0.80   CALCIUM 9.1          Recent Labs     06/03/22  2316   ALTSGPT 43   ASTSGOT 33   ALKPHOSPHAT 76   TBILIRUBIN 0.5   GLUCOSE 223*          No results for input(s): NTPROBNP in the last 72 hours.      No results for input(s): TROPONINT in the last 72 hours.     Imaging:  No orders to display               Assessment/Plan:  Justification for Admission Status  I anticipate this patient is appropriate for observation status at  this time because Rehab referral and placement     * CVA (cerebral vascular accident) ([facility])  Assessment & Plan  Continue aspirin Plavix  Continue atorvastatin  Blood pressure control  PT OT SLP  Case management to assist with discharge planning     Type 2 diabetes mellitus with hyperglycemia, with long-term current use of insulin ([facility])  Assessment & Plan  Continue with increase Lantus 18 units continue sliding scale insulin monitor CBGs  Check hemoglobin A1c     Primary hypertension  Assessment & Plan  Continue amlodipineand losartan  Increase carvedilol 25 bid   Goal SBP is normotension  Monitor blood pressure and adjust accordingly     Hemiparesis affecting right side as late effect of stroke ([facility])  Assessment & Plan  Continue aspirin and statin  Blood pressure control  PT OT SLP  Discharge planning        VTE prophylaxis: enoxaparin ppx                Hospital Medicine Consultation     Date of Service  6/4/2022     Referring Physician  Charmaine Bernstein D.O.     Consulting Physician  Nagi Bernal M.D.     Reason for Consultation  Medical management     History of Presenting Illness  56 y.o. male with history of diabetes, hypertension and stroke 5 days ago who presented 6/3/2022 for evaluation for post stroke care.  The patient states that he was admitted to [facility] on 30 May after he was found to have a stroke causing right-sided deficits.    The patient states has been in the hospital for about 5 days and left today as he was not happy with his care.  He states he has a known history of diabetes myelitis and he has been noncompliant with medication prior to the stroke.  He also is a known history of hypertension.  He states he did not receive thrombolytics and the treatment was medical management.  They are attempting to find the patient a rehabilitative facility.  He states his speech has been improving as well as some strength in the right upper and lower extremity.  However he still has  profound loss of function to the right arm as well as weakness that has continued to the right leg.  He also some slight difficulty with speech and some right facial weakness.     Review of discharge summary.  Noncontrast MRI brain.  2 small areas of acute infarct left corona radiata.,  Previous right thalamic infarction.        Review of Systems  Review of Systems   Constitutional: Negative for fever, malaise/fatigue and weight loss.   HENT: Negative for sore throat and tinnitus.    Eyes: Negative for blurred vision and double vision.   Respiratory: Negative for cough, hemoptysis and stridor.    Cardiovascular: Negative for chest pain and palpitations.   Gastrointestinal: Negative for nausea and vomiting.   Genitourinary: Negative for dysuria and urgency.   Musculoskeletal: Negative for myalgias and neck pain.   Skin: Negative for itching and rash.   Neurological: Negative for dizziness and headaches.   Endo/Heme/Allergies: Does not bruise/bleed easily.   Psychiatric/Behavioral: Negative for depression. The patient does not have insomnia.          Past Medical History   has a past medical history of Diabetes ([facility]), Hypertension, and Stroke ([facility]).     Surgical History  Denies thoracoabdominal surgery     Family History  Hypertension     Social History   reports that he has never smoked. He has never used smokeless tobacco. He reports current alcohol use. He reports that he does not use drugs.     Medications  Discharge medications.     Norvasc 5 mg p.o. twice daily  Aspirin 324 mg p.o. daily,  Atorvastatin 80 mg p.o. nightly  Coreg 12.5 mg p.o. twice daily  Plavix 75 mg p.o. daily,  NovoLog insulin sliding scale before every meal,  Levemir 10 units subcu twice daily  Losartan 100 mg daily  Acetaminophen 650 mg p.o. every 8 hours as needed pain        Allergies       Allergies   Allergen Reactions   • Penicillins Unspecified         Physical Exam  Temp:  [35.9 °C (96.7 °F)] 35.9 °C (96.7 °F)  Pulse:  [66-77]  73  Resp:  [14-17] 15  BP: (137-180)/() 137/74  SpO2:  [94 %-99 %] 95 %     Physical Exam  Vitals and nursing note reviewed.   Constitutional:       General: He is not in acute distress.     Appearance: Normal appearance. He is normal weight. He is not toxic-appearing.   HENT:      Head: Normocephalic and atraumatic.      Nose: Nose normal. No congestion or rhinorrhea.      Mouth/Throat:      Mouth: Mucous membranes are moist.      Pharynx: Oropharynx is clear.   Eyes:      Extraocular Movements: Extraocular movements intact.      Conjunctiva/sclera: Conjunctivae normal.      Pupils: Pupils are equal, round, and reactive to light.   Neck:      Vascular: No carotid bruit.   Cardiovascular:      Rate and Rhythm: Normal rate and regular rhythm.      Pulses: Normal pulses.      Heart sounds: Normal heart sounds. No murmur heard.    No gallop.   Pulmonary:      Effort: No respiratory distress.      Breath sounds: Normal breath sounds. No wheezing or rales.   Abdominal:      General: Abdomen is flat. Bowel sounds are normal. There is no distension.      Palpations: Abdomen is soft. There is no mass.      Tenderness: There is no abdominal tenderness.      Hernia: No hernia is present.   Musculoskeletal:         General: No swelling, tenderness, deformity or signs of injury.      Cervical back: Normal range of motion and neck supple. No muscular tenderness.   Lymphadenopathy:      Cervical: No cervical adenopathy.   Skin:     Capillary Refill: Capillary refill takes less than 2 seconds.      Coloration: Skin is not jaundiced or pale.      Findings: No bruising.   Neurological:      Mental Status: He is alert and oriented to person, place, and time.      Cranial Nerves: No cranial nerve deficit.      Motor: No weakness.      Coordination: Coordination normal.      Comments: Right upper extremity 4 out of 5 strength with deficits of fine motor skill.   Psychiatric:         Mood and Affect: Mood normal.         Thought  "Content: Thought content normal.         Judgment: Judgment normal.            Fluids        Laboratory      Recent Labs     06/03/22  2316   WBC 9.6   RBC 5.51   HEMOGLOBIN 16.7   HEMATOCRIT 48.7   MCV 88.4   MCH 30.3   MCHC 34.3   RDW 40.8   PLATELETCT 304   MPV 11.2          Recent Labs     06/03/22  2316   SODIUM 140   POTASSIUM 3.7   CHLORIDE 106   CO2 21   GLUCOSE 223*   BUN 17   CREATININE 0.80   CALCIUM 9.1                       Imaging        Assessment/Plan  Type 1 diabetes mellitus with other specified complication (HCC)  Assessment & Plan  Continue Levemir 10 units subcu twice daily   Humalog insulin sliding scale before every meal     Primary hypertension  Assessment & Plan  Continue Norvasc, Coreg, losartan     CVA (cerebral vascular accident) ([facility])  Assessment & Plan  Continue atorvastatin, aspirin and Plavix     Hemiparesis affecting right side as late effect of stroke ([facility])  Assessment & Plan  Follow-up physical therapy evaluation and consideration rehab       Physical Medicine and Rehabilitation Consultation  Follow up note                                                              Date of initial consultation: 6/6/2022  Consulting provider: Jose Manzanares DO  Reason for consultation: assess for acute inpatient rehab appropriateness  LOS: 0 Day(s)     Chief complaint: Nonfunctioning right hand     HPI:   Per Dr. Delgadillo's original consult \" The patient is a 56 y.o. right hand dominant male with a past medical history of diabetes, hypertension, stroke 5 days prior to presentation;  who presented on 6/3/2022 10:12 PM after leaving [place] from [facility].  Patient was unhappy with his medical care, wanted to come to [facility] for rehabilitation.     Paper records received from [facility] reflect 2 areas of acute infarction in the left corona radiata, initial NIH score 6.  Patient was admitted, started on aspirin 325 mg daily, Plavix 75 mg daily for 3 weeks, and atorvastatin 80 " "mg for secondary stroke prevention.  The patient currently reports right hand weakness, right-sided incoordination, improving speech and swallow.  In regards to support, patient has no friends or family locally that he could stay with.  Patient's previously reported girlfriend in [place] is not an option for him at this time.  Patient has an ex-wife in [place] that is not an option.  Patient has several children in the [place] area, of which his daughter could be an option for discharge support but is not a robust option.  Patient currently lives in an apartment with lease expiring in 5 days and is no other place to live.\"     6/17: Patient's hospital course has been uncomplicated, however patient's disposition and discharge plan has remained uncertain.  Latest update as of 6/16 is that patient can now go to [place] with his girlfriend.  Functionally, patient has been willing to participate with therapy in the afternoon of 6/15 patient was able to ambulate \" hallway distances\" with contact-guard assist with an FW W.  Patient is min assist for lower body dressing and upper body dressing.  Patient continues to be adamant that he would like to go to [facility].     ROS  Pertinent positives are mentioned in the HPI, all others reviewed and are negative.     Social Hx:  1 SH -second floor apartment  16 SEAN  With: Alone.     Employment:   Tobacco: Denies  Alcohol: 2 beers per day  Drugs: Denies     THERAPY:  Restrictions: Fall risk, swallow precautions  PT: Functional mobility   6/4: Walking 5 feet x 2 at mod assist  6/15 PT note: Ambulating hallway distances at a contact-guard assist level with an FW W, supervision for sit to stand.     OT: ADLs  6/4 min assist grooming and lower body dressing  6/15 OT note: Min assist for upper and lower body dressing, min assist for toileting, contact-guard assist for toilet transfers.     SLP:   None     IMAGING:  No advanced imaging from this admission " "available for my review     PROCEDURES:  None on this admission     PMH:  Past Medical History        Past Medical History:   Diagnosis Date   • Diabetes (HCC)     • Hypertension     • Stroke (HCC)              PSH:  Past Surgical History   History reviewed. No pertinent surgical history.        FHX:  Non-pertinent to today's issues     Medications:       Current Facility-Administered Medications   Medication Dose   • docusate sodium (COLACE) capsule 100 mg  100 mg   • senna-docusate (PERICOLACE or SENOKOT S) 8.6-50 MG per tablet 2 Tablet  2 Tablet     And   • polyethylene glycol/lytes (MIRALAX) PACKET 1 Packet  1 Packet     And   • magnesium hydroxide (MILK OF MAGNESIA) suspension 30 mL  30 mL     And   • bisacodyl (DULCOLAX) suppository 10 mg  10 mg   • enoxaparin (Lovenox) inj 40 mg  40 mg   • acetaminophen (Tylenol) tablet 650 mg  650 mg   • hydrALAZINE (APRESOLINE) injection 20 mg  20 mg   • carvedilol (COREG) tablet 25 mg  25 mg   • amLODIPine (NORVASC) tablet 10 mg  10 mg   • atorvastatin (LIPITOR) tablet 80 mg  80 mg   • aspirin EC (ECOTRIN) tablet 81 mg  81 mg   • clopidogrel (PLAVIX) tablet 75 mg  75 mg   • insulin regular (HumuLIN R,NovoLIN R) injection  3-15 Units   • insulin GLARGINE (Lantus,Semglee) injection  15 Units   • losartan (COZAAR) tablet 100 mg  100 mg         Allergies:       Allergies   Allergen Reactions   • Penicillins Unspecified         Physical Exam:  Vitals: /63   Pulse 68   Temp 37 °C (98.6 °F) (Temporal)   Resp 18   Ht 1.803 m (5' 11\")   Wt 88.3 kg (194 lb 10.7 oz)   SpO2 98%   Gen: NAD , laying comfortably in bed   Head: NC/AT, slight right facial droop   Eyes/ Nose/ Mouth: moist mucous membranes  Cardio: RRR, good distal perfusion, warm extremities  Pulm: normal respiratory effort, no cyanosis   Abd: Soft NTND, negative borborygmi   Ext: No peripheral edema. No calf tenderness. No clubbing.  Mood: agreeable, and cooperative   Mental status:  A&Ox4 (person, place, date, " situation) answers questions appropriately follows commands  Speech: fluent, no aphasia or dysarthria     Motor:                            Upper Extremity  Myotome R L   Shoulder flexion C5 4/5 5   Elbow flexion C5 5/5 5   Wrist extension C6 2/5 5   Elbow extension C7 4/5 5   Finger flexion C8 4/5 5   Finger abduction T1 0/5 5      Lower Extremity Myotome R L   Hip flexion L2 4/5 5   Knee extension L3 5 5   Ankle dorsiflexion L4 5 5   Toe extension L5 5 5   Ankle plantarflexion S1 5 5      Sensory:   intact to light touch through out        DTRs:  Right  Left    Brachioradialis  2+  2+   Patella tendon  2+ 2+      No clonus at bilateral ankles  Negative babinski b/l  Positive Angel right     Tone: no spasticity noted, no cogwheeling noted     Coordination:   Altered finger mag right  Altered heel-to-shin right     Labs: Reviewed and significant for   No results for input(s): RBC, HEMOGLOBIN, HEMATOCRIT, PLATELETCT, PROTHROMBTM, APTT, INR, IRON, FERRITIN, TOTIRONBC in the last 72 hours.      Recent Results         Recent Results (from the past 24 hour(s))   POCT glucose device results     Collection Time: 06/16/22  8:28 AM   Result Value Ref Range     POC Glucose, Blood 153 (H) 65 - 99 mg/dL   POCT glucose device results     Collection Time: 06/16/22 12:51 PM   Result Value Ref Range     POC Glucose, Blood 155 (H) 65 - 99 mg/dL   POCT glucose device results     Collection Time: 06/16/22  5:06 PM   Result Value Ref Range     POC Glucose, Blood 148 (H) 65 - 99 mg/dL   POCT glucose device results     Collection Time: 06/16/22  9:30 PM   Result Value Ref Range     POC Glucose, Blood 207 (H) 65 - 99 mg/dL               ASSESSMENT:  Patient is a 56 y.o. male admitted with 2 areas of acute infarction in the left corona radiata       [facility] Code / Diagnosis to Support: 0001.2 - Stroke: Right Body Involvement (Left Brain)     Rehabilitation: Impaired ADLs and mobility  Patient is not a candidate for IPR due to lack of  discharge support and stable housing.     All cases are subject to administrative review and recommendations may change     Additional Recommendations:  Left corona radiata CVA  - Greatest deficits at this time are right-sided weakness  - Continue with Plavix and statin for secondary stroke prophylaxis  -Continue with PT/OT, strong emphasis on right hand modalities, patient's deficits are in his dominant hand     Diabetes  - Continues on Lantus 18 units, will need training for bimanual technique for injections if patient is to not be transition to oral meds     Hypertension  - Continues on amlodipine, losartan and carvedilol     Disposition  - Patient continues to function below his level of baseline, at this point time patient reports he does have a discharge charge plan  - Patient continues to make functional gains but would benefit from ongoing rehab  - In order to be accepted to [facility] level therapy we will need insurance authorization  - Patient is at risk for been denied inpatient rehab due to patient's high level of function  - Determination for IRF acceptance will be based on insurance authorization and confirmation of discharge plan to [place].  - PM&R to follow peripherally        Thank you for allowing us to participate in the care of this patient.      Patient was seen for 26 minutes on unit/floor of which > 50% of time was spent on counseling and coordination of care regarding the above, including prognosis, risk reduction, benefits of treatment, and options for next stage of care.     Jm Delgadillo, DO   Physical Medicine and Rehabilitation      Please note that this dictation was created using voice recognition software. I have made every reasonable attempt to correct obvious errors, but there may be errors of grammar and possibly content that I did not discover before finalizing the note.      Co-morbidities: See PMH  Potential Risk - Complications: Deep Vein Thrombosis, Pain, Pneumonia and  "Pressure Ulcer  Level of Risk: High    Ongoing Medical Management Needed (Medical/Nursing Needs):   Patient Active Problem List    Diagnosis Date Noted   • Abnormality of gait following cerebrovascular accident (CVA) 06/06/2022   • Type 2 diabetes mellitus with hyperglycemia, with long-term current use of insulin ([facility]) 06/06/2022   • Hemiparesis affecting right side as late effect of stroke ([facility]) 06/04/2022   • CVA (cerebral vascular accident) ([facility]) 06/04/2022   • Primary hypertension 06/04/2022     A/o  Current Vital Signs:   Temperature: 36.8 °C (98.2 °F) Pulse: 62 Respiration: 18 Blood Pressure: (!) 151/78 (nurse aware)  Weight: 88.3 kg (194 lb 10.7 oz) Height: 180.3 cm (5' 11\")  Pulse Oximetry: 99 % O2 (LPM): 0      Completed Laboratory Reports:  No results for input(s): WBC, HEMOGLOBIN, HEMATOCRIT, PLATELETCT, SODIUM, POTASSIUM, BUN, CREATININE, ALBUMIN, GLUCOSE, POCGLUCOSE, INR in the last 72 hours.  Additional Labs: Not Applicable    Prior Living Situation:   Housing / Facility: 2 Story Apartment / Condo  Steps Into Home: 4  Lives with - Patient's Self Care Capacity: Alone and Able to Care For Self  Equipment Owned: None    Prior Level of Function / Living Situation:   Physical Therapy: Prior Services: Home-Independent  Housing / Facility: 2 Story Apartment / Condo  Steps Into Home: 4  Bathroom Set up: Bathtub / Shower Combination  Equipment Owned: None  Lives with - Patient's Self Care Capacity: Alone and Able to Care For Self  Bed Mobility: Independent  Transfer Status: Independent  Ambulation: Independent  Distance Ambulation (Feet):  (to tolerance)  Assistive Devices Used: None  Stairs: Independent  Current Level of Function:   Gait Level Of Assist:  (session focused on interventions rather than gait)  Assistive Device: Front Wheel Walker  Distance (Feet): 250  Deviation: Step To  # of Stairs Climbed: 0  Weight Bearing Status: NR  Skilled Intervention: Tactile Cuing, Sequencing, Postural Facilitation, " Verbal Cuing  Supine to Sit: Supervised  Sit to Supine: Supervised  Scooting: Supervised  Rolling: Supervised  Skilled Intervention: Verbal Cuing, Facilitation  Comments: both sides  Sit to Stand: Contact Guard Assist  Bed, Chair, Wheelchair Transfer: Contact Guard Assist  Toilet Transfers: Contact Guard Assist  Transfer Method: Stand Step  Skilled Intervention: Verbal Cuing  Sitting in Chair: NT  Sitting Edge of Bed: 25  Standin  Occupational Therapy:   Self Feeding: Independent  Grooming / Hygiene: Independent  Bathing: Independent  Dressing: Independent  Toileting: Independent  Medication Management: Independent  Laundry: Independent  Kitchen Mobility: Independent  Finances: Independent  Home Management: Independent  Shopping: Independent  Prior Level Of Mobility: Independent Without Device in Community, Independent Without Device in Home  Driving / Transportation: Driving Independent  Prior Services: Home-Independent  Housing / Facility: 2 Story Apartment / Condo  Occupation (Pre-Hospital Vocational): Employed Full Time ()  Current Level of Function:   Eating:  (dislikes built up gripper)  Bathing: Minimal Assist (declined R UE engagement when prompted)  Upper Body Dressing: Minimal Assist  Lower Body Dressing: Minimal Assist  Toileting: Minimal Assist (standing with FWW)  Speech Language Pathology:   Problem List: Dysphagia  Rehabilitation Prognosis/Potential: Good  Estimated Length of Stay: 7-10 days    Nursing:      [facility]    Scope/Intensity of Services Recommended:  Physical Therapy: 1.5 hr / day  5 days / week. Therapeutic Interventions Required: Maximize Endurance, Mobility, Strength and Safety  Occupational Therapy: 1.5 hr / day 5 days / week. Therapeutic Interventions Required: Maximize Self Care, ADLs, IADLs and Energy Conservation  Rehabilitation Nursin/. Therapeutic Interventions Required: Monitor Pain, Skin, Vital Signs, Intake and Output, Labs, Safety and Family  Training  Rehabilitation Physician: 3 - 5 days / week. Therapeutic Interventions Required: Medical Management    He requires 24-hour rehabilitation nursing to manage bowel and bladder function, skin care, wound, nutrition and fluid intake, pain control, safety, medication management and patient/family goals. In addition, rehabilitation nursing will reiterate and reinforce therapy skills and equipment use, including ADLs, as well as provide education to the patient and family. Reji Madrigal Jr. is willing to participate in and is able to tolerate the proposed plan of care.    Rehabilitation Goals and Plan (Expected frequency & duration of treatment in the IRF):   Return to the Community, Modified Independent Level of Care and Family Able to Provide 24/7 Assistance  Anticipated Date of Rehabilitation Admission: 06/17/22  Patient/Family oriented IRF level of care/facility/plan: Yes  Patient/Family willing to participate in IRF care/facility/plan: Yes  Patient able to tolerate IRF level of care proposed: Yes  Patient has potential to benefit IRF level of care proposed: Yes  Comments: Not Applicable    Special Needs or Precautions - Medical Necessity:  Pain Management  Current Medications:    Current Facility-Administered Medications Ordered in Epic   Medication Dose Route Frequency Provider Last Rate Last Admin   • docusate sodium (COLACE) capsule 100 mg  100 mg Oral BID Jin Mcdonough M.D.   100 mg at 06/17/22 0552   • senna-docusate (PERICOLACE or SENOKOT S) 8.6-50 MG per tablet 2 Tablet  2 Tablet Oral BID Jin Mcdonough M.D.   2 Tablet at 06/17/22 0552    And   • polyethylene glycol/lytes (MIRALAX) PACKET 1 Packet  1 Packet Oral QDAY PRN Jin Mcdonough M.D.        And   • magnesium hydroxide (MILK OF MAGNESIA) suspension 30 mL  30 mL Oral QDAY PRN Jin Mcdonough M.D.        And   • bisacodyl (DULCOLAX) suppository 10 mg  10 mg Rectal QDAY PRN Jin Mcdonough M.D.       • enoxaparin  (Lovenox) inj 40 mg  40 mg Subcutaneous DAILY AT 1800 Jin Mcdonough M.D.   40 mg at 06/14/22 1757   • acetaminophen (Tylenol) tablet 650 mg  650 mg Oral Q6HRS PRN Jin Mcdonough M.D.   650 mg at 06/16/22 2131   • hydrALAZINE (APRESOLINE) injection 20 mg  20 mg Intravenous Q6HRS PRN Jin Mcdonough M.D.       • carvedilol (COREG) tablet 25 mg  25 mg Oral BID WITH MEALS Jin Mcdonough M.D.   25 mg at 06/17/22 0848   • amLODIPine (NORVASC) tablet 10 mg  10 mg Oral Q DAY Jin Mcdonough M.D.   10 mg at 06/17/22 0552   • atorvastatin (LIPITOR) tablet 80 mg  80 mg Oral Q EVENING Jin Mcdonough M.D.   80 mg at 06/16/22 1706   • aspirin EC (ECOTRIN) tablet 81 mg  81 mg Oral DAILY Jin Mcdonough M.D.   81 mg at 06/17/22 0552   • clopidogrel (PLAVIX) tablet 75 mg  75 mg Oral DAILY Jin Mcdonough M.D.   75 mg at 06/17/22 0552   • insulin regular (HumuLIN R,NovoLIN R) injection  3-15 Units Subcutaneous 4X/DAY GALE Bernstein D.OAmado   3 Units at 06/17/22 0846   • insulin GLARGINE (Lantus,Semglee) injection  15 Units Subcutaneous QAM INSULIN Jin Mcdonough M.D.   15 Units at 06/17/22 0846   • losartan (COZAAR) tablet 100 mg  100 mg Oral Q DAY Jin Mcdonough M.D.   100 mg at 06/17/22 0552     No current [facility]-ordered outpatient medications on file.     Diet:   DIET ORDERS (From admission to next 24h)     Start     Ordered    06/06/22 1650  Diet Order Diet: Consistent CHO (Diabetic)  ALL MEALS        Question:  Diet:  Answer:  Consistent CHO (Diabetic)    06/06/22 1650                Anticipated Discharge Destination / Patient/Family Goal:  Destination: Home with Assistance Support System: Girlfriend  Anticipated home health services: OT and PT  Previously used HH service/ provider: Not Applicable  Anticipated DME Needs: Not Applicable  Outpatient Services: OT and PT  Alternative resources to address additional identified needs:   No future appointments.  Pre-Screen  Completed: 6/17/2022 9:41 AM Ara Soriano

## 2022-06-17 NOTE — PROGRESS NOTES
[facility] Medicine Daily Progress Note    Date of Service  6/17/2022    Chief Complaint  Reji Madrigal Jr. is a 56 y.o. male admitted 6/3/2022 with stroke.    Hospital Course  Reji Madrigal Jr. is a 56 y.o. male who presented 6/3/2022 with right-sided weakness.  The patient has a history of hypertension diabetes and coronary artery disease was admitted to [facility] after presenting with right-sided weakness and was diagnosed with acute stroke he subsequently left [facility] as he was frustrated with waiting for rehab placement, and presented to our emergency department.  The patient reports that his right-sided weakness is improving.  He denies any headache.  His dysarthria is improved and is tolerating his diet.  No fever or chills.  The patient had a work-up for stroke at the outlying facility with noncontrast MRI revealing 2 small areas of acute infarct in the left corona radiata and previous right thalamic infarction. Patient is neurologically stable, pending SNF placement.    Interval Problem Update  No acute events overnight  Patient no acute distress  Patient walking around hallways with walker and nursing, no acute distress  PT reevaluated patient on 6/16 and recommending postacute care  PM&R evaluated today, pending prior Auth acceptance prior to inpatient rehab consideration  SNF referral placed, pending placement    I have personally seen and examined the patient at bedside. I discussed the plan of care with patient.    Consultants/Specialty  none    Code Status  Full Code    Disposition  Patient is medically cleared for discharge.   Anticipate discharge to to skilled nursing facility.  I have placed the appropriate orders for post-discharge needs.    Review of Systems  Review of Systems   Constitutional: Negative for chills and fever.   Eyes: Negative for blurred vision and pain.   Respiratory: Negative for cough and shortness of breath.    Cardiovascular: Negative for chest pain,  palpitations and leg swelling.   Gastrointestinal: Negative for abdominal pain, nausea and vomiting.   Genitourinary: Negative for dysuria and urgency.   Musculoskeletal: Negative for back pain.   Skin: Negative for itching and rash.   Neurological: Positive for focal weakness. Negative for dizziness, sensory change, speech change and headaches.   Psychiatric/Behavioral: Negative for substance abuse. The patient does not have insomnia.         Physical Exam  Temp:  [36.1 °C (97 °F)-37 °C (98.6 °F)] 36.8 °C (98.2 °F)  Pulse:  [62-74] 62  Resp:  [16-18] 18  BP: (120-160)/(63-87) 151/78  SpO2:  [98 %-99 %] 99 %    Physical Exam  Constitutional:       General: He is not in acute distress.     Appearance: He is not ill-appearing.   HENT:      Head: Normocephalic and atraumatic.      Right Ear: External ear normal.      Left Ear: External ear normal.      Mouth/Throat:      Pharynx: No oropharyngeal exudate or posterior oropharyngeal erythema.   Eyes:      Extraocular Movements: Extraocular movements intact.      Pupils: Pupils are equal, round, and reactive to light.   Cardiovascular:      Rate and Rhythm: Normal rate and regular rhythm.      Pulses: Normal pulses.      Heart sounds: Normal heart sounds.   Pulmonary:      Effort: Pulmonary effort is normal. No respiratory distress.      Breath sounds: Normal breath sounds. No wheezing or rales.   Abdominal:      General: Bowel sounds are normal. There is no distension.      Palpations: Abdomen is soft.      Tenderness: There is no abdominal tenderness. There is no guarding or rebound.   Musculoskeletal:         General: No swelling or tenderness.      Cervical back: Normal range of motion and neck supple.      Right lower leg: No edema.      Left lower leg: No edema.   Skin:     General: Skin is warm and dry.   Neurological:      General: No focal deficit present.      Mental Status: He is oriented to person, place, and time.      Cranial Nerves: No cranial nerve  deficit.      Sensory: No sensory deficit.      Motor: Weakness present.   Psychiatric:         Mood and Affect: Mood normal.         Behavior: Behavior normal.         Fluids    Intake/Output Summary (Last 24 hours) at 6/17/2022 1207  Last data filed at 6/17/2022 1014  Gross per 24 hour   Intake 596 ml   Output 1150 ml   Net -554 ml       Laboratory                        Imaging  No orders to display        Assessment/Plan  * CVA (cerebral vascular accident) ([facility])  Assessment & Plan  Continue aspirin Plavix  Continue atorvastatin  Blood pressure control  PT OT SLP  Case management to assist with discharge planning    Type 2 diabetes mellitus with hyperglycemia, with long-term current use of insulin ([facility])  Assessment & Plan  Continue with increase Lantus 18 units continue sliding scale insulin monitor CBGs    Primary hypertension  Assessment & Plan  Continue amlodipineand losartan  Increase carvedilol 25 bid   Goal SBP is normotension  Monitor blood pressure and adjust accordingly    Hemiparesis affecting right side as late effect of stroke ([facility])  Assessment & Plan  Continue aspirin and statin  Blood pressure control  PT OT SLP  Discharge planning       VTE prophylaxis: enoxaparin ppx    I have performed a physical exam and reviewed and updated ROS and Plan today (6/17/2022). In review of yesterday's note (6/16/2022), there are no changes except as documented above.

## 2022-06-17 NOTE — CONSULTS
"    Physical Medicine and Rehabilitation Consultation  Follow up note          Date of initial consultation: 6/6/2022  Consulting provider: Jose Manzanares DO  Reason for consultation: assess for acute inpatient rehab appropriateness  LOS: 0 Day(s)    Chief complaint: Nonfunctioning right hand    HPI:   Per Dr. Delgadillo's original consult \" The patient is a 56 y.o. right hand dominant male with a past medical history of diabetes, hypertension, stroke 5 days prior to presentation;  who presented on 6/3/2022 10:12 PM after leaving AMA from [facility].  Patient was unhappy with his medical care, wanted to come to [facility] for rehabilitation.    Paper records received from [facility] reflect 2 areas of acute infarction in the left corona radiata, initial NIH score 6.  Patient was admitted, started on aspirin 325 mg daily, Plavix 75 mg daily for 3 weeks, and atorvastatin 80 mg for secondary stroke prevention.  The patient currently reports right hand weakness, right-sided incoordination, improving speech and swallow.  In regards to support, patient has no friends or family locally that he could stay with.  Patient's previously reported girlfriend in [place] is not an option for him at this time.  Patient has an ex-wife in [place] that is not an option.  Patient has several children in the [place] area, of which his daughter could be an option for discharge support but is not a robust option.  Patient currently lives in an apartment with lease expiring in 5 days and is no other place to live.\"    6/17: Patient's hospital course has been uncomplicated, however patient's disposition and discharge plan has remained uncertain.  Latest update as of 6/16 is that patient can now go to [place] with his girlfriend.  Functionally, patient has been willing to participate with therapy in the afternoon of 6/15 patient was able to ambulate \" hallway distances\" with contact-guard assist with an FW " W.  Patient is min assist for lower body dressing and upper body dressing.  Patient continues to be adamant that he would [facility].    ROS  Pertinent positives are mentioned in the HPI, all others reviewed and are negative.    Social Hx:  1  -second floor apartment  [address]  With: Alone.    Employment:   Tobacco: Denies  Alcohol: 2 beers per day  Drugs: Denies    THERAPY:  Restrictions: Fall risk, swallow precautions  PT: Functional mobility   6/4: Walking 5 feet x 2 at mod assist  6/15 PT note: Ambulating hallway distances at a contact-guard assist level with an FW W, supervision for sit to stand.    OT: ADLs  6/4 min assist grooming and lower body dressing  6/15 OT note: Min assist for upper and lower body dressing, min assist for toileting, contact-guard assist for toilet transfers.    SLP:   None    IMAGING:  No advanced imaging from this admission available for my review    PROCEDURES:  None on this admission    PMH:  Past Medical History:   Diagnosis Date   • Diabetes (HCC)    • Hypertension    • Stroke (HCC)        PSH:  History reviewed. No pertinent surgical history.    FHX:  Non-pertinent to today's issues    Medications:  Current Facility-Administered Medications   Medication Dose   • docusate sodium (COLACE) capsule 100 mg  100 mg   • senna-docusate (PERICOLACE or SENOKOT S) 8.6-50 MG per tablet 2 Tablet  2 Tablet    And   • polyethylene glycol/lytes (MIRALAX) PACKET 1 Packet  1 Packet    And   • magnesium hydroxide (MILK OF MAGNESIA) suspension 30 mL  30 mL    And   • bisacodyl (DULCOLAX) suppository 10 mg  10 mg   • enoxaparin (Lovenox) inj 40 mg  40 mg   • acetaminophen (Tylenol) tablet 650 mg  650 mg   • hydrALAZINE (APRESOLINE) injection 20 mg  20 mg   • carvedilol (COREG) tablet 25 mg  25 mg   • amLODIPine (NORVASC) tablet 10 mg  10 mg   • atorvastatin (LIPITOR) tablet 80 mg  80 mg   • aspirin EC (ECOTRIN) tablet 81 mg  81 mg   • clopidogrel (PLAVIX) tablet 75 mg  75 mg  "  • insulin regular (HumuLIN R,NovoLIN R) injection  3-15 Units   • insulin GLARGINE (Lantus,Semglee) injection  15 Units   • losartan (COZAAR) tablet 100 mg  100 mg       Allergies:  Allergies   Allergen Reactions   • Penicillins Unspecified       Physical Exam:  Vitals: /63   Pulse 68   Temp 37 °C (98.6 °F) (Temporal)   Resp 18   Ht 1.803 m (5' 11\")   Wt 88.3 kg (194 lb 10.7 oz)   SpO2 98%   Gen: NAD , laying comfortably in bed   Head: NC/AT, slight right facial droop   Eyes/ Nose/ Mouth: moist mucous membranes  Cardio: RRR, good distal perfusion, warm extremities  Pulm: normal respiratory effort, no cyanosis   Abd: Soft NTND, negative borborygmi   Ext: No peripheral edema. No calf tenderness. No clubbing.  Mood: agreeable, and cooperative   Mental status:  A&Ox4 (person, place, date, situation) answers questions appropriately follows commands  Speech: fluent, no aphasia or dysarthria    Motor:      Upper Extremity  Myotome R L   Shoulder flexion C5 4/5 5   Elbow flexion C5 5/5 5   Wrist extension C6 2/5 5   Elbow extension C7 4/5 5   Finger flexion C8 4/5 5   Finger abduction T1 0/5 5     Lower Extremity Myotome R L   Hip flexion L2 4/5 5   Knee extension L3 5 5   Ankle dorsiflexion L4 5 5   Toe extension L5 5 5   Ankle plantarflexion S1 5 5     Sensory:   intact to light touch through out      DTRs:  Right  Left    Brachioradialis  2+  2+   Patella tendon  2+ 2+     No clonus at bilateral ankles  Negative babinski b/l  Positive Anegl right    Tone: no spasticity noted, no cogwheeling noted    Coordination:   Altered finger mag right  Altered heel-to-shin right    Labs: Reviewed and significant for   No results for input(s): RBC, HEMOGLOBIN, HEMATOCRIT, PLATELETCT, PROTHROMBTM, APTT, INR, IRON, FERRITIN, TOTIRONBC in the last 72 hours.      Recent Results (from the past 24 hour(s))   POCT glucose device results    Collection Time: 06/16/22  8:28 AM   Result Value Ref Range    POC Glucose, Blood " 153 (H) 65 - 99 mg/dL   POCT glucose device results    Collection Time: 06/16/22 12:51 PM   Result Value Ref Range    POC Glucose, Blood 155 (H) 65 - 99 mg/dL   POCT glucose device results    Collection Time: 06/16/22  5:06 PM   Result Value Ref Range    POC Glucose, Blood 148 (H) 65 - 99 mg/dL   POCT glucose device results    Collection Time: 06/16/22  9:30 PM   Result Value Ref Range    POC Glucose, Blood 207 (H) 65 - 99 mg/dL         ASSESSMENT:  Patient is a 56 y.o. male admitted with 2 areas of acute infarction in the left corona radiata      [facility] Code / Diagnosis to Support: 0001.2 - Stroke: Right Body Involvement (Left Brain)    Rehabilitation: Impaired ADLs and mobility  Patient is not a candidate for IPR due to lack of discharge support and stable housing.    All cases are subject to administrative review and recommendations may change    Additional Recommendations:  Left corona radiata CVA  - Greatest deficits at this time are right-sided weakness  - Continue with Plavix and statin for secondary stroke prophylaxis  -Continue with PT/OT, strong emphasis on right hand modalities, patient's deficits are in his dominant hand    Diabetes  - Continues on Lantus 18 units, will need training for bimanual technique for injections if patient is to not be transition to oral meds    Hypertension  - Continues on amlodipine, losartan and carvedilol    Disposition  - Patient continues to function below his level of baseline, at this point time patient reports he does have a discharge charge plan  - Patient continues to make functional gains but would benefit from ongoing rehab  - In order to be accepted to IRF level therapy we will need insurance authorization  - Patient is at risk for been denied inpatient rehab due to patient's high level of function  - Determination for IRF acceptance will be based on insurance authorization and confirmation of discharge plan to [place].  - PM&R to follow peripherally      Thank  you for allowing us to participate in the care of this patient.     Patient was seen for 26 minutes on unit/floor of which > 50% of time was spent on counseling and coordination of care regarding the above, including prognosis, risk reduction, benefits of treatment, and options for next stage of care.    Jm Delgadillo, DO   Physical Medicine and Rehabilitation     Please note that this dictation was created using voice recognition software. I have made every reasonable attempt to correct obvious errors, but there may be errors of grammar and possibly content that I did not discover before finalizing the note.

## 2022-06-17 NOTE — DISCHARGE PLANNING
Agency/Facility Name: [facility]  Referral sent per MARY KATE Supervisor and Director of Nursing @ [phone]

## 2022-06-18 LAB
GLUCOSE BLD STRIP.AUTO-MCNC: 134 MG/DL (ref 65–99)
GLUCOSE BLD STRIP.AUTO-MCNC: 151 MG/DL (ref 65–99)
GLUCOSE BLD STRIP.AUTO-MCNC: 166 MG/DL (ref 65–99)
GLUCOSE BLD STRIP.AUTO-MCNC: 195 MG/DL (ref 65–99)

## 2022-06-18 PROCEDURE — 700102 HCHG RX REV CODE 250 W/ 637 OVERRIDE(OP): Performed by: HOSPITALIST

## 2022-06-18 PROCEDURE — G0378 HOSPITAL OBSERVATION PER HR: HCPCS

## 2022-06-18 PROCEDURE — 96372 THER/PROPH/DIAG INJ SC/IM: CPT

## 2022-06-18 PROCEDURE — 82962 GLUCOSE BLOOD TEST: CPT | Mod: 91

## 2022-06-18 PROCEDURE — A9270 NON-COVERED ITEM OR SERVICE: HCPCS | Performed by: HOSPITALIST

## 2022-06-18 PROCEDURE — 99225 PR SUBSEQUENT OBSERVATION CARE,LEVEL II: CPT | Performed by: HOSPITALIST

## 2022-06-18 PROCEDURE — 700111 HCHG RX REV CODE 636 W/ 250 OVERRIDE (IP): Performed by: HOSPITALIST

## 2022-06-18 RX ADMIN — DOCUSATE SODIUM 50 MG AND SENNOSIDES 8.6 MG 2 TABLET: 8.6; 5 TABLET, FILM COATED ORAL at 05:11

## 2022-06-18 RX ADMIN — DOCUSATE SODIUM 100 MG: 100 CAPSULE, LIQUID FILLED ORAL at 05:11

## 2022-06-18 RX ADMIN — INSULIN HUMAN 3 UNITS: 100 INJECTION, SOLUTION PARENTERAL at 20:02

## 2022-06-18 RX ADMIN — ASPIRIN 81 MG: 81 TABLET, COATED ORAL at 05:11

## 2022-06-18 RX ADMIN — INSULIN HUMAN 3 UNITS: 100 INJECTION, SOLUTION PARENTERAL at 17:49

## 2022-06-18 RX ADMIN — ATORVASTATIN CALCIUM 80 MG: 40 TABLET, FILM COATED ORAL at 17:47

## 2022-06-18 RX ADMIN — CARVEDILOL 25 MG: 25 TABLET, FILM COATED ORAL at 17:48

## 2022-06-18 RX ADMIN — ENOXAPARIN SODIUM 40 MG: 40 INJECTION SUBCUTANEOUS at 17:47

## 2022-06-18 RX ADMIN — INSULIN GLARGINE-YFGN 15 UNITS: 100 INJECTION, SOLUTION SUBCUTANEOUS at 07:31

## 2022-06-18 RX ADMIN — AMLODIPINE BESYLATE 10 MG: 5 TABLET ORAL at 05:11

## 2022-06-18 RX ADMIN — INSULIN HUMAN 3 UNITS: 100 INJECTION, SOLUTION PARENTERAL at 07:34

## 2022-06-18 RX ADMIN — CLOPIDOGREL BISULFATE 75 MG: 75 TABLET ORAL at 05:11

## 2022-06-18 RX ADMIN — LOSARTAN POTASSIUM 100 MG: 50 TABLET, FILM COATED ORAL at 05:11

## 2022-06-18 RX ADMIN — CARVEDILOL 25 MG: 25 TABLET, FILM COATED ORAL at 07:31

## 2022-06-18 ASSESSMENT — ENCOUNTER SYMPTOMS
HEADACHES: 0
ABDOMINAL PAIN: 0
SPEECH CHANGE: 0
FOCAL WEAKNESS: 1
PALPITATIONS: 0
INSOMNIA: 0
VOMITING: 0
COUGH: 0
SHORTNESS OF BREATH: 0
NAUSEA: 0
BLURRED VISION: 0
CHILLS: 0
EYE PAIN: 0
DIZZINESS: 0
BACK PAIN: 0
FEVER: 0
SENSORY CHANGE: 0

## 2022-06-18 ASSESSMENT — FIBROSIS 4 INDEX: FIB4 SCORE: 0.94

## 2022-06-18 ASSESSMENT — LIFESTYLE VARIABLES: SUBSTANCE_ABUSE: 0

## 2022-06-18 NOTE — CARE PLAN
The patient is Stable - Low risk of patient condition declining or worsening    Shift Goals  Clinical Goals: safety  Patient Goals: walk    Progress made toward(s) clinical / shift goals:  yes    Patient is not progressing towards the following goals:

## 2022-06-18 NOTE — PROGRESS NOTES
[facility] Medicine Daily Progress Note    Date of Service  6/18/2022    Chief Complaint  Reji Madrigal Jr. is a 56 y.o. male admitted 6/3/2022 with stroke.    Hospital Course  Reji Madrigal Jr. is a 56 y.o. male who presented 6/3/2022 with right-sided weakness.  The patient has a history of hypertension diabetes and coronary artery disease was admitted to [facility] after presenting with right-sided weakness and was diagnosed with acute stroke he subsequently left A as he was frustrated with waiting for rehab placement, and presented to our emergency department.  The patient reports that his right-sided weakness is improving.  He denies any headache.  His dysarthria is improved and is tolerating his diet.  No fever or chills.  The patient had a work-up for stroke at the outlying facility with noncontrast MRI revealing 2 small areas of acute infarct in the left corona radiata and previous right thalamic infarction. Patient is neurologically stable, pending SNF placement.    Interval Problem Update  Patient seen and examined today.  Data, Medication data reviewed.  Case discussed with nursing as available.  Plan of Care reviewed with patient and notified of changes.  6/18 Reji is slowly feeling better, he still has major difficulties with his right hand, he is an , right-handed and has great difficulty functioning, he also lives in an apartment with a staircase that he is unable to handle, he lives alone  Patient walking around hallways with walker and nursing, no acute distress  PT reevaluated patient on 6/16 and recommending postacute care  PM&R evaluated today, pending prior Auth acceptance prior to inpatient rehab consideration  SNF referral placed, pending placement  Rehab is asking for updated PT OT notes    I have personally seen and examined the patient at bedside. I discussed the plan of care with patient.    Consultants/Specialty  Physiatry    Code Status  Full  Code    Disposition  Patient is medically cleared for discharge.   Anticipate discharge to to skilled nursing facility.  I have placed the appropriate orders for post-discharge needs.    Review of Systems  Review of Systems   Constitutional: Negative for chills and fever.   Eyes: Negative for blurred vision and pain.   Respiratory: Negative for cough and shortness of breath.    Cardiovascular: Negative for chest pain, palpitations and leg swelling.   Gastrointestinal: Negative for abdominal pain, nausea and vomiting.   Genitourinary: Negative for dysuria and urgency.   Musculoskeletal: Negative for back pain.   Skin: Negative for itching and rash.   Neurological: Positive for focal weakness. Negative for dizziness, sensory change, speech change and headaches.   Psychiatric/Behavioral: Negative for substance abuse. The patient does not have insomnia.         Physical Exam  Temp:  [36.1 °C (97 °F)-37.2 °C (99 °F)] 37.2 °C (99 °F)  Pulse:  [61-67] 61  Resp:  [16-18] 18  BP: (120-151)/(70-87) 142/72  SpO2:  [96 %-99 %] 96 %    Physical Exam  Constitutional:       General: He is not in acute distress.     Appearance: He is not ill-appearing.   HENT:      Head: Normocephalic and atraumatic.      Right Ear: External ear normal.      Left Ear: External ear normal.      Mouth/Throat:      Pharynx: No oropharyngeal exudate or posterior oropharyngeal erythema.   Eyes:      Extraocular Movements: Extraocular movements intact.      Pupils: Pupils are equal, round, and reactive to light.   Cardiovascular:      Rate and Rhythm: Normal rate and regular rhythm.      Pulses: Normal pulses.      Heart sounds: Normal heart sounds.   Pulmonary:      Effort: Pulmonary effort is normal. No respiratory distress.      Breath sounds: Normal breath sounds. No wheezing or rales.   Abdominal:      General: Bowel sounds are normal. There is no distension.      Palpations: Abdomen is soft.      Tenderness: There is no abdominal tenderness. There  is no guarding or rebound.   Musculoskeletal:         General: No swelling or tenderness.      Cervical back: Normal range of motion and neck supple.      Right lower leg: No edema.      Left lower leg: No edema.   Skin:     General: Skin is warm and dry.   Neurological:      General: No focal deficit present.      Mental Status: He is oriented to person, place, and time.      Cranial Nerves: No cranial nerve deficit.      Sensory: No sensory deficit.      Motor: Weakness present.      Comments: Right hand weakness, gait instability   Psychiatric:         Mood and Affect: Mood normal.         Behavior: Behavior normal.         Fluids    Intake/Output Summary (Last 24 hours) at 6/18/2022 0740  Last data filed at 6/18/2022 0307  Gross per 24 hour   Intake 472 ml   Output 450 ml   Net 22 ml       Laboratory                        Imaging  No orders to display        Assessment/Plan  * CVA (cerebral vascular accident) (HCC)  Assessment & Plan  Continue aspirin Plavix  Continue atorvastatin  Blood pressure control  PT OT SLP  Case management to assist with discharge planning    Type 2 diabetes mellitus with hyperglycemia, with long-term current use of insulin ([facility])  Assessment & Plan  Continue with increase Lantus 18 units continue sliding scale insulin monitor CBGs    Primary hypertension  Assessment & Plan  Continue amlodipineand losartan  Increase carvedilol 25 bid   Goal SBP is normotension  Monitor blood pressure and adjust accordingly    Hemiparesis affecting right side as late effect of stroke (HCC)  Assessment & Plan  Continue aspirin and statin  Blood pressure control  PT OT SLP  Discharge planning     Plan  Attempts to place the patient either in skilled nursing or acute rehab  Will need updated PT OT evaluation  Completed the patient's FMLA paperwork  Patient at this juncture unable to perform his ADLs independently, lives alone  See orders  Reordering PT OT  VTE prophylaxis: enoxaparin ppx    I have performed  a physical exam and reviewed and updated ROS and Plan today (6/18/2022). In review of yesterday's note (6/17/2022), there are no changes except as documented above.    Please note that this dictation was created using voice recognition software. I have made every reasonable attempt to correct obvious errors, but I expect that there are errors of grammar and possibly context that I did not discover before finalizing the note.

## 2022-06-18 NOTE — CARE PLAN
Problem: Optimal Care of the Stroke Patient  Goal: Optimal emergency care for the stroke patient  Outcome: Progressing  Goal: Optimal acute care for the stroke patient  Outcome: Progressing     Problem: Knowledge Deficit - Stroke Education  Goal: Patient's knowledge of stroke and risk factors will improve  Outcome: Progressing     Problem: Psychosocial - Patient Condition  Goal: Patient's ability to verbalize feelings about condition will improve  Outcome: Progressing  Goal: Patient's ability to re-evaluate and adapt role responsibilities will improve  Outcome: Progressing     Problem: Discharge Planning - Stroke  Goal: Ensure Stroke Core Measures are met prior to discharge  Outcome: Progressing  Goal: Patient’s continuum of care needs will be met  Outcome: Progressing     Problem: Neuro Status  Goal: Neuro status will remain stable or improve  Outcome: Progressing     Problem: Hemodynamic Monitoring  Goal: Patient's hemodynamics, fluid balance and neurologic status will be stable or improve  Outcome: Progressing     Problem: Respiratory - Stroke Patient  Goal: Patient will achieve/maintain optimum respiratory rate/effort  Outcome: Progressing     Problem: Dysphagia  Goal: Dysphagia will improve  Outcome: Progressing     Problem: Risk for Aspiration  Goal: Patient's risk for aspiration will be absent or decrease  Outcome: Progressing     Problem: Urinary Elimination  Goal: Establish and maintain regular urinary output  Outcome: Progressing     Problem: Bowel Elimination  Goal: Establish and maintain regular bowel function  Outcome: Progressing     Problem: Mobility - Stroke  Goal: Patient's capacity to carry out activities will improve  Outcome: Progressing  Goal: Spasticity will be prevented or improved  Outcome: Progressing  Goal: Subluxation will be prevented or improved  Outcome: Progressing     Problem: Self Care  Goal: Patient will have the ability to perform ADLs independently or with assistance (bathe,  groom, dress, toilet and feed)  Outcome: Progressing     Problem: Knowledge Deficit - Standard  Goal: Patient and family/care givers will demonstrate understanding of plan of care, disease process/condition, diagnostic tests and medications  Outcome: Progressing     Problem: Skin Integrity  Goal: Skin integrity is maintained or improved  Outcome: Progressing     Problem: Fall Risk  Goal: Patient will remain free from falls  Outcome: Progressing     Problem: Pain - Standard  Goal: Alleviation of pain or a reduction in pain to the patient’s comfort goal  Outcome: Progressing   The patient is Stable - Low risk of patient condition declining or worsening    Shift Goals  Clinical Goals: mobility, discharge planning  Patient Goals: mobility, discharge planning    Progress made toward(s) clinical / shift goals:  yes    Patient is not progressing towards the following goals:

## 2022-06-18 NOTE — DISCHARGE PLANNING
Follow up for post acute services Patient choice is for IRF level of care. Limited documented therapy need. Please have PT/OT follow up Monday 06/20.

## 2022-06-19 LAB
GLUCOSE BLD STRIP.AUTO-MCNC: 149 MG/DL (ref 65–99)
GLUCOSE BLD STRIP.AUTO-MCNC: 160 MG/DL (ref 65–99)
GLUCOSE BLD STRIP.AUTO-MCNC: 178 MG/DL (ref 65–99)
GLUCOSE BLD STRIP.AUTO-MCNC: 178 MG/DL (ref 65–99)

## 2022-06-19 PROCEDURE — 96372 THER/PROPH/DIAG INJ SC/IM: CPT

## 2022-06-19 PROCEDURE — A9270 NON-COVERED ITEM OR SERVICE: HCPCS | Performed by: HOSPITALIST

## 2022-06-19 PROCEDURE — 700102 HCHG RX REV CODE 250 W/ 637 OVERRIDE(OP): Performed by: HOSPITALIST

## 2022-06-19 PROCEDURE — 700111 HCHG RX REV CODE 636 W/ 250 OVERRIDE (IP): Performed by: HOSPITALIST

## 2022-06-19 PROCEDURE — G0378 HOSPITAL OBSERVATION PER HR: HCPCS

## 2022-06-19 PROCEDURE — 99225 PR SUBSEQUENT OBSERVATION CARE,LEVEL II: CPT | Performed by: INTERNAL MEDICINE

## 2022-06-19 PROCEDURE — 82962 GLUCOSE BLOOD TEST: CPT | Mod: 91

## 2022-06-19 RX ADMIN — INSULIN GLARGINE-YFGN 15 UNITS: 100 INJECTION, SOLUTION SUBCUTANEOUS at 08:35

## 2022-06-19 RX ADMIN — INSULIN HUMAN 3 UNITS: 100 INJECTION, SOLUTION PARENTERAL at 17:04

## 2022-06-19 RX ADMIN — ENOXAPARIN SODIUM 40 MG: 40 INJECTION SUBCUTANEOUS at 17:03

## 2022-06-19 RX ADMIN — ASPIRIN 81 MG: 81 TABLET, COATED ORAL at 04:37

## 2022-06-19 RX ADMIN — AMLODIPINE BESYLATE 10 MG: 5 TABLET ORAL at 04:37

## 2022-06-19 RX ADMIN — INSULIN HUMAN 3 UNITS: 100 INJECTION, SOLUTION PARENTERAL at 20:10

## 2022-06-19 RX ADMIN — LOSARTAN POTASSIUM 100 MG: 50 TABLET, FILM COATED ORAL at 04:37

## 2022-06-19 RX ADMIN — INSULIN HUMAN 3 UNITS: 100 INJECTION, SOLUTION PARENTERAL at 12:07

## 2022-06-19 RX ADMIN — CARVEDILOL 25 MG: 25 TABLET, FILM COATED ORAL at 08:34

## 2022-06-19 RX ADMIN — CLOPIDOGREL BISULFATE 75 MG: 75 TABLET ORAL at 04:37

## 2022-06-19 RX ADMIN — CARVEDILOL 25 MG: 25 TABLET, FILM COATED ORAL at 17:03

## 2022-06-19 RX ADMIN — DOCUSATE SODIUM 100 MG: 100 CAPSULE, LIQUID FILLED ORAL at 04:38

## 2022-06-19 RX ADMIN — DOCUSATE SODIUM 50 MG AND SENNOSIDES 8.6 MG 2 TABLET: 8.6; 5 TABLET, FILM COATED ORAL at 04:38

## 2022-06-19 RX ADMIN — ATORVASTATIN CALCIUM 80 MG: 40 TABLET, FILM COATED ORAL at 17:03

## 2022-06-19 ASSESSMENT — ENCOUNTER SYMPTOMS
SENSORY CHANGE: 0
FEVER: 0
SHORTNESS OF BREATH: 0
BACK PAIN: 0
FOCAL WEAKNESS: 1
INSOMNIA: 0
ABDOMINAL PAIN: 0
DIZZINESS: 0
SPEECH CHANGE: 1
HEADACHES: 0

## 2022-06-19 ASSESSMENT — LIFESTYLE VARIABLES
ON A TYPICAL DAY WHEN YOU DRINK ALCOHOL HOW MANY DRINKS DO YOU HAVE: 0
EVER HAD A DRINK FIRST THING IN THE MORNING TO STEADY YOUR NERVES TO GET RID OF A HANGOVER: NO
AVERAGE NUMBER OF DAYS PER WEEK YOU HAVE A DRINK CONTAINING ALCOHOL: 0
HAVE YOU EVER FELT YOU SHOULD CUT DOWN ON YOUR DRINKING: NO
ALCOHOL_USE: NO
CONSUMPTION TOTAL: NEGATIVE
SUBSTANCE_ABUSE: 0
TOTAL SCORE: 0
TOTAL SCORE: 0
HAVE PEOPLE ANNOYED YOU BY CRITICIZING YOUR DRINKING: NO
EVER FELT BAD OR GUILTY ABOUT YOUR DRINKING: NO
DOES PATIENT WANT TO STOP DRINKING: NO
HOW MANY TIMES IN THE PAST YEAR HAVE YOU HAD 5 OR MORE DRINKS IN A DAY: 0
TOTAL SCORE: 0

## 2022-06-19 ASSESSMENT — PATIENT HEALTH QUESTIONNAIRE - PHQ9
2. FEELING DOWN, DEPRESSED, IRRITABLE, OR HOPELESS: NOT AT ALL
1. LITTLE INTEREST OR PLEASURE IN DOING THINGS: NOT AT ALL
SUM OF ALL RESPONSES TO PHQ9 QUESTIONS 1 AND 2: 0

## 2022-06-19 NOTE — CARE PLAN
Problem: Optimal Care of the Stroke Patient  Goal: Optimal emergency care for the stroke patient  Outcome: Progressing  Goal: Optimal acute care for the stroke patient  Outcome: Progressing     Problem: Knowledge Deficit - Stroke Education  Goal: Patient's knowledge of stroke and risk factors will improve  Outcome: Progressing     Problem: Psychosocial - Patient Condition  Goal: Patient's ability to verbalize feelings about condition will improve  Outcome: Progressing  Goal: Patient's ability to re-evaluate and adapt role responsibilities will improve  Outcome: Progressing     Problem: Discharge Planning - Stroke  Goal: Ensure Stroke Core Measures are met prior to discharge  Outcome: Progressing  Goal: Patient’s continuum of care needs will be met  Outcome: Progressing     Problem: Neuro Status  Goal: Neuro status will remain stable or improve  Outcome: Progressing     Problem: Hemodynamic Monitoring  Goal: Patient's hemodynamics, fluid balance and neurologic status will be stable or improve  Outcome: Progressing     Problem: Respiratory - Stroke Patient  Goal: Patient will achieve/maintain optimum respiratory rate/effort  Outcome: Progressing     Problem: Dysphagia  Goal: Dysphagia will improve  Outcome: Progressing     Problem: Risk for Aspiration  Goal: Patient's risk for aspiration will be absent or decrease  Outcome: Progressing     Problem: Urinary Elimination  Goal: Establish and maintain regular urinary output  Outcome: Progressing     Problem: Bowel Elimination  Goal: Establish and maintain regular bowel function  Outcome: Progressing     Problem: Mobility - Stroke  Goal: Patient's capacity to carry out activities will improve  Outcome: Progressing  Goal: Spasticity will be prevented or improved  Outcome: Progressing  Goal: Subluxation will be prevented or improved  Outcome: Progressing     Problem: Self Care  Goal: Patient will have the ability to perform ADLs independently or with assistance (bathe,  groom, dress, toilet and feed)  Outcome: Progressing     Problem: Knowledge Deficit - Standard  Goal: Patient and family/care givers will demonstrate understanding of plan of care, disease process/condition, diagnostic tests and medications  Outcome: Progressing     Problem: Skin Integrity  Goal: Skin integrity is maintained or improved  Outcome: Progressing     Problem: Fall Risk  Goal: Patient will remain free from falls  Outcome: Progressing     Problem: Pain - Standard  Goal: Alleviation of pain or a reduction in pain to the patient’s comfort goal  Outcome: Progressing   The patient is Stable - Low risk of patient condition declining or worsening    Shift Goals  Clinical Goals: improved mobility, safety  Patient Goals: improved mobility, safety    Progress made toward(s) clinical / shift goals:  yes    Patient is not progressing towards the following goals:

## 2022-06-19 NOTE — CARE PLAN
The patient is Stable - Low risk of patient condition declining or worsening    Shift Goals  Clinical Goals: mobility, discharge planning  Patient Goals: mobilization    Progress made toward(s) clinical / shift goals:  yes    Patient is not progressing towards the following goals:

## 2022-06-19 NOTE — PROGRESS NOTES
[facility] Medicine Daily Progress Note    Date of Service  6/19/2022    Chief Complaint  Reji Madrigal Jr. is a 56 y.o. male admitted 6/3/2022 with stroke.    Hospital Course  Reji Madrigal Jr. is a 56 y.o. male who presented 6/3/2022 with right-sided weakness.  The patient has a history of hypertension diabetes and coronary artery disease was admitted to [facility] after presenting with right-sided weakness and was diagnosed with acute stroke he subsequently left [facility] as he was frustrated with waiting for rehab placement, and presented to our emergency department.  The patient reports that his right-sided weakness is improving.  He denies any headache.  His dysarthria is improved and is tolerating his diet.  No fever or chills.  The patient had a work-up for stroke at the outlying facility with noncontrast MRI revealing 2 small areas of acute infarct in the left corona radiata and previous right thalamic infarction. Patient is neurologically stable, pending SNF placement.    Interval Problem Update  Stroke-feels about the same today, Ambulating with FWW and nursing staff, 10 laps per day, doing modified sit ups and push ups in his bed. Still has profound weakness of the R side, especially the hand and fingers with fine motor skills. Denies any new symptoms. BP is ok. Feels that his speech is improving.    I have personally seen and examined the patient at bedside. I discussed the plan of care with patient.    Consultants/Specialty  Physiatry    Code Status  Full Code    Disposition  Patient is medically cleared for discharge.   Anticipate discharge to to skilled nursing facility.  I have placed the appropriate orders for post-discharge needs.    Review of Systems  Review of Systems   Constitutional: Negative for fever.   Respiratory: Negative for shortness of breath.    Cardiovascular: Negative for leg swelling.   Gastrointestinal: Negative for abdominal pain.   Genitourinary: Negative  for urgency.   Musculoskeletal: Negative for back pain.   Skin: Negative for rash.   Neurological: Positive for speech change (mild, but improving) and focal weakness. Negative for dizziness, sensory change and headaches.   Psychiatric/Behavioral: Negative for substance abuse. The patient does not have insomnia.    All other systems reviewed and are negative.       Physical Exam  Temp:  [36.4 °C (97.6 °F)-36.6 °C (97.9 °F)] 36.6 °C (97.8 °F)  Pulse:  [61-72] 72  Resp:  [17-18] 18  BP: (108-159)/(57-89) 108/57  SpO2:  [92 %-99 %] 92 %    Physical Exam  Constitutional:       General: He is not in acute distress.     Appearance: He is not ill-appearing.      Comments: Laying in bed, prone position   HENT:      Head: Normocephalic and atraumatic.      Right Ear: External ear normal.      Left Ear: External ear normal.      Mouth/Throat:      Pharynx: No oropharyngeal exudate or posterior oropharyngeal erythema.      Comments: Very mild dysarthria noted  Eyes:      Extraocular Movements: Extraocular movements intact.      Pupils: Pupils are equal, round, and reactive to light.   Cardiovascular:      Rate and Rhythm: Normal rate and regular rhythm.      Pulses: Normal pulses.      Heart sounds: Normal heart sounds.   Pulmonary:      Effort: Pulmonary effort is normal.      Breath sounds: Normal breath sounds. No wheezing.   Abdominal:      General: Bowel sounds are normal.      Palpations: Abdomen is soft.      Tenderness: There is no abdominal tenderness.   Musculoskeletal:         General: No swelling or tenderness.      Cervical back: Normal range of motion and neck supple.      Right lower leg: No edema.      Left lower leg: No edema.   Skin:     General: Skin is warm and dry.   Neurological:      General: No focal deficit present.      Mental Status: He is oriented to person, place, and time.      Cranial Nerves: No cranial nerve deficit.      Sensory: No sensory deficit.      Motor: Weakness present.      Comments:  Right hand weakness, gait instability  RLE weakness  RLE>RUE   Psychiatric:         Mood and Affect: Mood normal.         Behavior: Behavior normal.         Fluids    Intake/Output Summary (Last 24 hours) at 6/19/2022 1318  Last data filed at 6/19/2022 0309  Gross per 24 hour   Intake 240 ml   Output 900 ml   Net -660 ml       Laboratory                        Imaging  No orders to display        Assessment/Plan  * CVA (cerebral vascular accident) (HCC)- (present on admission)  Assessment & Plan  Continue aspirin Plavix (plavix can be stopped 21 days after event, so around 6/24)  Continue atorvastatin  Blood pressure control  See above  Highly motivated individual    Type 2 diabetes mellitus with hyperglycemia, with long-term current use of insulin ([facility])- (present on admission)  Assessment & Plan  Lantus 15 units QAM  continue sliding scale insulin monitor CBGs  Serum BS's are decently controlled a tthis time    Primary hypertension- (present on admission)  Assessment & Plan  BP is decently controlled  Losartan, coreg, norvasc  Monitor, adjust PRN    Hemiparesis affecting right side as late effect of stroke ([facility])- (present on admission)  Assessment & Plan  Continue aspirin and statin  RUE is worse than RLE (especially the hand), is R hand dominant  Blood pressure control  Re-evaluate with therapies on 6/20, IRF versus SNF       VTE prophylaxis: enoxaparin ppx    I have performed a physical exam and reviewed and updated ROS and Plan today (6/19/2022). In review of yesterday's note (6/18/2022), there are no changes except as documented above.

## 2022-06-20 ENCOUNTER — HOSPITAL ENCOUNTER (INPATIENT)
Facility: REHABILITATION | Age: 57
LOS: 9 days | DRG: 057 | End: 2022-06-29
Attending: PHYSICAL MEDICINE & REHABILITATION | Admitting: PHYSICAL MEDICINE & REHABILITATION
Payer: COMMERCIAL

## 2022-06-20 VITALS
HEIGHT: 71 IN | SYSTOLIC BLOOD PRESSURE: 127 MMHG | RESPIRATION RATE: 16 BRPM | TEMPERATURE: 98.2 F | HEART RATE: 60 BPM | BODY MASS INDEX: 27.01 KG/M2 | OXYGEN SATURATION: 97 % | WEIGHT: 192.9 LBS | DIASTOLIC BLOOD PRESSURE: 79 MMHG

## 2022-06-20 DIAGNOSIS — Z79.4 TYPE 2 DIABETES MELLITUS WITH HYPERGLYCEMIA, WITH LONG-TERM CURRENT USE OF INSULIN (HCC): ICD-10-CM

## 2022-06-20 DIAGNOSIS — I63.9 CEREBROVASCULAR ACCIDENT (CVA), UNSPECIFIED MECHANISM (HCC): ICD-10-CM

## 2022-06-20 DIAGNOSIS — E55.9 VITAMIN D DEFICIENCY: ICD-10-CM

## 2022-06-20 DIAGNOSIS — E11.65 TYPE 2 DIABETES MELLITUS WITH HYPERGLYCEMIA, WITH LONG-TERM CURRENT USE OF INSULIN (HCC): ICD-10-CM

## 2022-06-20 DIAGNOSIS — I10 PRIMARY HYPERTENSION: ICD-10-CM

## 2022-06-20 PROBLEM — I25.10 CORONARY ARTERY DISEASE INVOLVING NATIVE CORONARY ARTERY OF NATIVE HEART WITHOUT ANGINA PECTORIS: Status: ACTIVE | Noted: 2022-06-20

## 2022-06-20 PROBLEM — E11.42 DIABETIC PERIPHERAL NEUROPATHY (HCC): Status: ACTIVE | Noted: 2022-06-20

## 2022-06-20 PROBLEM — I48.0 PAROXYSMAL ATRIAL FIBRILLATION (HCC): Status: ACTIVE | Noted: 2022-06-20

## 2022-06-20 PROBLEM — Z74.09 IMPAIRED MOBILITY AND ADLS: Status: ACTIVE | Noted: 2022-06-20

## 2022-06-20 PROBLEM — E78.49 OTHER HYPERLIPIDEMIA: Status: ACTIVE | Noted: 2022-06-20

## 2022-06-20 PROBLEM — Z78.9 IMPAIRED MOBILITY AND ADLS: Status: ACTIVE | Noted: 2022-06-20

## 2022-06-20 LAB
FLUAV RNA SPEC QL NAA+PROBE: NEGATIVE
FLUBV RNA SPEC QL NAA+PROBE: NEGATIVE
GLUCOSE BLD STRIP.AUTO-MCNC: 102 MG/DL (ref 65–99)
GLUCOSE BLD STRIP.AUTO-MCNC: 137 MG/DL (ref 65–99)
GLUCOSE BLD STRIP.AUTO-MCNC: 167 MG/DL (ref 65–99)
GLUCOSE BLD STRIP.AUTO-MCNC: 203 MG/DL (ref 65–99)
RSV RNA SPEC QL NAA+PROBE: NEGATIVE
SARS-COV+SARS-COV-2 AG RESP QL IA.RAPID: NOTDETECTED
SARS-COV-2 RNA RESP QL NAA+PROBE: NOTDETECTED
SPECIMEN SOURCE: NORMAL
SPECIMEN SOURCE: NORMAL

## 2022-06-20 PROCEDURE — A9270 NON-COVERED ITEM OR SERVICE: HCPCS | Performed by: PHYSICAL MEDICINE & REHABILITATION

## 2022-06-20 PROCEDURE — A9270 NON-COVERED ITEM OR SERVICE: HCPCS | Performed by: HOSPITALIST

## 2022-06-20 PROCEDURE — 87426 SARSCOV CORONAVIRUS AG IA: CPT

## 2022-06-20 PROCEDURE — 700102 HCHG RX REV CODE 250 W/ 637 OVERRIDE(OP): Performed by: HOSPITALIST

## 2022-06-20 PROCEDURE — G0378 HOSPITAL OBSERVATION PER HR: HCPCS

## 2022-06-20 PROCEDURE — 700111 HCHG RX REV CODE 636 W/ 250 OVERRIDE (IP): Performed by: PHYSICAL MEDICINE & REHABILITATION

## 2022-06-20 PROCEDURE — 99217 PR OBSERVATION CARE DISCHARGE: CPT | Performed by: INTERNAL MEDICINE

## 2022-06-20 PROCEDURE — 82962 GLUCOSE BLOOD TEST: CPT

## 2022-06-20 PROCEDURE — 0241U HCHG SARS-COV-2 COVID-19 NFCT DS RESP RNA 4 TRGT MIC: CPT

## 2022-06-20 PROCEDURE — 82962 GLUCOSE BLOOD TEST: CPT | Mod: 91

## 2022-06-20 PROCEDURE — 700102 HCHG RX REV CODE 250 W/ 637 OVERRIDE(OP): Performed by: PHYSICAL MEDICINE & REHABILITATION

## 2022-06-20 PROCEDURE — 96372 THER/PROPH/DIAG INJ SC/IM: CPT

## 2022-06-20 PROCEDURE — 99223 1ST HOSP IP/OBS HIGH 75: CPT | Performed by: PHYSICAL MEDICINE & REHABILITATION

## 2022-06-20 PROCEDURE — 770010 HCHG ROOM/CARE - REHAB SEMI PRIVAT*

## 2022-06-20 PROCEDURE — 94760 N-INVAS EAR/PLS OXIMETRY 1: CPT

## 2022-06-20 RX ORDER — ONDANSETRON 2 MG/ML
4 INJECTION INTRAMUSCULAR; INTRAVENOUS 4 TIMES DAILY PRN
Status: DISCONTINUED | OUTPATIENT
Start: 2022-06-20 | End: 2022-06-29 | Stop reason: HOSPADM

## 2022-06-20 RX ORDER — ONDANSETRON 4 MG/1
4 TABLET, ORALLY DISINTEGRATING ORAL 4 TIMES DAILY PRN
Status: DISCONTINUED | OUTPATIENT
Start: 2022-06-20 | End: 2022-06-29 | Stop reason: HOSPADM

## 2022-06-20 RX ORDER — AMOXICILLIN 250 MG
2 CAPSULE ORAL 2 TIMES DAILY
Status: DISCONTINUED | OUTPATIENT
Start: 2022-06-20 | End: 2022-06-29 | Stop reason: HOSPADM

## 2022-06-20 RX ORDER — BISACODYL 10 MG
10 SUPPOSITORY, RECTAL RECTAL
Status: DISCONTINUED | OUTPATIENT
Start: 2022-06-20 | End: 2022-06-29 | Stop reason: HOSPADM

## 2022-06-20 RX ORDER — BISACODYL 10 MG
10 SUPPOSITORY, RECTAL RECTAL
Status: CANCELLED | OUTPATIENT
Start: 2022-06-20

## 2022-06-20 RX ORDER — ATORVASTATIN CALCIUM 40 MG/1
80 TABLET, FILM COATED ORAL EVERY EVENING
Status: CANCELLED | OUTPATIENT
Start: 2022-06-20

## 2022-06-20 RX ORDER — CLOPIDOGREL BISULFATE 75 MG/1
75 TABLET ORAL DAILY
Status: CANCELLED | OUTPATIENT
Start: 2022-06-21

## 2022-06-20 RX ORDER — CARVEDILOL 25 MG/1
25 TABLET ORAL 2 TIMES DAILY WITH MEALS
Status: CANCELLED | OUTPATIENT
Start: 2022-06-20

## 2022-06-20 RX ORDER — AMLODIPINE BESYLATE 10 MG/1
10 TABLET ORAL DAILY
Qty: 30 TABLET | Refills: 1 | Status: ON HOLD
Start: 2022-06-21 | End: 2022-06-28

## 2022-06-20 RX ORDER — CARVEDILOL 12.5 MG/1
25 TABLET ORAL 2 TIMES DAILY WITH MEALS
Status: DISCONTINUED | OUTPATIENT
Start: 2022-06-20 | End: 2022-06-29 | Stop reason: HOSPADM

## 2022-06-20 RX ORDER — POLYETHYLENE GLYCOL 3350 17 G/17G
1 POWDER, FOR SOLUTION ORAL
Status: CANCELLED | OUTPATIENT
Start: 2022-06-20

## 2022-06-20 RX ORDER — LANOLIN ALCOHOL/MO/W.PET/CERES
3 CREAM (GRAM) TOPICAL NIGHTLY PRN
Status: DISCONTINUED | OUTPATIENT
Start: 2022-06-20 | End: 2022-06-29 | Stop reason: HOSPADM

## 2022-06-20 RX ORDER — ACETAMINOPHEN 325 MG/1
650 TABLET ORAL EVERY 4 HOURS PRN
Status: DISCONTINUED | OUTPATIENT
Start: 2022-06-20 | End: 2022-06-29 | Stop reason: HOSPADM

## 2022-06-20 RX ORDER — ATORVASTATIN CALCIUM 40 MG/1
80 TABLET, FILM COATED ORAL EVERY EVENING
Status: DISCONTINUED | OUTPATIENT
Start: 2022-06-20 | End: 2022-06-29 | Stop reason: HOSPADM

## 2022-06-20 RX ORDER — LACTULOSE 20 G/30ML
30 SOLUTION ORAL
Status: DISCONTINUED | OUTPATIENT
Start: 2022-06-20 | End: 2022-06-29 | Stop reason: HOSPADM

## 2022-06-20 RX ORDER — POLYVINYL ALCOHOL 14 MG/ML
1 SOLUTION/ DROPS OPHTHALMIC PRN
Status: DISCONTINUED | OUTPATIENT
Start: 2022-06-20 | End: 2022-06-29 | Stop reason: HOSPADM

## 2022-06-20 RX ORDER — ENOXAPARIN SODIUM 100 MG/ML
40 INJECTION SUBCUTANEOUS DAILY
Status: CANCELLED | OUTPATIENT
Start: 2022-06-20

## 2022-06-20 RX ORDER — CLOPIDOGREL BISULFATE 75 MG/1
75 TABLET ORAL DAILY
Status: DISCONTINUED | OUTPATIENT
Start: 2022-06-21 | End: 2022-06-29 | Stop reason: HOSPADM

## 2022-06-20 RX ORDER — AMLODIPINE BESYLATE 5 MG/1
10 TABLET ORAL
Status: DISCONTINUED | OUTPATIENT
Start: 2022-06-21 | End: 2022-06-29 | Stop reason: HOSPADM

## 2022-06-20 RX ORDER — ENOXAPARIN SODIUM 100 MG/ML
40 INJECTION SUBCUTANEOUS DAILY
Status: DISCONTINUED | OUTPATIENT
Start: 2022-06-20 | End: 2022-06-29 | Stop reason: HOSPADM

## 2022-06-20 RX ORDER — LOSARTAN POTASSIUM 50 MG/1
100 TABLET ORAL
Status: CANCELLED | OUTPATIENT
Start: 2022-06-21

## 2022-06-20 RX ORDER — CLOPIDOGREL BISULFATE 75 MG/1
75 TABLET ORAL DAILY
Qty: 3 TABLET | Refills: 0 | Status: ON HOLD
Start: 2022-06-21 | End: 2022-06-28

## 2022-06-20 RX ORDER — LOSARTAN POTASSIUM 25 MG/1
100 TABLET ORAL
Status: DISCONTINUED | OUTPATIENT
Start: 2022-06-21 | End: 2022-06-29 | Stop reason: HOSPADM

## 2022-06-20 RX ORDER — HYDROXYZINE HYDROCHLORIDE 25 MG/1
50 TABLET, FILM COATED ORAL EVERY 6 HOURS PRN
Status: DISCONTINUED | OUTPATIENT
Start: 2022-06-20 | End: 2022-06-29 | Stop reason: HOSPADM

## 2022-06-20 RX ORDER — ECHINACEA PURPUREA EXTRACT 125 MG
2 TABLET ORAL PRN
Status: DISCONTINUED | OUTPATIENT
Start: 2022-06-20 | End: 2022-06-29 | Stop reason: HOSPADM

## 2022-06-20 RX ORDER — HYDRALAZINE HYDROCHLORIDE 10 MG/1
10 TABLET, FILM COATED ORAL EVERY 8 HOURS PRN
Status: DISCONTINUED | OUTPATIENT
Start: 2022-06-20 | End: 2022-06-29 | Stop reason: HOSPADM

## 2022-06-20 RX ORDER — AMOXICILLIN 250 MG
2 CAPSULE ORAL 2 TIMES DAILY
Status: CANCELLED | OUTPATIENT
Start: 2022-06-20

## 2022-06-20 RX ORDER — POLYETHYLENE GLYCOL 3350 17 G/17G
1 POWDER, FOR SOLUTION ORAL
Status: DISCONTINUED | OUTPATIENT
Start: 2022-06-20 | End: 2022-06-29 | Stop reason: HOSPADM

## 2022-06-20 RX ORDER — AMLODIPINE BESYLATE 10 MG/1
10 TABLET ORAL
Status: CANCELLED | OUTPATIENT
Start: 2022-06-21

## 2022-06-20 RX ORDER — ASPIRIN 81 MG/1
81 TABLET ORAL DAILY
Qty: 30 TABLET | Refills: 1 | Status: ON HOLD
Start: 2022-06-21 | End: 2022-06-28 | Stop reason: SDUPTHER

## 2022-06-20 RX ORDER — CARVEDILOL 25 MG/1
25 TABLET ORAL 2 TIMES DAILY WITH MEALS
Qty: 60 TABLET | Refills: 1 | Status: ON HOLD
Start: 2022-06-20 | End: 2022-06-28

## 2022-06-20 RX ORDER — LOSARTAN POTASSIUM 100 MG/1
100 TABLET ORAL DAILY
Qty: 30 TABLET | Refills: 1 | Status: ON HOLD
Start: 2022-06-21 | End: 2022-06-28

## 2022-06-20 RX ORDER — PSEUDOEPHEDRINE HCL 30 MG
100 TABLET ORAL 2 TIMES DAILY
Qty: 60 CAPSULE | Refills: 1 | Status: ON HOLD
Start: 2022-06-20 | End: 2022-06-28

## 2022-06-20 RX ORDER — TRAZODONE HYDROCHLORIDE 50 MG/1
50 TABLET ORAL
Status: DISCONTINUED | OUTPATIENT
Start: 2022-06-20 | End: 2022-06-29 | Stop reason: HOSPADM

## 2022-06-20 RX ORDER — ALUMINA, MAGNESIA, AND SIMETHICONE 2400; 2400; 240 MG/30ML; MG/30ML; MG/30ML
20 SUSPENSION ORAL
Status: DISCONTINUED | OUTPATIENT
Start: 2022-06-20 | End: 2022-06-29 | Stop reason: HOSPADM

## 2022-06-20 RX ORDER — ATORVASTATIN CALCIUM 80 MG/1
80 TABLET, FILM COATED ORAL EVERY EVENING
Qty: 30 TABLET | Refills: 1 | Status: ON HOLD
Start: 2022-06-20 | End: 2022-06-28 | Stop reason: SDUPTHER

## 2022-06-20 RX ADMIN — ASPIRIN 81 MG: 81 TABLET, COATED ORAL at 05:47

## 2022-06-20 RX ADMIN — INSULIN HUMAN 6 UNITS: 100 INJECTION, SOLUTION PARENTERAL at 16:58

## 2022-06-20 RX ADMIN — CLOPIDOGREL BISULFATE 75 MG: 75 TABLET ORAL at 05:48

## 2022-06-20 RX ADMIN — LOSARTAN POTASSIUM 100 MG: 50 TABLET, FILM COATED ORAL at 05:48

## 2022-06-20 RX ADMIN — ENOXAPARIN SODIUM 40 MG: 40 INJECTION SUBCUTANEOUS at 17:09

## 2022-06-20 RX ADMIN — SENNOSIDES AND DOCUSATE SODIUM 2 TABLET: 50; 8.6 TABLET ORAL at 21:52

## 2022-06-20 RX ADMIN — AMLODIPINE BESYLATE 10 MG: 5 TABLET ORAL at 05:48

## 2022-06-20 RX ADMIN — CARVEDILOL 25 MG: 25 TABLET, FILM COATED ORAL at 08:09

## 2022-06-20 RX ADMIN — CARVEDILOL 25 MG: 12.5 TABLET, FILM COATED ORAL at 17:09

## 2022-06-20 RX ADMIN — INSULIN GLARGINE-YFGN 15 UNITS: 100 INJECTION, SOLUTION SUBCUTANEOUS at 08:41

## 2022-06-20 RX ADMIN — ATORVASTATIN CALCIUM 80 MG: 40 TABLET, FILM COATED ORAL at 21:52

## 2022-06-20 ASSESSMENT — LIFESTYLE VARIABLES
EVER FELT BAD OR GUILTY ABOUT YOUR DRINKING: NO
EVER HAD A DRINK FIRST THING IN THE MORNING TO STEADY YOUR NERVES TO GET RID OF A HANGOVER: NO
TOTAL SCORE: 0
CONSUMPTION TOTAL: NEGATIVE
TOTAL SCORE: 0
HAVE PEOPLE ANNOYED YOU BY CRITICIZING YOUR DRINKING: NO
EVER_SMOKED: NEVER
TOTAL SCORE: 0
HAVE YOU EVER FELT YOU SHOULD CUT DOWN ON YOUR DRINKING: NO
HOW MANY TIMES IN THE PAST YEAR HAVE YOU HAD 5 OR MORE DRINKS IN A DAY: 0
ON A TYPICAL DAY WHEN YOU DRINK ALCOHOL HOW MANY DRINKS DO YOU HAVE: 0
AVERAGE NUMBER OF DAYS PER WEEK YOU HAVE A DRINK CONTAINING ALCOHOL: 0
ALCOHOL_USE: YES

## 2022-06-20 ASSESSMENT — FIBROSIS 4 INDEX: FIB4 SCORE: 0.94

## 2022-06-20 ASSESSMENT — PATIENT HEALTH QUESTIONNAIRE - PHQ9
SUM OF ALL RESPONSES TO PHQ9 QUESTIONS 1 AND 2: 0
1. LITTLE INTEREST OR PLEASURE IN DOING THINGS: NOT AT ALL
2. FEELING DOWN, DEPRESSED, IRRITABLE, OR HOPELESS: NOT AT ALL

## 2022-06-20 ASSESSMENT — PAIN DESCRIPTION - PAIN TYPE: TYPE: ACUTE PAIN

## 2022-06-20 NOTE — FLOWSHEET NOTE
06/20/22 1411   Patient History   Pulmonary Diagnosis None   Procedures Relevant to Respiratory Status none   Home O2 No   Nocturnal CPAP No   Home Treatments/Frequency No   Sleep Apnea Screening   Have you had a sleep study? No   Have you been diagnosed with sleep apnea? No

## 2022-06-20 NOTE — PROGRESS NOTES
1025: Attempted to call report to [facility], but patient hasn't been assigned to a nurse yet.  Left name and number for call back.    Covid swab sent.    1054: Report given to RENAY Chacon at [facility].

## 2022-06-20 NOTE — DISCHARGE INSTRUCTIONS
Discharge Instructions    Discharged to other by [facility] with escort. Discharged via wheelchair, hospital escort: Yes.  Special equipment needed: Not Applicable    Be sure to schedule a follow-up appointment with your primary care doctor or any specialists as instructed.     Discharge Plan:   Diet Plan: Discussed  Activity Level: Discussed  Confirmed Follow up Appointment: Appointment Scheduled  Confirmed Symptoms Management: Discussed  Medication Reconciliation Updated: Yes    I understand that a diet low in cholesterol, fat, and sodium is recommended for good health. Unless I have been given specific instructions below for another diet, I accept this instruction as my diet prescription.   Other diet: Diabetic      Special Instructions: None    Is patient discharged on Warfarin / Coumadin?   No     Depression / Suicide Risk    As you are discharged from this [facility], it is important to learn how to keep safe from harming yourself.    Recognize the warning signs:  Abrupt changes in personality, positive or negative- including increase in energy   Giving away possessions  Change in eating patterns- significant weight changes-  positive or negative  Change in sleeping patterns- unable to sleep or sleeping all the time   Unwillingness or inability to communicate  Depression  Unusual sadness, discouragement and loneliness  Talk of wanting to die  Neglect of personal appearance   Rebelliousness- reckless behavior  Withdrawal from people/activities they love  Confusion- inability to concentrate     If you or a loved one observes any of these behaviors or has concerns about self-harm, here's what you can do:  Talk about it- your feelings and reasons for harming yourself  Remove any means that you might use to hurt yourself (examples: pills, rope, extension cords, firearm)  Get professional help from the community (Mental Health, Substance Abuse, psychological counseling)  Do not be alone:Call your Safe  Contact- someone whom you trust who will be there for you.  Call your local CRISIS HOTLINE 891-3517 or 982-463-1709  Call your local Children's Mobile Crisis Response Team [place] (825) 502-3192 or www.DesignLine  Call the toll free National Suicide Prevention Hotlines   National Suicide Prevention Lifeline 348-701-MQGV (7373)  [employer] Line Network 800-SUICIDE (446-4224)

## 2022-06-20 NOTE — DISCHARGE PLANNING
Case Management Discharge Planning    Admission Date: 6/3/2022  GMLOS:    ALOS: 0    6-Clicks ADL Score: 18  6-Clicks Mobility Score: 18      Anticipated Discharge Dispo: Discharge Disposition: Disch to  rehab facility or distinct part unit (62)    DME Needed: No    Action(s) Taken: Updated Provider/Nurse on Discharge Plan    Escalations Completed: None    Medically Clear: Yes    Next Steps: Patient discussed in IDT rounds. [facility] with transport scheduled. Patient in agreement with DC plan.    Barriers to Discharge: None    Is the patient up for discharge tomorrow: No, DC today.

## 2022-06-20 NOTE — PROGRESS NOTES
Alert and oriented x4. Offered fluids. Safety precautions in placed. Bed in lowest position. Upper side rails up. Treaded socks on. Reinforced the use of call light when needing assistance. Bed alarm is on.

## 2022-06-20 NOTE — CARE PLAN
The patient is Stable - Low risk of patient condition declining or worsening    Shift Goals  Clinical Goals: mobility  Patient Goals: pt will be able to tolerate ambulating around the hallway without injury or fall    Progress made toward(s) clinical / shift goals:      Patient is not progressing towards the following goals:

## 2022-06-20 NOTE — H&P
"REHABILITATION HISTORY AND PHYSICAL/POST ADMISSION EVALUATION    6/20/2022  2:49 PM  Reji Madrigal Jr.  RH04/02  Admission  6/20/2022 12:18 PM  [facility] Code/Reason for admission: 0001.2 - Stroke: Right Body Involvement (Left Brain)   Etiologic diagnosis/problem: Ischemic stroke (HCC)  Chief Complaint: Right hand weakness    HPI:  Per Dr. Delgadillo's original consult \" The patient is a 56 y.o. right hand dominant male with a past medical history of diabetes, hypertension, stroke 5 days prior to presentation;  who presented on 6/3/2022 10:12 PM after leaving AMA from [facility].  Patient was unhappy with his medical care, wanted to come to [facility].     Paper records received from [facility] reflect 2 areas of acute infarction in the left corona radiata, initial NIH score 6.  Patient was admitted, started on aspirin 325 mg daily, Plavix 75 mg daily for 3 weeks, and atorvastatin 80 mg for secondary stroke prevention.\"    Patient current reports slowly improving right-sided weakness however his hand is still quite impaired and he is right-hand dominant.  In addition to be done history patient also has diabetic peripheral neuropathy and uses insulin at home.  Patient also reports a history of intermittent atrial fibrillation and this is confirmed via chart review.  Patient also has a history of myocardial infarction for which he has 2 stents placed.  Per review of records he was previously on lisinopril and metoprolol.    Patient was evaluated by Rehab Medicine physician and Physical Therapy, Occupational Therapy and Speech Therapy and determined to be appropriate for acute inpatient rehab and was transferred to [facility] on 6/20/2022 12:18 PM.    With this acute therapeutic intervention, this patient hopes to improve his functional status, and return to independent living with the supportive care of family.    REVIEW OF SYSTEMS:     A complete review of " systems was performed and was negative in detail with the exception of items mentioned elsewhere in this document.    PMH:  Past Medical History:   Diagnosis Date   • Diabetes (HCC)    • Hypertension    • Stroke (HCC)        PSH:  No past surgical history on file.    No family history on file.     MEDICATIONS:  Current Facility-Administered Medications   Medication Dose   • hydrOXYzine HCl (ATARAX) tablet 50 mg  50 mg   • melatonin tablet 3 mg  3 mg   • Respiratory Therapy Consult     • Pharmacy Consult Request ...Pain Management Review 1 Each  1 Each   • hydrALAZINE (APRESOLINE) tablet 10 mg  10 mg   • acetaminophen (Tylenol) tablet 650 mg  650 mg   • lactulose 20 GM/30ML solution 30 mL  30 mL   • artificial tears ophthalmic solution 1 Drop  1 Drop   • benzocaine-menthol (Cepacol) lozenge 1 Lozenge  1 Lozenge   • mag hydrox-al hydrox-simeth (MAALOX PLUS ES or MYLANTA DS) suspension 20 mL  20 mL   • ondansetron (ZOFRAN ODT) dispertab 4 mg  4 mg    Or   • ondansetron (ZOFRAN) syringe/vial injection 4 mg  4 mg   • traZODone (DESYREL) tablet 50 mg  50 mg   • sodium chloride (OCEAN) 0.65 % nasal spray 2 Spray  2 Spray   • [START ON 6/21/2022] amLODIPine (NORVASC) tablet 10 mg  10 mg   • [START ON 6/21/2022] aspirin EC (ECOTRIN) tablet 81 mg  81 mg   • atorvastatin (LIPITOR) tablet 80 mg  80 mg   • carvedilol (COREG) tablet 25 mg  25 mg   • [START ON 6/21/2022] clopidogrel (PLAVIX) tablet 75 mg  75 mg   • enoxaparin (Lovenox) inj 40 mg  40 mg   • [START ON 6/21/2022] insulin GLARGINE (Lantus,Semglee) injection  15 Units   • [START ON 6/21/2022] losartan (COZAAR) tablet 100 mg  100 mg   • senna-docusate (PERICOLACE or SENOKOT S) 8.6-50 MG per tablet 2 Tablet  2 Tablet    And   • polyethylene glycol/lytes (MIRALAX) PACKET 1 Packet  1 Packet    And   • magnesium hydroxide (MILK OF MAGNESIA) suspension 30 mL  30 mL    And   • bisacodyl (DULCOLAX) suppository 10 mg  10 mg   • insulin regular (HumuLIN R,NovoLIN R) injection   "3-15 Units       ALLERGIES:  Penicillins    PSYCHOSOCIAL HISTORY:  Per Dr. Villalobos \"In regards to support, patient has no friends or family locally that he could stay with.  Patient's previously reported girlfriend in [place] is not an option for him at this time.  Patient has an ex-wife in [place] that is not an option. Patient has several children in the [place] area, of which his daughter could be an option for discharge support but is not a robust option. Patient currently lives in an apartment with lease expiring in 5 days and is no other place to live. 6/17: Patient's hospital course has been uncomplicated, however patient's disposition and discharge plan has remained uncertain.  Latest update as of 6/16 is that patient can now go to [place] with his girlfriend.\"    LEVEL OF FUNCTION PRIOR TO DISABILTY:  Independent    LEVEL OF FUNCTION PRIOR TO ADMISSION to [facility]:  PT:     06/09/22 1650   Cognition    Cognition / Consciousness X   Level of Consciousness Alert   Safety Awareness Impaired   New Learning Impaired   Attention Impaired   Comments cooperative; frustrated with overall care; pt on phone each time this therapist entered and exited room, reporting he tried to not be busy when this therpaist came; dry sense of humor   Passive ROM Lower Body   Passive ROM Lower Body WDL   Balance   Sitting Balance (Static) Fair +   Sitting Balance (Dynamic) Fair +   Standing Balance (Static) Fair   Standing Balance (Dynamic) Fair -   Weight Shift Sitting Good   Weight Shift Standing Fair   Skilled Intervention Postural Facilitation;Sequencing;Tactile Cuing;Verbal Cuing   Comments B Ue support in sitting/standing; no loss of blaance but needs cues to attend to task and keep right hand on walker; wokred on standing statcially with eyes open, closed and reaching tasks   Gait Analysis   Gait Level Of Assist Minimal Assist   Assistive Device Front Wheel Walker   Distance (Feet) 40   # of " "Times Distance was Traveled 1   Deviation Step To   Weight Bearing Status full   Comments distance limited by pt inattention, focusing on other tasks from gait   Bed Mobility    Supine to Sit Minimal Assist  (with railing, HOB rasied)   Functional Mobility   Sit to Stand Contact Guard Assist   Bed, Chair, Wheelchair Transfer Contact Guard Assist         OT:     06/15/22 0920   Cognition    Comments poor carryover of stroke education related to recovery process   Hand Strengthening   Comment focused therex on R hand/wrist with weight bearing and compensatory strategies. Patient verbalized frustration t/o session about R hand not functional   Activities of Daily Living   Eating    (dislikes built up gripper)   Grooming Supervision;Seated  (no R UE engagement)   Bathing Minimal Assist  (declined R UE engagement when prompted)   Upper Body Dressing Minimal Assist   Lower Body Dressing Minimal Assist   Toileting Minimal Assist  (standing with FWW)   Functional Mobility   Toilet Transfers Contact Guard Assist   Mobility FWW           SLP:    No dysphagia    CURRENT LEVEL OF FUNCTION:   Same as level of function prior to admission to [facility]    PHYSICAL EXAM:     VITAL SIGNS:   height is 1.803 m (5' 11\") and weight is 88.5 kg (195 lb). His temporal temperature is 37 °C (98.6 °F). His blood pressure is 106/73 and his pulse is 74. His respiration is 18 and oxygen saturation is 97%.     GENERAL: No apparent distress  HEENT: Normocephalic/atraumatic, EOMI, PERRL and No nystagmus, moist mucous membranes  CARDIAC: Regular rate and rhythm, normal S1, S2, no murmurs, no peripheral edema   LUNGS: Clear to auscultation, normal respiratory effort, on room air   ABDOMINAL: bowel sounds present, soft, nontender and nondistended    EXTREMITIES: no edema  MSK: No joint swelling    NEURO:    Mental status:  A&Ox4 (person, place, date, situation) answers questions appropriately follows commands  Speech: fluent, no " aphasia or dysarthria    CRANIAL NERVES:  2,3: visual acuity grossly intact, PERRL  3,4,6: EOMI bilaterally, no nystagmus or diplopia  5: intact in all branches  7: Mild decrease in right nasolabial fold  8: hearing grossly intact  9,10: symmetric palate elevation  11: SCM/Trapezius strength 5/5 bilaterally  12: tongue protrudes midline    Motor:  Shoulder flexors:  Right -  4/5, Left -  5/5  Elbow flexors:  Right -  4/5, Left -  5/5  Elbow extensors:  Right -  4/5, Left -  5/5  Very weak  on the right  Hip flexors:  Right -  4+/5, Left -  5/5  Knee ext:  Right -  5/5, Left -  5/5  Dorsiflexors:  Right -  5/5, Left -  5/5  Plantar flexors:  Right -  5/5, Left -  5/5     Sensory:   Decreased light touch and allodynic bilateral feet    DTRs:   3+ right upper and right lower extremity  2+ in left upper and left lower extremity  Negative Angel b/l       PRIMARY REHAB DIAGNOSIS:    This patient is a 56 y.o. male admitted for acute inpatient rehabilitation with Ischemic stroke (HCC).    IMPAIRMENTS:   Cognitive  ADLs/IADLs  Mobility    SECONDARY DIAGNOSIS/MEDICAL CO-MORBIDITIES AFFECTING FUNCTION:    Diabetes with hyperglycemia  Diabetic peripheral neuropathy  Hypertension  Paroxysmal atrial fibrillation  Coronary artery disease    RELEVANT CHANGES SINCE PREADMISSION EVALUATION:    Status unchanged    The patient's rehabilitation potential is excellent  The patient's medical prognosis is excellent    PLAN:   Discussion and Recommendations, discussed with the patient and/or family:   1. The patient requires an acute inpatient rehabilitation program with a coordinated program of care at an intensity and frequency not available at a lower level of care. This recommendation is substantiated by the patient's medical physicians who recommend that the patient's intervention and assessment of medical issues needs to be done at an acute level of care for patient's safety and maximum outcome.     2. A coordinated program of  care will be supplied by an interdisciplinary team of physical therapy, occupational therapy, rehab physician, rehab nursing, and, if needed, speech therapy and rehab psychology. Rehab team presents a patient-specific rehabilitation and education program concentrating on prevention of future problems related to accessibility, mobility, skin, bowel, bladder, sexuality, and psychosocial and medical/surgical problems.     3. Need for Rehabilitation Physician: The rehab physician will be evaluating the patient on a multi-weekly basis to help coordinate the program of care. The rehab physician communicates between medical physicians, therapists, and nurses to maximize the patient's potential outcome. Specific areas in which the rehab physician will be providing daily assessment include the following:   A. Assessing the patient's heart rate and blood pressure response (vitals monitoring) to activity and making adjustments in medications or conservative measures as needed.   B. The rehab physician will be assessing the frequency at which the program can be increased to allow the patient to reach optimal functional outcome.   C. The rehab physician will also provide assessments in daily skin care, especially in light of patient's impairments in mobility.   D. The rehab physician will provide special expertise in understanding how to work with functional impairment and recommend appropriate interventions, compensatory techniques, and education that will facilitate the patient's outcome.     4. Rehab R.N.   The rehab RN will be working with patient to carry over in room mobility and activities of daily living when the patient is not in 3 hours of skilled therapy. Rehab nursing will be working in conjunction with rehab physician to address all the medical issues above and continue to assess laboratory work and discuss abnormalities with the treating physicians, assess vitals, and response to activity, and discuss and report  abnormalities with the rehab physician. Rehab RN will also continue daily skin care, supervise bladder/bowel program, instruct in medication administration, and ensure patient safety.     5. Therapies to treat at intensity and frequency of (may change after completion of evaluation by all therapeutic disciplines):       PT:  Physical therapy to address mobility, transfer, gait training and evaluation for adaptive equipment needs 1hour/day at least 5 days/week for the duration of the ELOS (see below)       OT:  Occupational therapy to address ADLs, self-care, home management training, functional mobility/transfers and assistive device evaluation, and community re-integration 1hour/day at least 5 days/week for the duration of the ELOS (see below).        ST/Dysphagia:  Speech therapy to address speech, language, and cognitive deficits as well as swallowing difficulties with retraining/dysphagia management and community re-integration with comprehension, expression, cognitive training 1hour/day at least 5 days/week for the duration of the ELOS (see below).     6. Medical management / Rehabilitation Issues/Adverse Potential affecting function as part of rehabilitation plan.    Diabetes with hyperglycemia  Glargine  Sliding scale insulin  Consult hospitalist    Diabetic peripheral neuropathy  Monitor need for gabapentin    Hypertension  Losartan  Carvedilol  Per review of records previously was on metoprolol and lisinopril    Paroxysmal atrial fibrillation  Would benefit from a cardiac monitor, will discuss with patient    Coronary artery disease  Status post stents  Aspirin  Plavix    I performed a complete drug regimen review and did not identify any potential clinically significant medication issues.    The patient's CODE STATUS was confirmed as FULL CODE on admission, with the patient and/or family at bedside.    REHABILITATION ISSUES/ADVERSE POTENTIAL:  1.  CVA (Cerebrovascular Accident): Continue aspirin and Plavix  for 21 days for secondary prophylaxis as well as lipid and blood pressure management. Patient demonstrates functional deficits in strength, balance, coordination, and ADL's. Patient is admitted to [facility] for comprehensive rehabilitation therapy as described below.   Rehabilitation nursing monitors bowel and bladder control, educates on medication administration, co-morbidities and monitors patient safety.    2.  DVT prophylaxis:  Patient is on Lovenox for anticoagulation upon transfer. Encourage OOB. Monitor daily for signs and symptoms of DVT including but not limited to swelling and pain to prevent the development of DVT that may interfere with therapies.    3.  Pain: No issues with pain currently / Controlled with as needed oral analgesics.    4.  Nutrition/Dysphagia: Dietician monitors nutrient intake, recommend supplements prn and provide nutrition education to pt/family to promote optimal nutrition for wound healing/recovery.     5.  Bladder/bowel:  Start bowel and bladder program, to prevent constipation, urinary retention (which may lead to UTI), and urinary incontinence (which will impact upon pt's functional independence).   - TV Q3h while awake with post void bladder scans, I&O cath for PVRs >400  - up to commode after meal     6.  Skin/dermal ulcer prophylaxis: Monitor for new skin conditions with q.2 h. turns as required to prevent the development of skin breakdown.     7.  GI prophylaxis: Omeprazole to prevent gastritis or GI bleed that would interfere with therapies.    8. Respiratory therapy: RT performs O2 management prn, breathing retraining, pulmonary hygiene and bronchospasm management prn to optimize participation in therapies.    Pt was seen today for 71 min, and entire time spent in face-to-face contact was >50% in counseling and coordination of care as detailed in A/P above.        GOALS/EXPECTED LEVEL OF FUNCTION BASED ON CURRENT MEDICAL AND FUNCTIONAL STATUS (may  change based on patient's medical status and rate of impairment recovery):  Transfers:   Modified Independent  Mobility/Gait:   Modified Independent  ADL's:   Modified Independent  Cognition: Modified independent    DISPOSITION: Discharge to pre-morbid independent living setting with the supportive care of patient's family.      ELOS: 10-14 days    Aurora Gonzalez M.D.  Physical Medicine and Rehabilitation

## 2022-06-20 NOTE — FLOWSHEET NOTE
06/20/22 1412   Events/Summary/Plan   Events/Summary/Plan RT assessment   Vital Signs   Pulse 74   Respiration 18   Pulse Oximetry 97 %   $ Pulse Oximetry (Spot Check) Yes   Respiratory Assessment   Level of Consciousness Alert   Chest Exam   Work Of Breathing / Effort Within Normal Limits   Oxygen   O2 Delivery Device None - Room Air   Smoking History   Have you ever smoked Never

## 2022-06-20 NOTE — DISCHARGE PLANNING
0858: Received authorization from insurance, would appreciate updated therapy notes for physiatry to review. Sent PAS to Dr Gonzalez. TCC will follow.    1012: Dr Gonzalez has accepted, transport set for 1130 via [facility]. Notified attending, cm , and bedside nurse.    1030: Met with patient at bedside. Answered questions and explained current visitor/Covid policy. Patient reports no additional needs at this time.

## 2022-06-20 NOTE — DISCHARGE SUMMARY
Discharge Summary    CHIEF COMPLAINT ON ADMISSION  Chief Complaint   Patient presents with   • Other     Pt suffered a stroke on Monday 5/30 and was admitted to [facility] where he has remained in patient up until approximately an hour ago. Pt elected to leave AMA from their facility. Presents to us today with the hope that we can coordinate his transfer to a rehab facility.        Reason for Admission  Follow up     CODE STATUS  Full Code    HPI & HOSPITAL COURSE  This is a 56 y.o. male here with history of poorly controlled Type 2 DM and HTN who had presented to [facility] with an acute stroke causing R sided hemparesis and left AMA due to various issues. He came to our hospital with significant weakness and was admitted to the general medical floor. Blood pressure medications were adjusted. Hemiparesis is slowly improving but will still require aggressive rehab. He his now medically stable to transfer. He will need ongoing titration of his insulin regimen as well.  Of note, his plavix likely can be stopped on 6/24 given 21 day course after initial CVA event. ASA can remain on after that.  No notes on file    Therefore, he is discharged in good and stable condition to an inpatient rehabilitation hospital.    The patient met 2-midnight criteria for an inpatient stay at the time of discharge.      FOLLOW UP ITEMS POST DISCHARGE  F/U with stroke bridge clinic in 1 month  F/U with PCP in 1-2 weeks      DISCHARGE DIAGNOSES  Principal Problem:    CVA (cerebral vascular accident) (HCC) POA: Yes  Active Problems:    Hemiparesis affecting right side as late effect of stroke (HCC) POA: Yes    Primary hypertension POA: Yes    Abnormality of gait following cerebrovascular accident (CVA) POA: Yes    Type 2 diabetes mellitus with hyperglycemia, with long-term current use of insulin (HCC) POA: Yes  Resolved Problems:    * No resolved hospital problems. *      FOLLOW UP  No future appointments.  No  follow-up provider specified.    MEDICATIONS ON DISCHARGE     Medication List      START taking these medications      Instructions   amLODIPine 10 MG Tabs  Start taking on: June 21, 2022  Commonly known as: NORVASC   Take 1 Tablet by mouth every day.  Dose: 10 mg     aspirin 81 MG EC tablet  Start taking on: June 21, 2022   Take 1 Tablet by mouth every day.  Dose: 81 mg     atorvastatin 80 MG tablet  Commonly known as: LIPITOR   Take 1 Tablet by mouth every evening.  Dose: 80 mg     carvedilol 25 MG Tabs  Commonly known as: COREG   Take 1 Tablet by mouth 2 times a day with meals.  Dose: 25 mg     clopidogrel 75 MG Tabs  Start taking on: June 21, 2022  Commonly known as: PLAVIX   Take 1 Tablet by mouth every day for 3 days.  Dose: 75 mg     docusate sodium 100 MG Caps   Take 100 mg by mouth 2 times a day.  Dose: 100 mg     insulin GLARGINE 100 UNIT/ML Soln  Start taking on: June 21, 2022  Commonly known as: LantusSemglee   Inject 15 Units under the skin every morning.  Dose: 15 Units     losartan 100 MG Tabs  Start taking on: June 21, 2022  Commonly known as: COZAAR   Take 1 Tablet by mouth every day.  Dose: 100 mg            Allergies  Allergies   Allergen Reactions   • Penicillins Unspecified       DIET  Orders Placed This Encounter   Procedures   • Diet Order Diet: Consistent CHO (Diabetic)     Standing Status:   Standing     Number of Occurrences:   1     Order Specific Question:   Diet:     Answer:   Consistent CHO (Diabetic) [4]       ACTIVITY  As tolerated and directed by rehab.  Weight bearing as tolerated    LINES, DRAINS, AND WOUNDS  This is an automated list. Peripheral IVs will be removed prior to discharge.                     MENTAL STATUS ON TRANSFER   A&OX4          CONSULTATIONS  NONE    PROCEDURES  No orders to display         LABORATORY  Lab Results   Component Value Date    SODIUM 141 06/07/2022    POTASSIUM 3.5 (L) 06/07/2022    CHLORIDE 106 06/07/2022    CO2 24 06/07/2022    GLUCOSE 196 (H)  06/07/2022    BUN 20 06/07/2022    CREATININE 1.23 06/07/2022        Lab Results   Component Value Date    WBC 9.6 06/03/2022    HEMOGLOBIN 16.7 06/03/2022    HEMATOCRIT 48.7 06/03/2022    PLATELETCT 304 06/03/2022        Total time of the discharge process exceeds 43 minutes.

## 2022-06-20 NOTE — CARE PLAN
The patient is Watcher - Medium risk of patient condition declining or worsening    Shift Goals  Clinical Goals: Safety      Problem: Skin Integrity  Goal: Patient's skin integrity will be maintained or improve  Note: 2 RN skin check done with admitting RN and RN Lina. Face photo and skin photos documented in media. Appropriate LDAs opened.   Pt with Redd score of 18.    Patient has scabs to right inner arm and discoloration to right big toe, photos uploaded.      Problem: Pain - Standard  Goal: Alleviation of pain or a reduction in pain to the patient’s comfort goal  Note: Patient having bilateral ankle pain, cold pack was provided, will continue to monitor.

## 2022-06-20 NOTE — PROGRESS NOTES
Patient admitted to facility at 1210 via transport; accompanied by hospital transport. Patient assisted to room and positioned in bed for comfort and safety, call light within reach. Patient assisted with stowing belongings and oriented to room and facility. Admission assessment performed and documented in computer. Admission paperwork completed, signed copies placed in chart. Will continue to monitor.

## 2022-06-21 DIAGNOSIS — I63.9 CEREBROVASCULAR ACCIDENT (CVA), UNSPECIFIED MECHANISM (HCC): ICD-10-CM

## 2022-06-21 LAB
25(OH)D3 SERPL-MCNC: 8 NG/ML (ref 30–100)
ALBUMIN SERPL BCP-MCNC: 3.7 G/DL (ref 3.2–4.9)
ALBUMIN/GLOB SERPL: 1.1 G/DL
ALP SERPL-CCNC: 66 U/L (ref 30–99)
ALT SERPL-CCNC: 31 U/L (ref 2–50)
ANION GAP SERPL CALC-SCNC: 11 MMOL/L (ref 7–16)
AST SERPL-CCNC: 21 U/L (ref 12–45)
BASOPHILS # BLD AUTO: 0.5 % (ref 0–1.8)
BASOPHILS # BLD: 0.03 K/UL (ref 0–0.12)
BILIRUB SERPL-MCNC: 0.6 MG/DL (ref 0.1–1.5)
BUN SERPL-MCNC: 17 MG/DL (ref 8–22)
CALCIUM SERPL-MCNC: 9.4 MG/DL (ref 8.5–10.5)
CHLORIDE SERPL-SCNC: 107 MMOL/L (ref 96–112)
CO2 SERPL-SCNC: 24 MMOL/L (ref 20–33)
CREAT SERPL-MCNC: 0.96 MG/DL (ref 0.5–1.4)
EOSINOPHIL # BLD AUTO: 0.09 K/UL (ref 0–0.51)
EOSINOPHIL NFR BLD: 1.4 % (ref 0–6.9)
ERYTHROCYTE [DISTWIDTH] IN BLOOD BY AUTOMATED COUNT: 39.8 FL (ref 35.9–50)
GFR SERPLBLD CREATININE-BSD FMLA CKD-EPI: 92 ML/MIN/1.73 M 2
GLOBULIN SER CALC-MCNC: 3.3 G/DL (ref 1.9–3.5)
GLUCOSE BLD STRIP.AUTO-MCNC: 134 MG/DL (ref 65–99)
GLUCOSE BLD STRIP.AUTO-MCNC: 166 MG/DL (ref 65–99)
GLUCOSE BLD STRIP.AUTO-MCNC: 168 MG/DL (ref 65–99)
GLUCOSE BLD STRIP.AUTO-MCNC: 189 MG/DL (ref 65–99)
GLUCOSE SERPL-MCNC: 120 MG/DL (ref 65–99)
HCT VFR BLD AUTO: 43.6 % (ref 42–52)
HGB BLD-MCNC: 14.7 G/DL (ref 14–18)
IMM GRANULOCYTES # BLD AUTO: 0.01 K/UL (ref 0–0.11)
IMM GRANULOCYTES NFR BLD AUTO: 0.2 % (ref 0–0.9)
LYMPHOCYTES # BLD AUTO: 2.62 K/UL (ref 1–4.8)
LYMPHOCYTES NFR BLD: 40.3 % (ref 22–41)
MAGNESIUM SERPL-MCNC: 1.7 MG/DL (ref 1.5–2.5)
MCH RBC QN AUTO: 29.8 PG (ref 27–33)
MCHC RBC AUTO-ENTMCNC: 33.7 G/DL (ref 33.7–35.3)
MCV RBC AUTO: 88.3 FL (ref 81.4–97.8)
MONOCYTES # BLD AUTO: 0.55 K/UL (ref 0–0.85)
MONOCYTES NFR BLD AUTO: 8.5 % (ref 0–13.4)
NEUTROPHILS # BLD AUTO: 3.2 K/UL (ref 1.82–7.42)
NEUTROPHILS NFR BLD: 49.1 % (ref 44–72)
NRBC # BLD AUTO: 0 K/UL
NRBC BLD-RTO: 0 /100 WBC
PLATELET # BLD AUTO: 300 K/UL (ref 164–446)
PMV BLD AUTO: 11.2 FL (ref 9–12.9)
POTASSIUM SERPL-SCNC: 3.2 MMOL/L (ref 3.6–5.5)
PROT SERPL-MCNC: 7 G/DL (ref 6–8.2)
RBC # BLD AUTO: 4.94 M/UL (ref 4.7–6.1)
SODIUM SERPL-SCNC: 142 MMOL/L (ref 135–145)
WBC # BLD AUTO: 6.5 K/UL (ref 4.8–10.8)

## 2022-06-21 PROCEDURE — 80053 COMPREHEN METABOLIC PANEL: CPT

## 2022-06-21 PROCEDURE — 83735 ASSAY OF MAGNESIUM: CPT

## 2022-06-21 PROCEDURE — 97535 SELF CARE MNGMENT TRAINING: CPT

## 2022-06-21 PROCEDURE — 85025 COMPLETE CBC W/AUTO DIFF WBC: CPT

## 2022-06-21 PROCEDURE — 97165 OT EVAL LOW COMPLEX 30 MIN: CPT

## 2022-06-21 PROCEDURE — 97162 PT EVAL MOD COMPLEX 30 MIN: CPT

## 2022-06-21 PROCEDURE — 82962 GLUCOSE BLOOD TEST: CPT | Mod: 91

## 2022-06-21 PROCEDURE — 700111 HCHG RX REV CODE 636 W/ 250 OVERRIDE (IP): Performed by: PHYSICAL MEDICINE & REHABILITATION

## 2022-06-21 PROCEDURE — 770010 HCHG ROOM/CARE - REHAB SEMI PRIVAT*

## 2022-06-21 PROCEDURE — A9270 NON-COVERED ITEM OR SERVICE: HCPCS | Performed by: PHYSICAL MEDICINE & REHABILITATION

## 2022-06-21 PROCEDURE — 99233 SBSQ HOSP IP/OBS HIGH 50: CPT | Performed by: PHYSICAL MEDICINE & REHABILITATION

## 2022-06-21 PROCEDURE — 82306 VITAMIN D 25 HYDROXY: CPT

## 2022-06-21 PROCEDURE — 700102 HCHG RX REV CODE 250 W/ 637 OVERRIDE(OP): Performed by: PHYSICAL MEDICINE & REHABILITATION

## 2022-06-21 PROCEDURE — 36415 COLL VENOUS BLD VENIPUNCTURE: CPT

## 2022-06-21 PROCEDURE — 92523 SPEECH SOUND LANG COMPREHEN: CPT

## 2022-06-21 PROCEDURE — 97530 THERAPEUTIC ACTIVITIES: CPT

## 2022-06-21 PROCEDURE — 99253 IP/OBS CNSLTJ NEW/EST LOW 45: CPT | Performed by: HOSPITALIST

## 2022-06-21 RX ORDER — POTASSIUM CHLORIDE 750 MG/1
40 TABLET, FILM COATED, EXTENDED RELEASE ORAL ONCE
Status: COMPLETED | OUTPATIENT
Start: 2022-06-21 | End: 2022-06-21

## 2022-06-21 RX ORDER — ERGOCALCIFEROL 1.25 MG/1
50000 CAPSULE ORAL
Status: DISCONTINUED | OUTPATIENT
Start: 2022-06-21 | End: 2022-06-29 | Stop reason: HOSPADM

## 2022-06-21 RX ADMIN — AMLODIPINE BESYLATE 10 MG: 5 TABLET ORAL at 06:03

## 2022-06-21 RX ADMIN — LOSARTAN POTASSIUM 100 MG: 25 TABLET, FILM COATED ORAL at 06:03

## 2022-06-21 RX ADMIN — SENNOSIDES AND DOCUSATE SODIUM 2 TABLET: 50; 8.6 TABLET ORAL at 20:44

## 2022-06-21 RX ADMIN — INSULIN HUMAN 3 UNITS: 100 INJECTION, SOLUTION PARENTERAL at 11:25

## 2022-06-21 RX ADMIN — CARVEDILOL 25 MG: 12.5 TABLET, FILM COATED ORAL at 17:24

## 2022-06-21 RX ADMIN — CARVEDILOL 25 MG: 12.5 TABLET, FILM COATED ORAL at 08:09

## 2022-06-21 RX ADMIN — ENOXAPARIN SODIUM 40 MG: 40 INJECTION SUBCUTANEOUS at 17:23

## 2022-06-21 RX ADMIN — INSULIN HUMAN 3 UNITS: 100 INJECTION, SOLUTION PARENTERAL at 17:22

## 2022-06-21 RX ADMIN — INSULIN GLARGINE-YFGN 15 UNITS: 100 INJECTION, SOLUTION SUBCUTANEOUS at 08:06

## 2022-06-21 RX ADMIN — ATORVASTATIN CALCIUM 80 MG: 40 TABLET, FILM COATED ORAL at 20:44

## 2022-06-21 RX ADMIN — ERGOCALCIFEROL 50000 UNITS: 1.25 CAPSULE ORAL at 11:24

## 2022-06-21 RX ADMIN — CLOPIDOGREL BISULFATE 75 MG: 75 TABLET ORAL at 08:09

## 2022-06-21 RX ADMIN — POTASSIUM CHLORIDE 40 MEQ: 750 TABLET, FILM COATED, EXTENDED RELEASE ORAL at 11:24

## 2022-06-21 RX ADMIN — ASPIRIN 81 MG: 81 TABLET, COATED ORAL at 08:09

## 2022-06-21 ASSESSMENT — ACTIVITIES OF DAILY LIVING (ADL)
BED_CHAIR_WHEELCHAIR_TRANSFER_DESCRIPTION: INCREASED TIME;SET-UP OF EQUIPMENT;SUPERVISION FOR SAFETY;VERBAL CUEING
TUB_SHOWER_TRANSFER_DESCRIPTION: GRAB BAR;SHOWER BENCH;INCREASED TIME;SET-UP OF EQUIPMENT;SUPERVISION FOR SAFETY;VERBAL CUEING
BED_CHAIR_WHEELCHAIR_TRANSFER_DESCRIPTION: ADAPTIVE EQUIPMENT;INCREASED TIME;SET-UP OF EQUIPMENT;SUPERVISION FOR SAFETY;VERBAL CUEING
TOILETING: INDEPENDENT
TOILET_TRANSFER_DESCRIPTION: GRAB BAR;INCREASED TIME;SUPERVISION FOR SAFETY
TOILETING_LEVEL_OF_ASSIST_DESCRIPTION: GRAB BAR;SET-UP OF EQUIPMENT;SUPERVISION FOR SAFETY;INCREASED TIME

## 2022-06-21 ASSESSMENT — BRIEF INTERVIEW FOR MENTAL STATUS (BIMS)
BIMS SUMMARY SCORE: 15
ASKED TO RECALL BED: YES, NO CUE REQUIRED
INITIAL REPETITION OF BED BLUE SOCK - FIRST ATTEMPT: 3
ASKED TO RECALL BLUE: YES, NO CUE REQUIRED
WHAT YEAR IS IT: CORRECT
ASKED TO RECALL BLUE: YES, NO CUE REQUIRED
WHAT YEAR IS IT: CORRECT
WHAT DAY OF THE WEEK IS IT: CORRECT
WHAT DAY OF THE WEEK IS IT: CORRECT
ASKED TO RECALL SOCK: YES, NO CUE REQUIRED
WHAT MONTH IS IT: ACCURATE WITHIN 5 DAYS
ASKED TO RECALL SOCK: YES, NO CUE REQUIRED
WHAT MONTH IS IT: ACCURATE WITHIN 5 DAYS
ASKED TO RECALL BED: YES, NO CUE REQUIRED
BIMS SUMMARY SCORE: 15
INITIAL REPETITION OF BED BLUE SOCK - FIRST ATTEMPT: 3

## 2022-06-21 ASSESSMENT — PAIN DESCRIPTION - PAIN TYPE: TYPE: ACUTE PAIN

## 2022-06-21 ASSESSMENT — GAIT ASSESSMENTS
GAIT LEVEL OF ASSIST: CONTACT GUARD ASSIST
ASSISTIVE DEVICE: NONE;SINGLE POINT CANE;FRONT WHEEL WALKER

## 2022-06-21 NOTE — DISCHARGE PLANNING
CASE MANAGEMENT INITIAL ASSESSMENT    Admit Date:  6/20/2022     I spoke with patient to discuss role of case management / discharge planning / team conference.     Patient is a  56 y.o. male transferred from outside hospital.    Diagnosis: Ischemic stroke ([facility]) [I63.9]    Co-morbidities:   Patient Active Problem List    Diagnosis Date Noted   • Ischemic stroke ([facility]) 06/20/2022   • Impaired mobility and ADLs 06/20/2022   • Other hyperlipidemia 06/20/2022   • Paroxysmal atrial fibrillation ([facility]) 06/20/2022   • Diabetic peripheral neuropathy ([facility]) 06/20/2022   • Coronary artery disease involving native coronary artery of native heart without angina pectoris 06/20/2022   • Abnormality of gait following cerebrovascular accident (CVA) 06/06/2022   • Type 2 diabetes mellitus with hyperglycemia, with long-term current use of insulin ([facility]) 06/06/2022   • Hemiparesis affecting right side as late effect of stroke ([facility]) 06/04/2022   • CVA (cerebral vascular accident) ([facility]) 06/04/2022   • Primary hypertension 06/04/2022     Prior Living Situation:  Housing / Facility: 2 Story Apartment / Condo (pt lives on 2nd floor)  Lives with - Patient's Self Care Capacity: Alone and Able to Care For Self    Prior Level of Function:  Medication Management: Independent  Finances: Independent  Home Management: Independent  Shopping: Independent  Prior Level Of Mobility: Independent Without Device in Community  Driving / Transportation: Driving Independent    Support Systems:  Primary : Monik Newman: [phone] (Sig Other)  Other support systems: Castillo Madrigal: [phone]  Advance Directives: Yes  Power of  (Name & Phone): None    Previous Services Utilized:   Equipment Owned: None  Prior Services: Home-Independent    Other Information:  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 Gain strength 2. Be able to return to work 3. Continue therapy after DC.    Plan:  1. Continue to follow patient through hospitalization and provide discharge planning in collaboration with patient, family, physicians and ancillary services.     2. Utilize community resources to ensure a safe discharge.

## 2022-06-21 NOTE — PROGRESS NOTES
Received bedside shift report from nIes HALL RN regarding patient and assumed care. Patient awake, calm and stable, currently positioned in bed for comfort and safety; call light within reach. Denies pain or discomfort at this time. Will continue to monitor.

## 2022-06-21 NOTE — PROGRESS NOTES
"Rehab Progress Note     Date of Service: 6/21/2022  Chief Complaint: follow up stroke    Interval Events (Subjective)    Patient seen and examined today in his room.   He was evaluated by SLP, will only need 30 min of stroke education as cognition is high level.  Continues to report right hand weakness.  Discussed labs including very low Vit D.  Hospitalist consulted for medical co-morbidities.  Patient admits he had weaned himself off his medications after moving here a year ago from [place].   He was on aspirin for the cardiac stents that were placed there back in 2014.  He is agreeable for placement of a cardiac monitor.  He has no other new concerns or complaints today.    ROS: No changes to bowel, bladder, pain, mood, or sleep.       Objective:  VITAL SIGNS: /84   Pulse 60   Temp 36.8 °C (98.2 °F) (Temporal)   Resp 20   Ht 1.803 m (5' 11\")   Wt 88.5 kg (195 lb)   SpO2 96%   BMI 27.20 kg/m²   Gen: alert, no apparent distress  CV: Regular rate, regular rhythm  Resp: Clear to auscultation bilaterally  Neuro: right hemiparesis, worst in right hand    Recent Results (from the past 72 hour(s))   POCT glucose device results    Collection Time: 06/18/22  4:48 PM   Result Value Ref Range    POC Glucose, Blood 166 (H) 65 - 99 mg/dL   POCT glucose device results    Collection Time: 06/18/22  7:59 PM   Result Value Ref Range    POC Glucose, Blood 195 (H) 65 - 99 mg/dL   POCT glucose device results    Collection Time: 06/19/22  8:32 AM   Result Value Ref Range    POC Glucose, Blood 149 (H) 65 - 99 mg/dL   POCT glucose device results    Collection Time: 06/19/22 12:06 PM   Result Value Ref Range    POC Glucose, Blood 178 (H) 65 - 99 mg/dL   POCT glucose device results    Collection Time: 06/19/22  4:56 PM   Result Value Ref Range    POC Glucose, Blood 160 (H) 65 - 99 mg/dL   POCT glucose device results    Collection Time: 06/19/22  8:09 PM   Result Value Ref Range    POC Glucose, Blood 178 (H) 65 - 99 mg/dL "   POCT glucose device results    Collection Time: 06/20/22  8:37 AM   Result Value Ref Range    POC Glucose, Blood 137 (H) 65 - 99 mg/dL   SNF COVID Discharge (DRY NASAL SWAB IS REQUIRED)    Collection Time: 06/20/22 10:18 AM   Result Value Ref Range    SARS-CoV-2 Source Nasal Swab     SARS-COV ANTIGEN MICAELA NotDetected NotDetected   POCT glucose device results    Collection Time: 06/20/22 12:58 PM   Result Value Ref Range    POC Glucose, Blood 167 (H) 65 - 99 mg/dL   COV-2, FLU A/B, AND RSV BY PCR (2-4 HOURS CEPHEID): Collect NP swab in VTM    Collection Time: 06/20/22  1:00 PM    Specimen: Nasopharyngeal; Respirate   Result Value Ref Range    Influenza virus A RNA Negative Negative    Influenza virus B, PCR Negative Negative    RSV, PCR Negative Negative    SARS-CoV-2 by PCR NotDetected     SARS-CoV-2 Source NP Swab    POCT glucose device results    Collection Time: 06/20/22  4:57 PM   Result Value Ref Range    POC Glucose, Blood 203 (H) 65 - 99 mg/dL   POCT glucose device results    Collection Time: 06/20/22  9:49 PM   Result Value Ref Range    POC Glucose, Blood 102 (H) 65 - 99 mg/dL   CBC with Differential    Collection Time: 06/21/22  5:30 AM   Result Value Ref Range    WBC 6.5 4.8 - 10.8 K/uL    RBC 4.94 4.70 - 6.10 M/uL    Hemoglobin 14.7 14.0 - 18.0 g/dL    Hematocrit 43.6 42.0 - 52.0 %    MCV 88.3 81.4 - 97.8 fL    MCH 29.8 27.0 - 33.0 pg    MCHC 33.7 33.7 - 35.3 g/dL    RDW 39.8 35.9 - 50.0 fL    Platelet Count 300 164 - 446 K/uL    MPV 11.2 9.0 - 12.9 fL    Neutrophils-Polys 49.10 44.00 - 72.00 %    Lymphocytes 40.30 22.00 - 41.00 %    Monocytes 8.50 0.00 - 13.40 %    Eosinophils 1.40 0.00 - 6.90 %    Basophils 0.50 0.00 - 1.80 %    Immature Granulocytes 0.20 0.00 - 0.90 %    Nucleated RBC 0.00 /100 WBC    Neutrophils (Absolute) 3.20 1.82 - 7.42 K/uL    Lymphs (Absolute) 2.62 1.00 - 4.80 K/uL    Monos (Absolute) 0.55 0.00 - 0.85 K/uL    Eos (Absolute) 0.09 0.00 - 0.51 K/uL    Baso (Absolute) 0.03 0.00 -  0.12 K/uL    Immature Granulocytes (abs) 0.01 0.00 - 0.11 K/uL    NRBC (Absolute) 0.00 K/uL   Comp Metabolic Panel (CMP)    Collection Time: 06/21/22  5:30 AM   Result Value Ref Range    Sodium 142 135 - 145 mmol/L    Potassium 3.2 (L) 3.6 - 5.5 mmol/L    Chloride 107 96 - 112 mmol/L    Co2 24 20 - 33 mmol/L    Anion Gap 11.0 7.0 - 16.0    Glucose 120 (H) 65 - 99 mg/dL    Bun 17 8 - 22 mg/dL    Creatinine 0.96 0.50 - 1.40 mg/dL    Calcium 9.4 8.5 - 10.5 mg/dL    AST(SGOT) 21 12 - 45 U/L    ALT(SGPT) 31 2 - 50 U/L    Alkaline Phosphatase 66 30 - 99 U/L    Total Bilirubin 0.6 0.1 - 1.5 mg/dL    Albumin 3.7 3.2 - 4.9 g/dL    Total Protein 7.0 6.0 - 8.2 g/dL    Globulin 3.3 1.9 - 3.5 g/dL    A-G Ratio 1.1 g/dL   Magnesium    Collection Time: 06/21/22  5:30 AM   Result Value Ref Range    Magnesium 1.7 1.5 - 2.5 mg/dL   Vitamin D, 25-hydroxy (blood)    Collection Time: 06/21/22  5:30 AM   Result Value Ref Range    25-Hydroxy   Vitamin D 25 8 (L) 30 - 100 ng/mL   ESTIMATED GFR    Collection Time: 06/21/22  5:30 AM   Result Value Ref Range    GFR (CKD-EPI) 92 >60 mL/min/1.73 m 2   POCT glucose device results    Collection Time: 06/21/22  8:01 AM   Result Value Ref Range    POC Glucose, Blood 134 (H) 65 - 99 mg/dL   POCT glucose device results    Collection Time: 06/21/22 11:24 AM   Result Value Ref Range    POC Glucose, Blood 189 (H) 65 - 99 mg/dL       Current Facility-Administered Medications   Medication Frequency   • vitamin D2 (Ergocalciferol) (Drisdol) capsule 50,000 Units Q7 DAYS   • hydrOXYzine HCl (ATARAX) tablet 50 mg Q6HRS PRN   • melatonin tablet 3 mg HS PRN   • Respiratory Therapy Consult Continuous RT   • Pharmacy Consult Request ...Pain Management Review 1 Each PHARMACY TO DOSE   • hydrALAZINE (APRESOLINE) tablet 10 mg Q8HRS PRN   • acetaminophen (Tylenol) tablet 650 mg Q4HRS PRN   • lactulose 20 GM/30ML solution 30 mL QDAY PRN   • artificial tears ophthalmic solution 1 Drop PRN   • benzocaine-menthol  (Cepacol) lozenge 1 Lozenge Q2HRS PRN   • mag hydrox-al hydrox-simeth (MAALOX PLUS ES or MYLANTA DS) suspension 20 mL Q2HRS PRN   • ondansetron (ZOFRAN ODT) dispertab 4 mg 4X/DAY PRN    Or   • ondansetron (ZOFRAN) syringe/vial injection 4 mg 4X/DAY PRN   • traZODone (DESYREL) tablet 50 mg QHS PRN   • sodium chloride (OCEAN) 0.65 % nasal spray 2 Spray PRN   • amLODIPine (NORVASC) tablet 10 mg Q DAY   • aspirin EC (ECOTRIN) tablet 81 mg DAILY   • atorvastatin (LIPITOR) tablet 80 mg Q EVENING   • carvedilol (COREG) tablet 25 mg BID WITH MEALS   • clopidogrel (PLAVIX) tablet 75 mg DAILY   • enoxaparin (Lovenox) inj 40 mg DAILY AT 1800   • insulin GLARGINE (Lantus,Semglee) injection QAM INSULIN   • losartan (COZAAR) tablet 100 mg Q DAY   • senna-docusate (PERICOLACE or SENOKOT S) 8.6-50 MG per tablet 2 Tablet BID    And   • polyethylene glycol/lytes (MIRALAX) PACKET 1 Packet QDAY PRN    And   • magnesium hydroxide (MILK OF MAGNESIA) suspension 30 mL QDAY PRN    And   • bisacodyl (DULCOLAX) suppository 10 mg QDAY PRN   • insulin regular (HumuLIN R,NovoLIN R) injection 4X/DAY ACHS       Orders Placed This Encounter   Procedures   • Diet Order Diet: Consistent CHO (Diabetic)     Standing Status:   Standing     Number of Occurrences:   1     Order Specific Question:   Diet:     Answer:   Consistent CHO (Diabetic) [4]       Assessment:    This patient is a 56 y.o. male admitted for acute inpatient rehabilitation with Ischemic stroke (HCC).    Problem List/Medical Decision Making and Plan:    Left corona radiata ischemic stroke  Right hemiparesis  Dominant  Continue full rehab program  PT/OT/SLP, 1 hr each discipline, 5 days per week  6/21: SLP to decrease to 30, other 30 for OT as greatest impairment is his right hand    Aspirin and Plavix x 3 weeks, then aspirin alone  Statin    Cardiac monitor placed today to check for atrial fibrillation (patient with history of), though this stroke is more likely lacunar    Outpatient  follow up with neurology, Dr. Silverman, cardiology, referrals made    Diabetes type 2 with hyperglycemia  Glargine  Sliding scale insulin  Consult hospitalist     Diabetic peripheral neuropathy  Monitor need for gabapentin     Hypertension  Losartan  Carvedilol  Amlodipine  Consult hospitalist     Paroxysmal atrial fibrillation  Carvedilol  Cardiac monitor placed     Hypokalemia  Supplementation     Coronary artery disease  Status post stents 2014 placed in [place]  Aspirin (on prior to admission)  Plavix (just for now as DAPT for stroke)    Vit D deficiency, 8  Start high dose supplementation    Bowel program  Continue bowel medications  Last  6/18    Bladder program  Check PVRs - 26, 61  Bladder scan for no voids  ICP for over 400 cc  Scheduled toileting     DVT prophylaxis  Lovenox    Total time:  35 minutes.  I spent greater than 50% of the time for patient care, counseling, and coordination on this date, including patient face-to face time, unit/floor time with review of records/pertinent lab data and studies, as well as discussing diagnostic evaluation/work up, planned therapeutic interventions, and future disposition of care, as per the interval events/subjective and the assessment and plan as noted above.      Aurora Gonzalez M.D.   Physical Medicine and Rehabilitation

## 2022-06-21 NOTE — CARE PLAN
Problem: Knowledge Deficit - Standard  Goal: Patient and family/care givers will demonstrate understanding of plan of care, disease process/condition, diagnostic tests and medications  Outcome: Progressing  Note: Pt agrees with plan of care tonight regarding medications and safety.  Will continue to monitor patient.      Problem: Fall Risk - Rehab  Goal: Patient will remain free from falls  Note: Leia Queen Fall risk Assessment Score:  9      Low fall risk interventions   - Call light within reach   - Yellow  socks   - Belongings within reach   - Bed in the lowest position          The patient is Stable - Low risk of patient condition declining or worsening    Shift Goals  Clinical Goals: Safety  Patient Goals: Sleep well    Progress made toward(s) clinical / shift goals:  progressing

## 2022-06-21 NOTE — DIETARY
"Nutrition services: Day 1 of admit.  Reji Madrigal Jr. is a 56 y.o. male with admitting DX of Ischemic stroke (HCC) [I63.9]    Consult received for MST 2 positive for wt loss of unsure amount with no poor PO intake reported. RD visited pt at bedside. Pt reports wt loss was an \"unintended consequence I was happy with\" after he made the change of reducing CHO intake and focusing on protein foods. Pt requesting afternoon protein snack; currently has HS snack of sandwich in place. Pt states lost wt from  lbs in 3 weeks; goal wt per pt is 180-185 lb. Pt denies any weakness associated w/ wt loss.    Assessment:  Height: 180.3 cm (5' 11\")  Weight: 88.5 kg (195 lb) - via bed scale  Body mass index is 27.2 kg/m²., BMI classification: Overweight  Diet/Intake: Consistent CHO; % x 2 meals per ADLs    Evaluation:   1. PMHx includes T2DM on insulin (no noted A1c to review per chart), HTN, stroke x 5 days PTA w/ R-sided hemiparesis, presented 6/3 after DC'd AMA from [facility] w/ note per H&P that pt was unhappy w/ medical care at that facility.  2. Per RD screening notes from [facility] admit 6/3-6/20, pt eating well at % of meals. No measured wt that admit until 6/15, ~195 lb.  3. Labs: K+ 3.2, glu 120, POC glu 134  4. MAR: lovenox, plavix, lantus 15 units QD, SSI, senna, ergocalciferol  5. Last BM 6/18  6. Nutrition-focused physical exam not performed; however, pt appeared well-nourished for age w/ no overt signs of fat or muscle wasting.    Malnutrition Risk: Criteria not met; at risk w/ reported 5.8% wt loss in 3 weeks; does not seem consistent w/ pt's PO intake per ADLs 6/3 through present. Wt loss may have occurred over longer period of time. No wt hx in past 3 months to review per chart hx. Pt intends to continue to lose wt to reach goal wt 180-185 lbs.    Recommendations/Plan:  1. Provide protein snack per pt preference.   2. Encourage intake of meals and snacks >50-75%.  3. Document intake of all PO as % " taken in ADL's to provide interdisciplinary communication across all shifts.   4. Monitor weight.  5. Nutrition rep will continue to see patient for ongoing meal and snack preferences.     RD will monitor with weekly screening per department policy; available PRN.

## 2022-06-21 NOTE — THERAPY
Occupational Therapy   Initial Evaluation     Patient Name: Reji Madrigal Jr.  Age:  56 y.o., Sex:  male  Medical Record #: [id number]  Today's Date: 6/21/2022     Subjective    Pt received supine in bed; agreeable to OT session.      Objective       06/21/22 0701   OT Charge Group   OT Self Care / ADL 1   OT Evaluation OT Evaluation Low   OT Total Time Spent   OT Individual Total Time Spent (Mins) 60   Prior Living Situation   Prior Services Home-Independent   Housing / Facility 1 Story Apartment / Condo  (Pt lives on second floor)   Steps Into Home   (Needs verfication)   Steps In Home 0   Rail   (Needs verification)   Elevator   (Needs verification)   Bathroom Set up Bathtub / Shower Combination   Equipment Owned None   Lives with - Patient's Self Care Capacity Alone and Able to Care For Self   Comments   (Pt reports that he will have a friend for support upon DC; reports that he may be moving from his current living situation to live w/ friend)   Prior Level of ADL Function   Self Feeding Independent   Grooming / Hygiene Independent   Bathing Independent   Dressing Independent   Toileting Independent   Prior Level of IADL Function   Medication Management Independent   Laundry Independent   Kitchen Mobility Independent   Finances Independent   Home Management Independent   Shopping Independent   Prior Level Of Mobility Independent Without Device in Community   Driving / Transportation Driving Independent   Occupation (Pre-Hospital Vocational) Other (Comments)  (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 ; unknown empoyment status)   Leisure Interests Sports  (Basketball)   Prior Functioning: Everyday Activities   Self Care Independent   Indoor Mobility (Ambulation) Independent   Stairs Independent   Functional Cognition Independent   Prior Device Use None of the given options   Cognition    Speech/ Communication Word Finding Impairment   Orientation Level Oriented x 4   Level of Consciousness Alert   Ability To Follow  "Commands 3 Step   Attention Impaired   Cognitive Pattern Assessment   Cognitive Pattern Assessment Used BIMS   Brief Interview for Mental Status (BIMS)   Repetition of Three Words (First Attempt) 3   Temporal Orientation: Year Correct   Temporal Orientation: Month Accurate within 5 days   Temporal Orientation: Day Correct   Recall: \"Sock\" Yes, no cue required   Recall: \"Blue\" Yes, no cue required   Recall: \"Bed\" Yes, no cue required   BIMS Summary Score 15   Passive ROM Upper Body   Passive ROM Upper Body WDL   Active ROM Upper Body   Active ROM Upper Body  WDL   Dominant Hand Right   Comments   (Has full ROM limited by fatigue and repetition in RUE)   Strength Upper Body   Upper Body Strength  X   Comments RUE weakness, distal>proximal   Sensation Upper Body   Upper Extremity Sensation  Not Tested   Upper Body Muscle Tone   Upper Body Muscle Tone  WDL   Balance Assessment   Sitting Balance (Static) Good   Sitting Balance (Dynamic) Good   Standing Balance (Static) Fair   Standing Balance (Dynamic) Fair -   Weight Shift Sitting Good   Weight Shift Standing Fair   Bed Mobility    Supine to Sit Supervised   Sit to Stand Standby Assist   Scooting Supervised   Coordination Upper Body   Coordination X   Fine Motor Coordination RUE impaired   Gross Motor Coordination RUE impaired   Eating   Assistance Needed Set-up / clean-up   Physical Assistance Level No physical assistance   CARE Score - Eating 5   Eating Discharge Goal   Discharge Goal 6   Oral Hygiene   Assistance Needed Set-up / clean-up   Physical Assistance Level No physical assistance   CARE Score - Oral Hygiene 5   Oral Hygiene Discharge Goal   Discharge Goal 6   Shower/Bathe Self   Assistance Needed Set-up / clean-up;Supervision;Verbal cues   Physical Assistance Level 25% or less   CARE Score - Shower/Bathe Self 3   Shower/Bathe Self Discharge Goal   Discharge Goal 6   Upper Body Dressing   Assistance Needed Set-up / clean-up;Supervision   Physical Assistance " Level 25% or less   CARE Score - Upper Body Dressing 3   Upper Body Dressing Discharge Goal   Discharge Goal 6   Lower Body Dressing   Assistance Needed Set-up / clean-up;Supervision   Physical Assistance Level No physical assistance   CARE Score - Lower Body Dressing 4   Lower Body Dressing Discharge Goal   Discharge Goal 6   Putting On/Taking Off Footwear   Assistance Needed Physical assistance;Supervision;Set-up / clean-up   Physical Assistance Level Total assistance   CARE Score - Putting On/Taking Off Footwear 1   Putting On/Taking Off Footwear Discharge Goal   Discharge Goal 6   Toileting Hygiene   Assistance Needed Set-up / clean-up;Supervision   Physical Assistance Level No physical assistance   CARE Score - Toileting Hygiene 4   Toileting Hygiene Discharge Goal   Discharge Goal 6   Toilet Transfer   Assistance Needed Set-up / clean-up;Supervision   Physical Assistance Level No physical assistance   CARE Score - Toilet Transfer 4   Toilet Transfer Discharge Goal   Discharge Goal 6   Hearing, Speech, and Vision   Ability to Hear Adequate   Ability to See in Adequate Light Adequate   Expression of Ideas and Wants Without difficulty   Understanding Verbal and Non-Verbal Content Understands   Functional Level of Assist   Eating Supervision   Eating Description Set-up of equipment or meal/tube feeding   Grooming Standby Assist   Grooming Description Increased time;Seated in wheelchair at sink;Set-up of equipment;Supervision for safety  (Pt completed oral care with SBA and shaving using electric razor with Min A to reach back of head/neck)   Bathing Minimal Assist   Bathing Description Grab bar;Hand held shower;Tub bench;Set-up of equipment;Increased time;Supervision for safety;Verbal cueing  (Pt required assist to wash back while standing at GB; able to wipe his gabriela area/buttocks/lower legs)   Upper Body Dressing Minimal Assist   Upper Body Dressing Description Assit with threading arms through sleeves;Increased  time;Set-up of equipment;Initial preparation for task;Verbal cueing  (educated on laure tech., and assisted with threading RUE)   Lower Body Dressing Minimal Assist   Lower Body Dressing Description Grab bar;Increased time;Set-up of equipment;Supervision for safety  (Pt able to don/doff shorts SBA-CGA; don socks with Min A)   Toileting Supervision   Toileting Description Grab bar;Set-up of equipment;Supervision for safety;Increased time   Bed, Chair, Wheelchair Transfer Contact Guard Assist   Bed Chair Wheelchair Transfer Description Increased time;Set-up of equipment;Supervision for safety;Verbal cueing  (STS, stand step bed>wc)   Toilet Transfers Contact Guard Assist   Toilet Transfer Description Grab bar;Increased time;Supervision for safety  (STS, stand step wc>toilet)   Tub / Shower Transfers Contact Guard Assist   Tub Shower Transfer Description Grab bar;Shower bench;Increased time;Set-up of equipment;Supervision for safety;Verbal cueing  (STS, ambulated from toilet>shower bench with CGA)   Problem List   Problem List Decreased Homemaking Skills;Decreased Active Daily Living Skills;Decreased Upper Extremity Strength Right;Decreased Upper Extremity AROM Right;Decreased Functional Mobility;Decreased Activity Tolerance;Impaired Coordination Right Upper Extremity;Impaired Postural Control / Balance  (Distal RUE weakness; Peripheral neuropathy in Bilateral feet)   Precautions   Precautions Fall Risk  (distal RUE weakness)   Current Discharge Plan   Current Discharge Plan Unknown at this Time  (Pt's lease expiring, has limited social support for dc)   Benefit    Therapy Benefit Patient Would Benefit from Inpatient Rehab Occupational Therapy to Maximize [place] with ADLs, IADLs and Functional Mobility.   Interdisciplinary Plan of Care Collaboration   IDT Collaboration with  Nursing;Physical Therapist   Patient Position at End of Therapy Seated;Chair Alarm On;Self Releasing Lap Belt Applied  (Pt passed off to  nursing)   Collaboration Comments CLOF, POC   Equipment Needs   Assistive Device / DME Front-Wheel Walker;Shower Chair;Wheelchair;Grab Bars In Shower / Tub;Grab Bars By Toilet   Adaptive Equipment Reacher;Built Up Utensils;Long Handled Sponge   Strengths & Barriers   Strengths Able to follow instructions;Alert and oriented;Effective communication skills;Good insight into deficits/needs;Independent prior level of function;Pleasant and cooperative;Willingly participates in therapeutic activities;Motivated for self care and independence   Barriers Fatigue;Generalized weakness;Hemiparesis;Home accessibility;Hypertension;Impaired balance;Impaired activity tolerance;Poor family support  (diabetes, distal RUE weakness)       Assessment  Patient is 56 y.o. male with a diagnosis of  Ischemic stroke.  Additional factors influencing patient status / progress (ie: cognitive factors, co-morbidities, social support, etc): Pt has past medical hx of diabetes, hypertension, intermittent A-fib, and myocardial infarction.       Pt lives alone in an apartment on the second floor; further verification needed for SEAN, railing, and present elevators. He has a bathub/shower combination and reports no use of DME. Per chart review, Pt's apartment lease expires this week. Pt states he will have a friend available to help him upon dc, but they do not live in [place] and he will be moving in with them. Pt reports Ind PLOF for ADLs/IADLs, driving, cooking, and functional mobility.      Pt is SBA-CGA for functional txfrs from bed>wc, wc>toilet, and shower bench>wc. He completed ADL tasks from SPV-Min A level, bathing task was completed at Min a level due to requiring assist with washing gabriela area/buttocks while standing at GB. Pt presents with RUE weakness that fatigues with repetitive use; distal>proximal.     Plan  Recommend Occupational Therapy  minutes per day 5-7 days per week for 10-14 days for the following treatments:  OT E Stim  Attended, OT Group Therapy, OT Self Care/ADL, OT Community Reintegration, OT Neuro Re-Ed/Balance, OT Therapeutic Activity, OT Evaluation and OT Therapeutic Exercise.    Passport items to be completed:  Perform bathroom transfers, complete dressing, complete feeding, get ready for the day, prepare a simple meal, participate in household tasks, adapt home for safety needs, demonstrate home exercise program, complete caregiver training     Goals:  Long term and short term goals have been discussed with patient and they are in agreement.    Occupational Therapy Goals (Active)       Problem: Bathing       Dates: Start: 06/21/22         Goal: STG-Within one week, patient will bathe at SPV level.        Dates: Start: 06/21/22               Problem: Dressing       Dates: Start: 06/21/22         Goal: STG-Within one week, patient will dress UB at SPV level using laure technique.       Dates: Start: 06/21/22            Goal: STG-Within one week, patient will dress LB at SPV level using laure technique        Dates: Start: 06/21/22               Problem: Functional Transfers       Dates: Start: 06/21/22         Goal: STG-Within one week, patient will transfer to toilet at SPV level        Dates: Start: 06/21/22               Problem: OT Long Term Goals       Dates: Start: 06/21/22         Goal: LTG-By discharge, patient will complete basic self care tasks at Mod I - Ind level.       Dates: Start: 06/21/22            Goal: LTG-By discharge, patient will perform bathroom transfers at Mod I - Ind level.       Dates: Start: 06/21/22            Goal: LTG-By discharge, patient will complete basic home management at Mod I - Ind level.       Dates: Start: 06/21/22

## 2022-06-21 NOTE — CARE PLAN
The patient is Stable - Low risk of patient condition declining or worsening    Shift Goals  Clinical Goals: Safety  Patient Goals: Sleep well    Progress made toward(s) clinical / shift goals:  progressing      Problem: Skin Integrity  Goal: Patient's skin integrity will be maintained or improve  Note: Patient's skin remains intact and free from new or accidental injury this shift.  Will continue to monitor.      Problem: VTE Prevention  Goal: Patient will remain free from venous thromboembolism (VTE)  Note: Pt on lovenox daily for DVT prophylaxis

## 2022-06-21 NOTE — THERAPY
"Speech Language Pathology   Initial Assessment     Patient Name: Reji Madrigal Jr.  AGE:  56 y.o., SEX:  male  Medical Record #: [id number]  Today's Date: 6/21/2022     Subjective    Cognitive evaluation orders placed by MD, pt pleasant and cooperative, willing to participate.     Objective       06/21/22 0903   Evaluation Charges   Charges Yes   SLP Speech Language Evaluation Speech Sound Language Comprehension   SLP Total Time Spent   SLP Individual Total Time Spent (Mins) 60   Precautions   Precautions Fall Risk   Prior Living Situation   Prior Services Home-Independent   Housing / Facility 1 Story Apartment / Condo   Lives with - Patient's Self Care Capacity Alone and Able to Care For Self   Prior Level Of Function   Communication Within Functional Limits   Hearing Within Functional Limits for Evaluation   Vision Reading    Patient's Primary Language English   Education Completed College   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.)   Cognitive Pattern Assessment   Cognitive Pattern Assessment Used BIMS   Brief Interview for Mental Status (BIMS)   Repetition of Three Words (First Attempt) 3   Temporal Orientation: Year Correct   Temporal Orientation: Month Accurate within 5 days   Temporal Orientation: Day Correct   Recall: \"Sock\" Yes, no cue required   Recall: \"Blue\" Yes, no cue required   Recall: \"Bed\" Yes, no cue required   BIMS Summary Score 15   Functional Level of Assist   Comprehension Modified Independent   Comprehension Description Glasses   Expression Independent   Social Interaction Independent   Problem Solving Supervision   Memory Minimal Assist   Outcome Measures   Outcome Measures Utilized SCCAN   SCCAN (Scales of Cognitive and Communicative Ability for Neurorehabilitation)   Oral Expression - Raw Score 19   Oral Expression - Scale Performance Score 100   Orientation - Raw Score 12   Orientation - Scale Performance Score 100   Memory - Raw Score 15   Memory " - Scale Performance Score 79   Speech Comprehension - Raw Score 13   Speech Comprehension - Scale Performance Score 100   Reading Comprehension - Raw Score 12   Reading Comprehension - Scale Performance Score 100   Writing - Raw Score 7   Writing - Scale Performance Score 100   Attention - Raw Score 15   Attention - Scale Performance Score 94   Problem Solving - Raw Score 22   Problem Solving - Scale Performance Score 96   SCCAN Total Raw Score 89   SCCAN Degree of Severity Typical Functioning   Problem List   Problem List Executive Function Deficit;Memory Deficit   Current Discharge Plan   Current Discharge Plan Unknown at this Time  (per chart review, live with GF in [facility])   Benefit   Therapy Benefit Patient would benefit from Inpatient Rehab Speech-Language Pathology to address above identified deficits.   Interdisciplinary Plan of Care Collaboration   IDT Collaboration with  Nursing;Physician   Patient Position at End of Therapy Seated;Chair Alarm On;Call Light within Reach;Tray Table within Reach;Phone within Reach   Collaboration Comments CLOF and POC   Speech Language Pathologist Assigned   Assigned SLP / Extension LC 30 NO SPLIT       Assessment    Patient is 56 y.o. male with a diagnosis of ischemic stroke.  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 Masters degree education achieved.     Cognitive assessment completed at this time with use of SCCAN, pt achieved a raw score of 89 which indicates WFL. Pt with lowest percentage achieved for memory (79%). Due to current employment and level of indep prior to hospitalization, brief cognitive intervention is recommend and warranted to address short term memory, stroke education and executive functioning skills. Pt with frequent questions related to speech deficits and facial weakness. Upon  exam pt with the slightest right side facial weakness at rest, speech 100% intelligible with no concerns noted by SLP.     Plan  Recommend Speech Therapy 30-60 minutes per day 5-6 days per week for 1 weeks for the following treatments:  SLP Self Care / ADL Training , SLP Cognitive Skill Development, and SLP Group Treatment.    Goals:  Long term and short term goals have been discussed with patient and they are in agreement.    Speech Therapy Problems (Active)       Problem: Memory STGs       Dates: Start: 06/21/22         Goal: STG-Within one week, patient will learn and implement functional memory strategies to assist in recall of information related to stroke, hospitalization and daily planning with 80% provided SPV       Dates: Start: 06/21/22               Problem: Problem Solving STGs       Dates: Start: 06/21/22         Goal: STG-Within one week, patient will complete complex executive function tasks with 90% accuracy provided SPV       Dates: Start: 06/21/22               Problem: Speech/Swallowing LTGs       Dates: Start: 06/21/22         Goal: LTG-By discharge, patient will complete complex problem solving and recall information with 80% accuracy and MOD I       Dates: Start: 06/21/22

## 2022-06-21 NOTE — THERAPY
Physical Therapy   Initial Evaluation     Patient Name: Reji Madrigal Jr.  Age:  56 y.o., Sex:  male  Medical Record #: [id number]  Today's Date: 6/21/2022     Subjective    Correct start time 1031. Patient agreeable to evaluation, says he wants to return to independence.      Objective       06/21/22 1200   PT Charge Group   PT Therapeutic Activities 1   PT Evaluation PT Evaluation Mod   PT Total Time Spent   PT Individual Total Time Spent (Mins) 60   Prior Living Situation   Prior Services Home-Independent   Housing / Facility 2 Story Apartment / Condo  (pt lives on 2nd floor)   Steps Into Home 12   Steps In Home 0   Rail Unable To Determine At This Time   Elevator   (unable to determine)   Equipment Owned None   Lives with - Patient's Self Care Capacity Alone and Able to Care For Self   Comments Patient reports to PT that he plans to discharge to [place] with his friend/SO. She lives in a split level condo, 6 steps to both floors from main entrance, bilateral hand rails, unable to reach both at the same time.  Unclear how many steps there are to enter landing from outside.   Prior Level of Functional Mobility   Bed Mobility Independent   Transfer Status Independent   Ambulation Independent   Distance Ambulation (Feet)   (community)   Assistive Devices Used None   Stairs Independent   Prior Functioning: Everyday Activities   Self Care Independent   Indoor Mobility (Ambulation) Independent   Stairs Independent   Functional Cognition Independent   Prior Device Use None of the given options   Vitals   Vitals Comments no s/s of distress   Passive ROM Lower Body   Passive ROM Lower Body WDL   Active ROM Lower Body    Active ROM Lower Body  WDL   Strength Lower Body   Lower Body Strength  WDL   Comments tested in supine, BLEs   Sensation Lower Body   Lower Extremity Sensation   WDL   Comments LT intact except B feet impaired secondary to neuropathy,DP and proprioception intact bilaterally, L3-S1   Lower Body  Muscle Tone   Lower Body Muscle Tone  WDL   Bed Mobility    Supine to Sit Supervised   Sit to Supine Standby Assist   Sit to Stand Standby Assist   Rolling Independent   Neurological Concerns   Neurological Concerns No   Coordination Lower Body    Coordination Lower Body  Not Tested   Roll Left and Right   Assistance Needed Independent   CARE Score - Roll Left and Right 6   Roll Left and Right Discharge Goal   Discharge Goal 6   Sit to Lying   Assistance Needed Supervision   CARE Score - Sit to Lying 4   Sit to Lying Discharge Goal   Discharge Goal 6   Lying to Sitting on Side of Bed   Assistance Needed Supervision   CARE Score - Lying to Sitting on Side of Bed 4   Lying to Sitting on Side of Bed Discharge Goal   Discharge Goal 6   Sit to Stand   Assistance Needed Supervision   CARE Score - Sit to Stand 4   Sit to Stand Discharge Goal   Discharge Goal 6   Chair/Bed-to-Chair Transfer   Assistance Needed Incidental touching   CARE Score - Chair/Bed-to-Chair Transfer 4   Chair/Bed-to-Chair Transfer Discharge Goal   Discharge Goal 6   Car Transfer   Assistance Needed Physical assistance   Physical Assistance Level 25% or less   CARE Score - Car Transfer 3   Car Transfer Discharge Goal   Discharge Goal 6   Walk 10 Feet   Assistance Needed Incidental touching   CARE Score - Walk 10 Feet 4   Walk 10 Feet Discharge Goal   Discharge Goal 6   Walk 50 Feet with Two Turns   Assistance Needed Incidental touching   CARE Score - Walk 50 Feet with Two Turns 4   Walk 50 Feet with Two Turns Discharge Goal   Discharge Goal 6   Walk 150 Feet   Assistance Needed Incidental touching   CARE Score - Walk 150 Feet 4   Walk 150 Feet Discharge Goal   Discharge Goal 6   Walking 10 Feet on Uneven Surfaces   Assistance Needed Incidental touching   CARE Score - Walking 10 Feet on Uneven Surfaces 4   Walking 10 Feet on Uneven Surfaces Discharge Goal   Discharge Goal 6   1 Step (Curb)   Assistance Needed Incidental touching   CARE Score - 1 Step  (Curb) 4   1 Step (Curb) Discharge Goal   Discharge Goal 6   4 Steps   Assistance Needed Incidental touching   CARE Score - 4 Steps 4   4 Steps Discharge Goal   Discharge Goal 6   12 Steps   Assistance Needed Incidental touching   CARE Score - 12 Steps 4   12 Steps Discharge Goal   Discharge Goal 6   Picking Up Object   Assistance Needed Incidental touching   CARE Score - Picking Up Object 4   Picking Up Object Discharge Goal   Discharge Goal 6   Wheel 50 Feet with Two Turns   Reason if not Attempted Activity not applicable   CARE Score - Wheel 50 Feet with Two Turns 9   Wheel 50 Feet with Two Turns Discharge Goal   Discharge Goal 9   Wheel 150 Feet   Reason if not Attempted Activity not applicable   CARE Score - Wheel 150 Feet 9   Wheel 150 Feet Discharge Goal   Discharge Goal 9   Gait Functional Level of Assist    Gait Level Of Assist Contact Guard Assist   Assistive Device None;Single Point Cane;Front Wheel Walker   Distance (Feet)   (250x 2, 50x1, 25x2 w/ FWW. 50ft x 1 each no AD, and SPC.)   Deviation Step To;Decreased Base Of Support;Decreased Heel Strike;Decreased Toe Off;Antalgic  (on the right, demos lack of TKE with IC/LR, mild ER on RLE. cues for step through pattern with gait using SPC and no device.)   Stairs Functional Level of Assist   Level of Assist with Stairs Contact Guard Assist   # of Stairs Climbed 12   Stairs Description Extra time;Hand rails;Limited by fatigue;Safety concerns;Supervision for safety  (4 inch high steps, BHRs, step to descent, reciprocal ascent)   Transfer Functional Level of Assist   Bed, Chair, Wheelchair Transfer Contact Guard Assist   Bed Chair Wheelchair Transfer Description Adaptive equipment;Increased time;Set-up of equipment;Supervision for safety;Verbal cueing   Problem List    Problems Impaired Balance;Impaired Coordination;Impaired Ambulation;Impaired Transfers;Motor Planning / Sequencing   Precautions   Precautions Fall Risk   Current Discharge Plan   Current  Discharge Plan Temporarily go to Family /  Friend's Home  (states he plans to go to [facility] with friend/SO)   Interdisciplinary Plan of Care Collaboration   IDT Collaboration with  Occupational Therapist   Patient Position at End of Therapy Seated;Chair Alarm On;Call Light within Reach;Tray Table within Reach;Phone within Reach   Collaboration Comments CLOF   Benefit   Therapy Benefit Patient Would Benefit from Inpatient Rehabilitation Physical Therapy to Maximize Functional Casey with ADLs, IADLs and Mobility.   Strengths & Barriers   Strengths Able to follow instructions;Alert and oriented;Effective communication skills;Pleasant and cooperative;Willingly participates in therapeutic activities;Independent prior level of function   Barriers Fatigue;Hemiparesis;Home accessibility;Impaired balance   Edu on rehab orientation, goal setting, barriers and PT POC.     Outdoor ambulation 30ft with FWW.                                                                                            Assessment  Patient is 56 y.o. male with a diagnosis of ischemic stroke, right body involvement.      Additional factors influencing patient status / progress (ie: cognitive factors, co-morbidities, social support, etc): diabetes, hypertension. patient also has diabetic peripheral neuropathy and uses insulin at home.  Patient also reports a history of intermittent atrial fibrillation and this is confirmed via chart review.  Patient also has a history of myocardial infarction for which he has 2 stents placed.     He currently states his apartment lease is up, and plans to move in with his SO/friend in [facility]. She resides in a [facility] per his report. Will need to confirm home set up closer to discharge and if is a viable option for him.  Previously was working full time for the [employer] as an .         Plan  Recommend Physical Therapy  minutes per day 5-7 days per week for 1-2 weeks for the following  treatments:  PT Group Therapy, PT E Stim Attended, PT Orthotics Training, PT Gait Training, PT Self Care/Home Eval, PT Therapeutic Exercises, PT TENS Application, PT Neuro Re-Ed/Balance, PT Aquatic Therapy, PT Therapeutic Activity, PT Manual Therapy and PT Evaluation.    Passport items to be completed:  Get in/out of bed safely, in/out of a vehicle, safely use mobility device, walk or wheel around home/community, navigate up and down stairs, show how to get up/down from the ground, ensure home is accessible, demonstrate HEP, complete caregiver training    Goals:  Long term and short term goals have been discussed with patient and they are in agreement.    Physical Therapy Problems (Active)       Problem: Balance       Dates: Start: 06/21/22         Goal: STG-Within one week, patient will complete ANDREW balance assessment.        Dates: Start: 06/21/22               Problem: Mobility       Dates: Start: 06/21/22         Goal: STG-Within one week, patient will ambulate community distances 150 feet with SPC and SBA.        Dates: Start: 06/21/22            Goal: STG-Within one week, patient will ambulate up/down flight of stairs with single hand rail and SBA.        Dates: Start: 06/21/22               Problem: Mobility Transfers       Dates: Start: 06/21/22         Goal: STG-Within one week, patient will move supine to/from sitting independently.        Dates: Start: 06/21/22               Problem: PT-Long Term Goals       Dates: Start: 06/21/22         Goal: LTG-By discharge, patient will ambulate 250 feet with spc vs no AD and independent.        Dates: Start: 06/21/22            Goal: LTG-By discharge, patient will transfer one surface to another with SPC or no AD and mod I.        Dates: Start: 06/21/22            Goal: LTG-By discharge, patient will ambulate up/down flight of stairs with single hand rail and mod I.        Dates: Start: 06/21/22            Goal: LTG-By discharge, patient will transfer in/out of a  car mod I.        Dates: Start: 06/21/22

## 2022-06-22 PROBLEM — E87.6 HYPOKALEMIA: Status: ACTIVE | Noted: 2022-06-22

## 2022-06-22 PROBLEM — E55.9 VITAMIN D DEFICIENCY: Status: ACTIVE | Noted: 2022-06-22

## 2022-06-22 LAB
EST. AVERAGE GLUCOSE BLD GHB EST-MCNC: 235 MG/DL
GLUCOSE BLD STRIP.AUTO-MCNC: 125 MG/DL (ref 65–99)
GLUCOSE BLD STRIP.AUTO-MCNC: 143 MG/DL (ref 65–99)
GLUCOSE BLD STRIP.AUTO-MCNC: 156 MG/DL (ref 65–99)
GLUCOSE BLD STRIP.AUTO-MCNC: 175 MG/DL (ref 65–99)
HBA1C MFR BLD: 9.8 % (ref 4–5.6)
PHOSPHATE SERPL-MCNC: 4.1 MG/DL (ref 2.5–4.5)
POTASSIUM SERPL-SCNC: 3.3 MMOL/L (ref 3.6–5.5)

## 2022-06-22 PROCEDURE — 97129 THER IVNTJ 1ST 15 MIN: CPT

## 2022-06-22 PROCEDURE — 700111 HCHG RX REV CODE 636 W/ 250 OVERRIDE (IP): Performed by: PHYSICAL MEDICINE & REHABILITATION

## 2022-06-22 PROCEDURE — 99233 SBSQ HOSP IP/OBS HIGH 50: CPT | Performed by: PHYSICAL MEDICINE & REHABILITATION

## 2022-06-22 PROCEDURE — 97530 THERAPEUTIC ACTIVITIES: CPT

## 2022-06-22 PROCEDURE — 97032 APPL MODALITY 1+ESTIM EA 15: CPT

## 2022-06-22 PROCEDURE — A9270 NON-COVERED ITEM OR SERVICE: HCPCS | Performed by: PHYSICAL MEDICINE & REHABILITATION

## 2022-06-22 PROCEDURE — A9270 NON-COVERED ITEM OR SERVICE: HCPCS | Performed by: HOSPITALIST

## 2022-06-22 PROCEDURE — 36415 COLL VENOUS BLD VENIPUNCTURE: CPT

## 2022-06-22 PROCEDURE — 97116 GAIT TRAINING THERAPY: CPT | Mod: CQ

## 2022-06-22 PROCEDURE — 97530 THERAPEUTIC ACTIVITIES: CPT | Mod: CQ

## 2022-06-22 PROCEDURE — 97130 THER IVNTJ EA ADDL 15 MIN: CPT

## 2022-06-22 PROCEDURE — 99232 SBSQ HOSP IP/OBS MODERATE 35: CPT | Performed by: HOSPITALIST

## 2022-06-22 PROCEDURE — 700102 HCHG RX REV CODE 250 W/ 637 OVERRIDE(OP): Performed by: PHYSICAL MEDICINE & REHABILITATION

## 2022-06-22 PROCEDURE — 97112 NEUROMUSCULAR REEDUCATION: CPT

## 2022-06-22 PROCEDURE — 84132 ASSAY OF SERUM POTASSIUM: CPT

## 2022-06-22 PROCEDURE — 700102 HCHG RX REV CODE 250 W/ 637 OVERRIDE(OP): Performed by: HOSPITALIST

## 2022-06-22 PROCEDURE — 82962 GLUCOSE BLOOD TEST: CPT

## 2022-06-22 PROCEDURE — 84100 ASSAY OF PHOSPHORUS: CPT

## 2022-06-22 PROCEDURE — 97112 NEUROMUSCULAR REEDUCATION: CPT | Mod: CQ

## 2022-06-22 PROCEDURE — 83036 HEMOGLOBIN GLYCOSYLATED A1C: CPT

## 2022-06-22 PROCEDURE — 770010 HCHG ROOM/CARE - REHAB SEMI PRIVAT*

## 2022-06-22 RX ORDER — POTASSIUM CHLORIDE 20 MEQ/1
40 TABLET, EXTENDED RELEASE ORAL ONCE
Status: COMPLETED | OUTPATIENT
Start: 2022-06-22 | End: 2022-06-22

## 2022-06-22 RX ADMIN — ASPIRIN 81 MG: 81 TABLET, COATED ORAL at 08:06

## 2022-06-22 RX ADMIN — ENOXAPARIN SODIUM 40 MG: 40 INJECTION SUBCUTANEOUS at 17:29

## 2022-06-22 RX ADMIN — ATORVASTATIN CALCIUM 80 MG: 40 TABLET, FILM COATED ORAL at 20:45

## 2022-06-22 RX ADMIN — CARVEDILOL 25 MG: 12.5 TABLET, FILM COATED ORAL at 17:29

## 2022-06-22 RX ADMIN — SENNOSIDES AND DOCUSATE SODIUM 2 TABLET: 50; 8.6 TABLET ORAL at 20:45

## 2022-06-22 RX ADMIN — LOSARTAN POTASSIUM 100 MG: 25 TABLET, FILM COATED ORAL at 05:40

## 2022-06-22 RX ADMIN — CARVEDILOL 25 MG: 12.5 TABLET, FILM COATED ORAL at 08:05

## 2022-06-22 RX ADMIN — POTASSIUM CHLORIDE 40 MEQ: 1500 TABLET, EXTENDED RELEASE ORAL at 13:32

## 2022-06-22 RX ADMIN — AMLODIPINE BESYLATE 10 MG: 5 TABLET ORAL at 05:40

## 2022-06-22 RX ADMIN — SENNOSIDES AND DOCUSATE SODIUM 2 TABLET: 50; 8.6 TABLET ORAL at 08:05

## 2022-06-22 RX ADMIN — CLOPIDOGREL BISULFATE 75 MG: 75 TABLET ORAL at 08:06

## 2022-06-22 ASSESSMENT — ENCOUNTER SYMPTOMS
VOMITING: 0
ABDOMINAL PAIN: 0
COUGH: 0
POLYDIPSIA: 0
EYES NEGATIVE: 1
CHILLS: 0
FEVER: 0
PALPITATIONS: 0
FOCAL WEAKNESS: 1
MUSCULOSKELETAL NEGATIVE: 1
BRUISES/BLEEDS EASILY: 0
NAUSEA: 0
SHORTNESS OF BREATH: 0

## 2022-06-22 ASSESSMENT — GAIT ASSESSMENTS
ASSISTIVE DEVICE: SINGLE POINT CANE
DISTANCE (FEET): 120
GAIT LEVEL OF ASSIST: CONTACT GUARD ASSIST
GAIT LEVEL OF ASSIST: CONTACT GUARD ASSIST
DISTANCE (FEET): 325
ASSISTIVE DEVICE: SINGLE POINT CANE

## 2022-06-22 ASSESSMENT — BALANCE ASSESSMENTS
PLACE ALTERNATE FOOT ON STEP OR STOOL WHILE STANDING UNSUPPORTED: 3
SITTING TO STANDING: 3
PICK UP OBJECT FROM THE FLOOR FROM A STANDING POSITION: 3
STANDING UNSUPPORTED ONE FOOT IN FRONT: 1
STANDING UNSUPPORTED WITH EYES CLOSED: 4
LOOK OVER LEFT AND RIGHT SHOULDERS WHILE STANDING: 3
STANDING TO SITTING: 4
LONG VERSION TOTAL SCORE (MAX 56): 42
REACHING FORWARD WITH OUTSTRETCHED ARM WHILE STANDING: 3
SITTING UNSUPPORTED: 4
STANDING UNSUPPORTED: 4
TURN 360 DEGREES: 2
STANDING ON ONE LEG: 1
TRANSFERS: 3
LONG VERSION TOTAL SCORE (MAX 56): 42
STANDING UNSUPPORTED WITH FEET TOGETHER: 4

## 2022-06-22 ASSESSMENT — ACTIVITIES OF DAILY LIVING (ADL)
BED_CHAIR_WHEELCHAIR_TRANSFER_DESCRIPTION: SQUAT PIVOT TRANSFER TO WHEELCHAIR
BED_CHAIR_WHEELCHAIR_TRANSFER_DESCRIPTION: ADAPTIVE EQUIPMENT;INCREASED TIME;SUPERVISION FOR SAFETY

## 2022-06-22 NOTE — ASSESSMENT & PLAN NOTE
BP recently ok  On Norvasc  On Coreg  On Cozaar  On Hydralazine -- dose recently increased  Cont to monitor

## 2022-06-22 NOTE — THERAPY
Physical Therapy   Daily Treatment     Patient Name: Reji Madrigal Jr.  Age:  56 y.o., Sex:  male  Medical Record #: [id number]  Today's Date: 6/22/2022     Precautions  Precautions: Fall Risk    Subjective    Pt was resting in bed upon arrival, agreeable to session once awoken.     Objective       06/22/22 0701   PT Charge Group   PT Gait Training 1   PT Neuromuscular Re-Education / Balance 2   PT Therapeutic Activities 1   Supervising Physical Therapist Kurt Brady   PT Total Time Spent   PT Individual Total Time Spent (Mins) 60   Pain 0 - 10 Group   Location Foot   Location Orientation Right   Pain Rating Scale (NPRS) 3   Cognition    Level of Consciousness Alert   Gait Functional Level of Assist    Gait Level Of Assist Contact Guard Assist  (SBA w/  ft prior to ambulating w/ SPC)   Assistive Device Single Point Cane   Distance (Feet) 120   # of Times Distance was Traveled 1   Deviation Step To;Decreased Base Of Support;Decreased Heel Strike;Decreased Toe Off;Antalgic   Transfer Functional Level of Assist   Bed, Chair, Wheelchair Transfer Contact Guard Assist   Bed Chair Wheelchair Transfer Description Adaptive equipment;Increased time;Supervision for safety  (SPC)   Bed Mobility    Sit to Supine Standby Assist   Sit to Stand Standby Assist   Scooting Supervised   Pulido Balance Scale   Sitting Unsupported (Score 0-4) 4   Change Of Positon: Sitting To Standing (Score 0-4) 3   Change Of Positon: Standing To Sitting (Score 0-4) 4   Transfers (Score 0-4) 3   Standing Unsupported (Score 0-4) 4   Standing With Eyes Closed (Score 0-4) 4   Standing With Feet Together (Score 0-4) 4   Tandem Standing (Score 0-4) 1   Standing On One Leg (Score 0-4) 1   Turning Trunk (Feet Fixed) (Score 0-4) 3   Retrieving Objects From Floor (Score 0-4) 3   Turning 360 Degrees (Score 0-4) 2   Stool Stepping (Score 0-4) 3   Reaching Forward While Standing (Score 0-4) 3   Pulido Balance Total Score (0-56) 42   Interdisciplinary Plan of  Care Collaboration   Patient Position at End of Therapy Seated;Call Light within Reach;Tray Table within Reach;Phone within Reach     Discussed PT tx. Goal and d/c planning, pt's friend will drive from [facility] to [place] to  pt.  Education on gait pattern while using SPC.  Curb training onto scale at outpatient(2 inches) w/ CGA and cues.    Assessment    Pt tolerated session well despite feeling pain at R foot, pain resolved during session.scored 42/56 in Andrew assessment and still indicate pt in fall risk.    Strengths: Able to follow instructions, Alert and oriented, Effective communication skills, Pleasant and cooperative, Willingly participates in therapeutic activities, Independent prior level of function  Barriers: Fatigue, Hemiparesis, Home accessibility, Impaired balance    Plan    litegait for WS and NGOC, ambulation w/ SPC, stairs training w/ SPC, standing balance.    Passport items to be completed:  Get in/out of bed safely, in/out of a vehicle, safely use mobility device, walk or wheel around home/community, navigate up and down stairs, show how to get up/down from the ground, ensure home is accessible, demonstrate HEP, complete caregiver training    Physical Therapy Problems (Active)       Problem: Balance       Dates: Start: 06/21/22         Goal: STG-Within one week, patient will complete ANDREW balance assessment.        Dates: Start: 06/21/22               Problem: Mobility       Dates: Start: 06/21/22         Goal: STG-Within one week, patient will ambulate community distances 150 feet with SPC and SBA.        Dates: Start: 06/21/22            Goal: STG-Within one week, patient will ambulate up/down flight of stairs with single hand rail and SBA.        Dates: Start: 06/21/22               Problem: Mobility Transfers       Dates: Start: 06/21/22         Goal: STG-Within one week, patient will move supine to/from sitting independently.        Dates: Start: 06/21/22               Problem: PT-Long Term  Goals       Dates: Start: 06/21/22         Goal: LTG-By discharge, patient will ambulate 250 feet with spc vs no AD and independent.        Dates: Start: 06/21/22            Goal: LTG-By discharge, patient will transfer one surface to another with SPC or no AD and mod I.        Dates: Start: 06/21/22            Goal: LTG-By discharge, patient will ambulate up/down flight of stairs with single hand rail and mod I.        Dates: Start: 06/21/22            Goal: LTG-By discharge, patient will transfer in/out of a car mod I.        Dates: Start: 06/21/22

## 2022-06-22 NOTE — CARE PLAN
The patient is Stable - Low risk of patient condition declining or worsening      Problem: Skin Integrity  Goal: Patient's skin integrity will be maintained or improve  Note: Patient's skin remains intact and free from new or accidental injury this shift.  Will continue to monitor.      Problem: VTE Prevention  Goal: Patient will remain free from venous thromboembolism (VTE)  Note: Pt on lovenox daily for DVT prophylaxis

## 2022-06-22 NOTE — PROGRESS NOTES
"Rehab Progress Note     Date of Service: 6/22/2022  Chief Complaint: follow up stroke    Interval Events (Subjective)    Patient seen and examined today in the therapy gym.  He is working with occupational therapy on his right hand coordination.  They are setting him up for electrical stimulation with the bio [place].  Patient is wondering if some numbness he had in his right upper shoulder several months prior to her stroke could have been stroke symptoms.  He denies any neck pain or neck surgery.  Advised that this was likely coming from his neck versus strokelike symptoms based on the dermatome.    Patient has no other new concerns or complaints today.  Cardiac monitor was placed yesterday.  Is uncontrolled, hemoglobin A1c of 9.8  We will have patient's weekly conference this afternoon.    ROS: No changes to bowel, bladder, pain, mood, or sleep.         Objective:  VITAL SIGNS: /67   Pulse (!) 55   Temp 36.6 °C (97.9 °F) (Oral)   Resp 18   Ht 1.803 m (5' 11\")   Wt 88.5 kg (195 lb)   SpO2 95%   BMI 27.20 kg/m²   Gen: alert, no apparent distress  CV: Regular rate, regular rhythm  Resp: Clear to auscultation bilaterally  Neuro: Right hemiparesis, right hand most affected    Recent Results (from the past 72 hour(s))   POCT glucose device results    Collection Time: 06/19/22  4:56 PM   Result Value Ref Range    POC Glucose, Blood 160 (H) 65 - 99 mg/dL   POCT glucose device results    Collection Time: 06/19/22  8:09 PM   Result Value Ref Range    POC Glucose, Blood 178 (H) 65 - 99 mg/dL   POCT glucose device results    Collection Time: 06/20/22  8:37 AM   Result Value Ref Range    POC Glucose, Blood 137 (H) 65 - 99 mg/dL   SNF COVID Discharge (DRY NASAL SWAB IS REQUIRED)    Collection Time: 06/20/22 10:18 AM   Result Value Ref Range    SARS-CoV-2 Source Nasal Swab     SARS-COV ANTIGEN MICAELA NotDetected NotDetected   POCT glucose device results    Collection Time: 06/20/22 12:58 PM   Result Value Ref Range    " POC Glucose, Blood 167 (H) 65 - 99 mg/dL   COV-2, FLU A/B, AND RSV BY PCR (2-4 HOURS CEPHEID): Collect NP swab in VTM    Collection Time: 06/20/22  1:00 PM    Specimen: Nasopharyngeal; Respirate   Result Value Ref Range    Influenza virus A RNA Negative Negative    Influenza virus B, PCR Negative Negative    RSV, PCR Negative Negative    SARS-CoV-2 by PCR NotDetected     SARS-CoV-2 Source NP Swab    POCT glucose device results    Collection Time: 06/20/22  4:57 PM   Result Value Ref Range    POC Glucose, Blood 203 (H) 65 - 99 mg/dL   POCT glucose device results    Collection Time: 06/20/22  9:49 PM   Result Value Ref Range    POC Glucose, Blood 102 (H) 65 - 99 mg/dL   CBC with Differential    Collection Time: 06/21/22  5:30 AM   Result Value Ref Range    WBC 6.5 4.8 - 10.8 K/uL    RBC 4.94 4.70 - 6.10 M/uL    Hemoglobin 14.7 14.0 - 18.0 g/dL    Hematocrit 43.6 42.0 - 52.0 %    MCV 88.3 81.4 - 97.8 fL    MCH 29.8 27.0 - 33.0 pg    MCHC 33.7 33.7 - 35.3 g/dL    RDW 39.8 35.9 - 50.0 fL    Platelet Count 300 164 - 446 K/uL    MPV 11.2 9.0 - 12.9 fL    Neutrophils-Polys 49.10 44.00 - 72.00 %    Lymphocytes 40.30 22.00 - 41.00 %    Monocytes 8.50 0.00 - 13.40 %    Eosinophils 1.40 0.00 - 6.90 %    Basophils 0.50 0.00 - 1.80 %    Immature Granulocytes 0.20 0.00 - 0.90 %    Nucleated RBC 0.00 /100 WBC    Neutrophils (Absolute) 3.20 1.82 - 7.42 K/uL    Lymphs (Absolute) 2.62 1.00 - 4.80 K/uL    Monos (Absolute) 0.55 0.00 - 0.85 K/uL    Eos (Absolute) 0.09 0.00 - 0.51 K/uL    Baso (Absolute) 0.03 0.00 - 0.12 K/uL    Immature Granulocytes (abs) 0.01 0.00 - 0.11 K/uL    NRBC (Absolute) 0.00 K/uL   Comp Metabolic Panel (CMP)    Collection Time: 06/21/22  5:30 AM   Result Value Ref Range    Sodium 142 135 - 145 mmol/L    Potassium 3.2 (L) 3.6 - 5.5 mmol/L    Chloride 107 96 - 112 mmol/L    Co2 24 20 - 33 mmol/L    Anion Gap 11.0 7.0 - 16.0    Glucose 120 (H) 65 - 99 mg/dL    Bun 17 8 - 22 mg/dL    Creatinine 0.96 0.50 - 1.40  mg/dL    Calcium 9.4 8.5 - 10.5 mg/dL    AST(SGOT) 21 12 - 45 U/L    ALT(SGPT) 31 2 - 50 U/L    Alkaline Phosphatase 66 30 - 99 U/L    Total Bilirubin 0.6 0.1 - 1.5 mg/dL    Albumin 3.7 3.2 - 4.9 g/dL    Total Protein 7.0 6.0 - 8.2 g/dL    Globulin 3.3 1.9 - 3.5 g/dL    A-G Ratio 1.1 g/dL   Magnesium    Collection Time: 06/21/22  5:30 AM   Result Value Ref Range    Magnesium 1.7 1.5 - 2.5 mg/dL   Vitamin D, 25-hydroxy (blood)    Collection Time: 06/21/22  5:30 AM   Result Value Ref Range    25-Hydroxy   Vitamin D 25 8 (L) 30 - 100 ng/mL   ESTIMATED GFR    Collection Time: 06/21/22  5:30 AM   Result Value Ref Range    GFR (CKD-EPI) 92 >60 mL/min/1.73 m 2   POCT glucose device results    Collection Time: 06/21/22  8:01 AM   Result Value Ref Range    POC Glucose, Blood 134 (H) 65 - 99 mg/dL   POCT glucose device results    Collection Time: 06/21/22 11:24 AM   Result Value Ref Range    POC Glucose, Blood 189 (H) 65 - 99 mg/dL   POCT glucose device results    Collection Time: 06/21/22  5:21 PM   Result Value Ref Range    POC Glucose, Blood 166 (H) 65 - 99 mg/dL   POCT glucose device results    Collection Time: 06/21/22  8:43 PM   Result Value Ref Range    POC Glucose, Blood 168 (H) 65 - 99 mg/dL   PHOSPHORUS    Collection Time: 06/22/22  5:24 AM   Result Value Ref Range    Phosphorus 4.1 2.5 - 4.5 mg/dL   POTASSIUM SERUM (K)    Collection Time: 06/22/22  5:24 AM   Result Value Ref Range    Potassium 3.3 (L) 3.6 - 5.5 mmol/L   HEMOGLOBIN A1C    Collection Time: 06/22/22  5:24 AM   Result Value Ref Range    Glycohemoglobin 9.8 (H) 4.0 - 5.6 %    Est Avg Glucose 235 mg/dL   POCT glucose device results    Collection Time: 06/22/22  7:59 AM   Result Value Ref Range    POC Glucose, Blood 175 (H) 65 - 99 mg/dL   POCT glucose device results    Collection Time: 06/22/22 11:25 AM   Result Value Ref Range    POC Glucose, Blood 143 (H) 65 - 99 mg/dL       Current Facility-Administered Medications   Medication Frequency   • vitamin  D2 (Ergocalciferol) (Drisdol) capsule 50,000 Units Q7 DAYS   • insulin GLARGINE (Lantus,Semglee) injection QAM INSULIN   • insulin regular (HumuLIN R,NovoLIN R) injection 4X/DAY ACHS   • hydrOXYzine HCl (ATARAX) tablet 50 mg Q6HRS PRN   • melatonin tablet 3 mg HS PRN   • Respiratory Therapy Consult Continuous RT   • Pharmacy Consult Request ...Pain Management Review 1 Each PHARMACY TO DOSE   • hydrALAZINE (APRESOLINE) tablet 10 mg Q8HRS PRN   • acetaminophen (Tylenol) tablet 650 mg Q4HRS PRN   • lactulose 20 GM/30ML solution 30 mL QDAY PRN   • artificial tears ophthalmic solution 1 Drop PRN   • benzocaine-menthol (Cepacol) lozenge 1 Lozenge Q2HRS PRN   • mag hydrox-al hydrox-simeth (MAALOX PLUS ES or MYLANTA DS) suspension 20 mL Q2HRS PRN   • ondansetron (ZOFRAN ODT) dispertab 4 mg 4X/DAY PRN    Or   • ondansetron (ZOFRAN) syringe/vial injection 4 mg 4X/DAY PRN   • traZODone (DESYREL) tablet 50 mg QHS PRN   • sodium chloride (OCEAN) 0.65 % nasal spray 2 Spray PRN   • amLODIPine (NORVASC) tablet 10 mg Q DAY   • aspirin EC (ECOTRIN) tablet 81 mg DAILY   • atorvastatin (LIPITOR) tablet 80 mg Q EVENING   • carvedilol (COREG) tablet 25 mg BID WITH MEALS   • clopidogrel (PLAVIX) tablet 75 mg DAILY   • enoxaparin (Lovenox) inj 40 mg DAILY AT 1800   • losartan (COZAAR) tablet 100 mg Q DAY   • senna-docusate (PERICOLACE or SENOKOT S) 8.6-50 MG per tablet 2 Tablet BID    And   • polyethylene glycol/lytes (MIRALAX) PACKET 1 Packet QDAY PRN    And   • magnesium hydroxide (MILK OF MAGNESIA) suspension 30 mL QDAY PRN    And   • bisacodyl (DULCOLAX) suppository 10 mg QDAY PRN       Orders Placed This Encounter   Procedures   • Diet Order Diet: Consistent CHO (Diabetic)     Standing Status:   Standing     Number of Occurrences:   1     Order Specific Question:   Diet:     Answer:   Consistent CHO (Diabetic) [4]       Assessment:    This patient is a 56 y.o. male admitted for acute inpatient rehabilitation with Ischemic stroke  ([facility]).    I led and attended the weekly conference today, and agree with the IDT conference documentation and plan of care as noted below.    Date of conference: 6/22/2022    Goals and barriers: See IDT note.    Biggest barriers: Right hand weakness, is right-hand dominant, impaired attention, impaired balance, stairs at his friend's home [place] [facility]    CM/social support: plan to discharge to [place] with friend    Anticipated DC date: 6/29    Outpatient: OT/PT    Equip: SPC, shower chair    Follow up: PCP, [facility], Dr. Silverman, cardiology        Problem List/Medical Decision Making and Plan:    Left corona radiata ischemic stroke  Right hemiparesis  Dominant  Continue full rehab program  PT/OT/SLP, 1 hr each discipline, 5 days per week  6/21: SLP to decrease to 30, other 30 for OT as greatest impairment is his right hand    Aspirin and Plavix x 3 weeks, then aspirin alone  Statin    Cardiac monitor placed today to check for atrial fibrillation (patient with history of), though this stroke is more likely lacunar    Outpatient follow up with neurology, Dr. Silverman, cardiology, referrals made    Diabetes type 2 with hyperglycemia  Uncontrolled, HgbA1C 9.8  Glargine  Sliding scale insulin  Consult hospitalist     Diabetic peripheral neuropathy  Monitor need for gabapentin  No pain currently     Hypertension  Losartan  Carvedilol  Amlodipine  Appreciate hospitalist assistance     Paroxysmal atrial fibrillation  Carvedilol  Cardiac monitor placed 6/21  Outpatient follow up with cardiology    Hypokalemia  Supplementation     Coronary artery disease  Status post stents 2014 placed in [place]  Aspirin (on prior to admission)  Plavix (just for now as DAPT for stroke)    Vit D deficiency, 8  Continue high dose supplementation    Bowel program  Continue bowel medications  Last BM 6/21    Bladder program  Check PVRs - 26, 61  Not retaining  Bladder scan for no voids  ICP for over 400 cc  Scheduled toileting     DVT  prophylaxis  Lovenox    Total time:  40 minutes.  I spent greater than 50% of the time for patient care, counseling, and coordination on this date, including patient face-to face time, unit/floor time with review of records/pertinent lab data and studies, as well as discussing diagnostic evaluation/work up, planned therapeutic interventions, and future disposition of care, as per the interval events/subjective and the assessment and plan as noted above.        Aurora Gonzalez M.D.   Physical Medicine and Rehabilitation

## 2022-06-22 NOTE — THERAPY
Occupational Therapy  Daily Treatment     Patient Name: Reji Madrigal Jr.  Age:  56 y.o., Sex:  male  Medical Record #: [id number]  Today's Date: 6/22/2022     Precautions  Precautions: (P) Fall Risk  Comments: (P) Distal RUE weakness, ZIO patch         Subjective    Pt agreeable to OT session.      Objective       06/22/22 1031   OT Charge Group   OT Electrical Stimulation Attended 1   OT Neuromuscular Re-education / Balance 2   OT Therapy Activity 1   OT Total Time Spent   OT Individual Total Time Spent (Mins) 60   Precautions   Precautions Fall Risk   Comments Distal RUE weakness, ZIO patch   Cognition    Level of Consciousness Alert   Attention Impaired   Comments distractible in busy therapy gym environment   Strength Upper Body   Rt Shoulder Flexion Strength 4 (G)   Rt Shoulder Extension Strength 4 (G)   Rt Elbow Flexion Strength 4 (G)   Rt Elbow Extension Strength 4 (G)   Rt Wrist Flexion Strength 3+ (F+)   Rt Wrist Extension Strength 3+ (F+)   Right  Impaired  (38#)   Left    (80#)   Box and Blocks Test   Right hand blocks moved in 60 seconds 11   Left hand blocks moved in 60 seconds 34   Interdisciplinary Plan of Care Collaboration   IDT Collaboration with  Physician   Patient Position at End of Therapy Seated;Chair Alarm On;Self Releasing Lap Belt Applied;Call Light within Reach;Tray Table within Reach;Phone within Reach   Collaboration Comments CLOF, POC     Blocked practice with RUE reach/grasp/release of blocks (x10), and beanbags (x10) from tabletop, with verbal cues to exaggerate movement (especially wrist/finger extension).    Pt set up on [employer] H200 for RUE neuro re-ed with focus on functional grasp/release. Muscle parameters assessed to pt tolerance to e-stim and muscle contraction observed (28mA extensors, 17mA flexors, 2mA thenar). Pt tolerated x3 minutes of neuromodulation program (45 sec ext, 15 sec rest, 45 sec flex), and functional grasp/release x10 min with foam block (6 sec  flex, 6 sec ext). Verbal cues provided to pair volitional movement with e-stim.     Educated pt on neuroplasticity, CVA recovery, importance of repetition, benefits of e-stim.     Assessment    Pt tolerated OT session well. He demonstrates impaired R  strength and coordination/dexterity as indicated by dynamometer and BBT assessments. Pt highly motivated and participator, but was distractible in busy therapy gym environment, and required cues for task attention/redirection.   Strengths: Able to follow instructions, Alert and oriented, Effective communication skills, Good insight into deficits/needs, Independent prior level of function, Pleasant and cooperative, Willingly participates in therapeutic activities, Motivated for self care and independence  Barriers: Fatigue, Generalized weakness, Hemiparesis, Home accessibility, Hypertension, Impaired balance, Impaired activity tolerance, Poor family support (diabetes, distal RUE weakness)    Plan    ADLs, IADLs, balance, RUE neuro re-ed w/ focus on distal strength and coordination ([place], [place], Armeo Senso)      Occupational Therapy Goals (Active)       Problem: Bathing       Dates: Start: 06/21/22         Goal: STG-Within one week, patient will bathe at SPV level.        Dates: Start: 06/21/22         Goal Note filed on 06/22/22 1136 by Michelle Albarran, Student       New admit, evaluated on 6/21                 Problem: Dressing       Dates: Start: 06/21/22         Goal: STG-Within one week, patient will dress UB at SPV level using laure technique.       Dates: Start: 06/21/22         Goal Note filed on 06/22/22 1136 by Michelle Albarran, Student       New admit, evaluated on 6/21              Goal: STG-Within one week, patient will dress LB at SPV level using laure technique        Dates: Start: 06/21/22         Goal Note filed on 06/22/22 1136 by Michelle Albarran, Student       New admit, evaluated on 6/21                  Problem: Functional Transfers       Dates: Start: 06/21/22         Goal: STG-Within one week, patient will transfer to toilet at SPV level        Dates: Start: 06/21/22         Goal Note filed on 06/22/22 1136 by Michelle Albarran, Student       New admit, evaluated on 6/21                 Problem: OT Long Term Goals       Dates: Start: 06/21/22         Goal: LTG-By discharge, patient will complete basic self care tasks at Mod I - Ind level.       Dates: Start: 06/21/22            Goal: LTG-By discharge, patient will perform bathroom transfers at Mod I - Ind level.       Dates: Start: 06/21/22            Goal: LTG-By discharge, patient will complete basic home management at Mod I - Ind level.       Dates: Start: 06/21/22

## 2022-06-22 NOTE — ASSESSMENT & PLAN NOTE
Hba1c: 9.8 (6/22)  -171 (hit 200 x 1)  On Glargine  Note: Home meds include Metformin and Lantus  Note: Pt agreeable to discharging on Lantus only without resuming Metformin  Monitor

## 2022-06-22 NOTE — CARE PLAN
Problem: Bathing  Goal: STG-Within one week, patient will bathe at SPV level.   Outcome: Not Met  Note: New admit, evaluated on 6/21     Problem: Dressing  Goal: STG-Within one week, patient will dress UB at SPV level using laure technique.  Outcome: Not Met  Note: New admit, evaluated on 6/21  Goal: STG-Within one week, patient will dress LB at SPV level using laure technique   Outcome: Not Met  Note: New admit, evaluated on 6/21     Problem: Functional Transfers  Goal: STG-Within one week, patient will transfer to toilet at SPV level   Outcome: Not Met  Note: New admit, evaluated on 6/21

## 2022-06-22 NOTE — CONSULTS
HOSPITAL MEDICINE CONSULTATION    Requesting Physician:  Dr. Gonzalez    Reason for Consult:  Hypertension, Diabetes    History of Present Illness:  The patient is a 56-year-old  male with past medical history significant for coronary artery disease status post stent, atrial fibrillation on no anticoagulation,  hypertension, and insulin dependent diabetes mellitus.  He was admitted to [facility] on 6/3/22 after leaving against medical advice from [facility], where the patient was admitted five days prior for acute cerebrovascular accident with left hemiparesis.  At [facility], he was placed on dual antiplatelet and statin therapy.  Due to his ongoing functional debility, the patient was transferred to [facility] on 6/20/22.  Hospital Medicine consultation is requested to assist in the management of this patient's HTN and DM.  He is also noted to have hypokalemia.    Review of Systems:  Review of Systems   Constitutional: Negative for chills and fever.   HENT: Negative.    Eyes: Negative.    Respiratory: Negative for cough and shortness of breath.    Cardiovascular: Negative for chest pain and palpitations.   Gastrointestinal: Negative for abdominal pain, nausea and vomiting.   Musculoskeletal: Negative.    Skin: Negative for itching and rash.   Neurological: Positive for focal weakness.   Endo/Heme/Allergies: Negative for polydipsia. Does not bruise/bleed easily.   All other systems reviewed and are negative.      Allergies:  Allergies   Allergen Reactions   • Penicillins Unspecified       Medications:    Current Facility-Administered Medications:   •  potassium chloride SA (Kdur) tablet 40 mEq, 40 mEq, Oral, Once, Betsy Tinajero M.D.  •  vitamin D2 (Ergocalciferol) (Drisdol) capsule 50,000 Units, 50,000 Units, Oral, Q7 DAYS, Aurora Gonzalez M.D., 50,000 Units at 06/21/22 1124  •  insulin GLARGINE (Lantus,Semglee) injection, 18 Units, Subcutaneous, M  INSULIN, Betsy Tinajero M.D., 18 Units at 06/22/22 0800  •  POC Blood Glucose, , , Q AC AND BEDTIME(S) **AND** insulin regular (HumuLIN R,NovoLIN R) injection, 2-12 Units, Subcutaneous, 4X/DAY ACHS, Betsy Tinajero M.D., 2 Units at 06/22/22 0800  •  hydrOXYzine HCl (ATARAX) tablet 50 mg, 50 mg, Oral, Q6HRS PRN, Aurora Gonzalez M.D.  •  melatonin tablet 3 mg, 3 mg, Oral, HS PRN, Aurora Gonzalez M.D.  •  Respiratory Therapy Consult, , Nebulization, Continuous RT, Aurora Gonzalez M.D.  •  Pharmacy Consult Request ...Pain Management Review 1 Each, 1 Each, Other, PHARMACY TO DOSE, Aurora Gonzalez M.D.  •  hydrALAZINE (APRESOLINE) tablet 10 mg, 10 mg, Oral, Q8HRS PRN, Aurora Gonzalez M.D.  •  acetaminophen (Tylenol) tablet 650 mg, 650 mg, Oral, Q4HRS PRN, Aurora Gonzalez M.D.  •  lactulose 20 GM/30ML solution 30 mL, 30 mL, Oral, QDAY PRN, Aurora Gonzalez M.D.  •  artificial tears ophthalmic solution 1 Drop, 1 Drop, Both Eyes, PRN, Aurora Gonzalez M.D.  •  benzocaine-menthol (Cepacol) lozenge 1 Lozenge, 1 Lozenge, Mouth/Throat, Q2HRS PRN, Aurora Gonzalez M.D.  •  mag hydrox-al hydrox-simeth (MAALOX PLUS ES or MYLANTA DS) suspension 20 mL, 20 mL, Oral, Q2HRS PRN, Aurora Gonzalez M.D.  •  ondansetron (ZOFRAN ODT) dispertab 4 mg, 4 mg, Oral, 4X/DAY PRN **OR** ondansetron (ZOFRAN) syringe/vial injection 4 mg, 4 mg, Intramuscular, 4X/DAY PRN, Aurora Gonzalez M.D.  •  traZODone (DESYREL) tablet 50 mg, 50 mg, Oral, QHS PRN, Aurora Gonzalez M.D.  •  sodium chloride (OCEAN) 0.65 % nasal spray 2 Spray, 2 Spray, Nasal, PRN, Aurora Gonzalez M.D.  •  amLODIPine (NORVASC) tablet 10 mg, 10 mg, Oral, Q DAY, Aurora Gonzalez M.D., 10 mg at 06/22/22 0540  •  aspirin EC (ECOTRIN) tablet 81 mg, 81 mg, Oral, DAILY, Aurora Gonzalez M.D., 81 mg at 06/22/22 0806  •  atorvastatin (LIPITOR) tablet 80 mg, 80 mg, Oral, Q EVENING, Aurora Gonzalez M.D., 80 mg at 06/21/22 2044  •  carvedilol (COREG) tablet 25 mg, 25 mg,  Oral, BID WITH MEALS, Aurora Gonzalez M.D., 25 mg at 06/22/22 0805  •  clopidogrel (PLAVIX) tablet 75 mg, 75 mg, Oral, DAILY, Aurora Gonzalez M.D., 75 mg at 06/22/22 0806  •  enoxaparin (Lovenox) inj 40 mg, 40 mg, Subcutaneous, DAILY AT 1800, Aurora Gonzalez M.D., 40 mg at 06/21/22 1723  •  losartan (COZAAR) tablet 100 mg, 100 mg, Oral, Q DAY, Aurora Gonzalez M.D., 100 mg at 06/22/22 0540  •  senna-docusate (PERICOLACE or SENOKOT S) 8.6-50 MG per tablet 2 Tablet, 2 Tablet, Oral, BID, 2 Tablet at 06/22/22 0805 **AND** polyethylene glycol/lytes (MIRALAX) PACKET 1 Packet, 1 Packet, Oral, QDAY PRN **AND** magnesium hydroxide (MILK OF MAGNESIA) suspension 30 mL, 30 mL, Oral, QDAY PRN **AND** bisacodyl (DULCOLAX) suppository 10 mg, 10 mg, Rectal, QDAY PRN, Aurora Gonzalez M.D.    Past Medical/Surgical History:  Past Medical History:   Diagnosis Date   • Diabetes (HCC)    • Hypertension    • Stroke (HCC)      No past surgical history.    Social History:  Social History     Socioeconomic History   • Marital status:      Spouse name: Not on file   • Number of children: Not on file   • Years of education: Not on file   • Highest education level: Not on file   Occupational History   • Not on file   Tobacco Use   • Smoking status: Never Smoker   • Smokeless tobacco: Never Used   Vaping Use   • Vaping Use: Never used   Substance and Sexual Activity   • Alcohol use: Yes     Comment: occ   • Drug use: Never   • Sexual activity: Not on file   Other Topics Concern   • Not on file   Social History Narrative   • Not on file     Social Determinants of Health     Financial Resource Strain: Not on file   Food Insecurity: Not on file   Transportation Needs: Not on file   Physical Activity: Not on file   Stress: Not on file   Social Connections: Not on file   Intimate Partner Violence: Not on file   Housing Stability: Not on file       Family History:  Hypertension    Physical Examination:   Vitals:    06/21/22 1300  06/21/22 1850 06/22/22 0540 06/22/22 0635   BP: 102/60 129/72 120/70 120/67   Pulse: 94 63 (!) 52 (!) 55   Resp: 18 19 18 18   Temp: 37.1 °C (98.7 °F) 36.8 °C (98.3 °F)  36.6 °C (97.9 °F)   TempSrc: Temporal Oral  Oral   SpO2: 94% 96% 95%    Weight:       Height:           Physical Exam  Vitals reviewed.   Constitutional:       General: He is not in acute distress.     Appearance: Normal appearance. He is not ill-appearing.   HENT:      Head: Normocephalic and atraumatic.      Right Ear: External ear normal.      Left Ear: External ear normal.      Nose: Nose normal.      Mouth/Throat:      Pharynx: Oropharynx is clear.   Eyes:      General:         Right eye: No discharge.         Left eye: No discharge.      Extraocular Movements: Extraocular movements intact.      Conjunctiva/sclera: Conjunctivae normal.   Cardiovascular:      Rate and Rhythm: Rhythm irregular.   Pulmonary:      Effort: Pulmonary effort is normal. No respiratory distress.      Breath sounds: Normal breath sounds. No wheezing.   Abdominal:      General: Bowel sounds are normal. There is no distension.      Palpations: Abdomen is soft.      Tenderness: There is no abdominal tenderness.   Musculoskeletal:      Cervical back: Normal range of motion and neck supple.      Right lower leg: No edema.      Left lower leg: No edema.   Skin:     General: Skin is warm and dry.   Neurological:      Mental Status: He is alert and oriented to person, place, and time.         Laboratory Data:  Recent Labs     06/21/22  0530   WBC 6.5   RBC 4.94   HEMOGLOBIN 14.7   HEMATOCRIT 43.6   MCV 88.3   MCH 29.8   MCHC 33.7   RDW 39.8   PLATELETCT 300   MPV 11.2     Recent Labs     06/21/22  0530 06/22/22  0524   SODIUM 142  --    POTASSIUM 3.2* 3.3*   CHLORIDE 107  --    CO2 24  --    GLUCOSE 120*  --    BUN 17  --    CREATININE 0.96  --    CALCIUM 9.4  --            Impressions/Recommendations:  Vitamin D deficiency  Vit D level 8  On high dose supplementation per  Physiatry    Type 2 diabetes mellitus with hyperglycemia, with long-term current use of insulin (HCC)  Check HbA1c  Increase Glargine and adjust SSI  Outpt meds include Metformin and Lantus    Primary hypertension  On Norvasc, Coreg, and Losartan  Observe blood pressure trends    Paroxysmal atrial fibrillation (HCC)  Rate controlled on Coreg  On DAPT  ZioPatch placed per Physiatry    Ischemic stroke (HCC)  Has L HP  On ASA, Plavix, and Lipitor  Ongoing management per Physiatry    Coronary artery disease involving native coronary artery of native heart without angina pectoris  H/O stent  On ASA, Plavix, Lipitor, Coreg, and Losartan    Hypokalemia  Replete K+  Mg++ normal    Full Code    Thank you for the opportunity to assist in this patient's care.  We will continue to follow along with you.

## 2022-06-22 NOTE — THERAPY
"Occupational Therapy  Daily Treatment     Patient Name: Reji Madrigal Jr.  Age:  56 y.o., Sex:  male  Medical Record #: [id number]  Today's Date: 6/22/2022     Precautions  Precautions: (P) Fall Risk  Comments: (P) Distal RUE weakness, ZIO patch         Subjective    Pt sitting up in w/c upon arrival, agreeable to OT session. \"Can I walk to the gym?\"     \"I need to be able to use a mouse and computer for work.\"      Objective     06/22/22 1401   OT Charge Group   OT Neuromuscular Re-education / Balance 2   OT Total Time Spent   OT Individual Total Time Spent (Mins) 30   Precautions   Precautions Fall Risk   Comments Distal RUE weakness, ZIO patch   Hand Strengthening   Hand Strengthening Right    Comment Orange theraputty given for gross  and log rolls   Neuro-Muscular Treatments   Neuro-Muscular Treatments Facilitation;Sequencing   Comments Functional grasp and release to  large cup then lift up towards mouth to target finger extension/flexion motor control for functional activities. Required stabilization of cup by therapist or pt's RUE   Bed Mobility    Sit to Supine Standby Assist   Interdisciplinary Plan of Care Collaboration   IDT Collaboration with  Occupational Therapist   Patient Position at End of Therapy In Bed;Call Light within Reach;Tray Table within Reach;Phone within Reach   Collaboration Comments POC, CLOF discussed with primary OT     Functional mobility from room <> main gym with FWW and CGA.     Educated pt on trialing use of built-up utensils for self-feeding as pt reported he has primarily only been using his LUE to eat despite being R handed. Pt trialed standard built-up and a t- utensil by digging into orange theraputty to simulate self-feeding. Pt would benefit from trialing adaptive utensils during a meal.     Educated pt on tenants of neuroplasticity including \"use it or lose it\" concept and importance of using his RUE during functional tasks throughout the day for " neuro re-ed and to maximize functional gains.     Assessment    Pt tolerated OT session well. Had difficulty holding the built-up utensil with a tripod grasp and required assist from his R hand to place the utensil to avoid using a gross grasp. Pt would benefit from further follow-up and training on use of adaptive equipment to determine efficacy during self-feeding. Pt was able to actively extend and flex his L fingers to grasp and lift the cup but required stabilization of the cup due to poor motor control. Pt was receptive to neuroplasticity education and would benefit from more in-depth education.     Strengths: Able to follow instructions, Alert and oriented, Effective communication skills, Good insight into deficits/needs, Independent prior level of function, Pleasant and cooperative, Willingly participates in therapeutic activities, Motivated for self care and independence  Barriers: Fatigue, Generalized weakness, Hemiparesis, Home accessibility, Hypertension, Impaired balance, Impaired activity tolerance, Poor family support (diabetes, distal RUE weakness)    Plan    ADLs, IADLs, balance, RUE neuro re-ed w/ focus on distal strength and coordination ([place], [place], Armeo Senso)    Passport items to be completed:  Perform bathroom transfers, complete dressing, complete feeding, get ready for the day, prepare a simple meal, participate in household tasks, adapt home for safety needs, demonstrate home exercise program, complete caregiver training     Occupational Therapy Goals (Active)       Problem: Bathing       Dates: Start: 06/21/22         Goal: STG-Within one week, patient will bathe at SPV level.        Dates: Start: 06/21/22         Goal Note filed on 06/22/22 1136 by Michelle Albarran, Student       New admit, evaluated on 6/21                 Problem: Dressing       Dates: Start: 06/21/22         Goal: STG-Within one week, patient will dress UB at SPV level using laure technique.       Dates:  Start: 06/21/22         Goal Note filed on 06/22/22 1136 by Michelle Albarran, Student       New admit, evaluated on 6/21              Goal: STG-Within one week, patient will dress LB at SPV level using laure technique        Dates: Start: 06/21/22         Goal Note filed on 06/22/22 1136 by Michelle Albarran, Student       New admit, evaluated on 6/21                 Problem: Functional Transfers       Dates: Start: 06/21/22         Goal: STG-Within one week, patient will transfer to toilet at SPV level        Dates: Start: 06/21/22         Goal Note filed on 06/22/22 1136 by Michelle Albarran, Student       New admit, evaluated on 6/21                 Problem: OT Long Term Goals       Dates: Start: 06/21/22         Goal: LTG-By discharge, patient will complete basic self care tasks at Mod I - Ind level.       Dates: Start: 06/21/22            Goal: LTG-By discharge, patient will perform bathroom transfers at Mod I - Ind level.       Dates: Start: 06/21/22            Goal: LTG-By discharge, patient will complete basic home management at Mod I - Ind level.       Dates: Start: 06/21/22

## 2022-06-22 NOTE — PROGRESS NOTES
Received bedside shift report from Usha HALL RN regarding patient and assumed care. Patient awake, calm and stable, currently positioned in bed for comfort and safety; call light within reach. Denies pain or discomfort at this time. Will continue to monito

## 2022-06-22 NOTE — PROGRESS NOTES
Hospital Medicine Daily Progress Note      Chief Complaint  Hypertension  Diabetes    Interval Problem Update  Pt denies new complaints.  Labs reviewed.    Review of Systems  Review of Systems   Constitutional: Negative for chills and fever.   HENT: Negative.    Eyes: Negative.    Respiratory: Negative for cough and shortness of breath.    Cardiovascular: Negative for chest pain and palpitations.   Gastrointestinal: Negative for abdominal pain, nausea and vomiting.   Musculoskeletal: Negative.    Skin: Negative for itching and rash.   Neurological: Positive for focal weakness.   Endo/Heme/Allergies: Negative for polydipsia. Does not bruise/bleed easily.        Physical Exam  Temp:  [36.6 °C (97.9 °F)-36.8 °C (98.3 °F)] 36.6 °C (97.9 °F)  Pulse:  [52-63] 55  Resp:  [18-19] 18  BP: (120-129)/(67-72) 120/67  SpO2:  [95 %-96 %] 95 %    Physical Exam  Vitals reviewed.   Constitutional:       General: He is not in acute distress.     Appearance: Normal appearance. He is not ill-appearing.   HENT:      Head: Normocephalic and atraumatic.      Right Ear: External ear normal.      Left Ear: External ear normal.      Nose: Nose normal.      Mouth/Throat:      Pharynx: Oropharynx is clear.   Eyes:      General:         Right eye: No discharge.         Left eye: No discharge.      Extraocular Movements: Extraocular movements intact.      Conjunctiva/sclera: Conjunctivae normal.   Cardiovascular:      Rate and Rhythm: Rhythm irregular.   Pulmonary:      Effort: Pulmonary effort is normal. No respiratory distress.      Breath sounds: Normal breath sounds. No wheezing.   Abdominal:      General: Bowel sounds are normal. There is no distension.      Palpations: Abdomen is soft.      Tenderness: There is no abdominal tenderness.   Musculoskeletal:      Cervical back: Normal range of motion and neck supple.      Right lower leg: No edema.      Left lower leg: No edema.   Skin:     General: Skin is warm and dry.   Neurological:       Mental Status: He is alert and oriented to person, place, and time.         Fluids    Intake/Output Summary (Last 24 hours) at 6/22/2022 1326  Last data filed at 6/22/2022 0831  Gross per 24 hour   Intake 740 ml   Output 800 ml   Net -60 ml       Laboratory  Recent Labs     06/21/22  0530   WBC 6.5   RBC 4.94   HEMOGLOBIN 14.7   HEMATOCRIT 43.6   MCV 88.3   MCH 29.8   MCHC 33.7   RDW 39.8   PLATELETCT 300   MPV 11.2     Recent Labs     06/21/22  0530 06/22/22  0524   SODIUM 142  --    POTASSIUM 3.2* 3.3*   CHLORIDE 107  --    CO2 24  --    GLUCOSE 120*  --    BUN 17  --    CREATININE 0.96  --    CALCIUM 9.4  --                  Assessment/Plan  * Ischemic stroke (HCC)- (present on admission)  Assessment & Plan  Has L HP  On ASA, Plavix, and Lipitor  Ongoing management per Physiatry    Hypokalemia  Assessment & Plan  Continue to replete K+  Mg++ normal    Vitamin D deficiency  Assessment & Plan  Vit D level 8  On high dose supplementation per Physiatry    Coronary artery disease involving native coronary artery of native heart without angina pectoris- (present on admission)  Assessment & Plan  H/O stent  On ASA, Plavix, Lipitor, Coreg, and Losartan    Paroxysmal atrial fibrillation (HCC)- (present on admission)  Assessment & Plan  Rate controlled on Coreg  On DAPT  ZioPatch placed per Physiatry    Type 2 diabetes mellitus with hyperglycemia, with long-term current use of insulin (HCC)- (present on admission)  Assessment & Plan  HbA1c 9.8  Observe serum glucose trends on Glargine and SSI  Outpt meds include Metformin and Lantus    Primary hypertension- (present on admission)  Assessment & Plan  On Norvasc, Coreg, and Losartan  Observe blood pressure trends     Full Code

## 2022-06-22 NOTE — CARE PLAN
Problem: Problem Solving STGs  Goal: STG-Within one week, patient will complete complex executive function tasks with 90% accuracy provided SPV  Outcome: Not Met  Note: Recent eval     Problem: Memory STGs  Goal: STG-Within one week, patient will learn and implement functional memory strategies to assist in recall of information related to stroke, hospitalization and daily planning with 80% provided SPV  Outcome: Not Met  Note: Recent eval     Problem: Memory STGs  Goal: STG-Within one week, patient will learn and implement functional memory strategies to assist in recall of information related to stroke, hospitalization and daily planning with 80% provided SPV  Outcome: Not Met  Note: Recent eval

## 2022-06-22 NOTE — CARE PLAN
Problem: Mobility  Goal: STG-Within one week, patient will ambulate community distances 150 feet with SPC and SBA.   Outcome: Not Met  Note: Evaluated yesterday, currently required CGA  Goal: STG-Within one week, patient will ambulate up/down flight of stairs with single hand rail and SBA.   Outcome: Not Met  Note: Evaluated yesterday, have not attempted     Problem: Mobility Transfers  Goal: STG-Within one week, patient will move supine to/from sitting independently.   Outcome: Not Met  Note: Currently SBA     Problem: Balance  Goal: STG-Within one week, patient will complete ANDREW balance assessment.   Outcome: Met

## 2022-06-22 NOTE — THERAPY
Speech Language Pathology  Daily Treatment     Patient Name: Reji Madrigal Jr.  Age:  56 y.o., Sex:  male  Medical Record #: [id number]  Today's Date: 6/22/2022     Precautions  Precautions: Fall Risk  Comments: Distal RUE weakness, ZIO patch    Subjective    Pt pleasant and cooperative.      Objective       06/22/22 1303   Treatment Charges   SLP Cognitive Skill Development First 15 Minutes 1   SLP Cognitive Skill Development Additional 15 Minutes 1   SLP Total Time Spent   SLP Individual Total Time Spent (Mins) 30       Assessment    Complex executive functioning skills addressed with use of planning a trip task to [place]. Pt completed parts of the task however unable to complete in its entirity. Pt asking appropriate questions throughout for clarification. For the portions that were completed thus far calculations and attention to detail were 100% accurate indep. Pt to [facility] task for home work to determine total cost of trip as well as individual cost.    Strengths: Able to follow instructions, Making steady progress towards goals, Pleasant and cooperative, Willingly participates in therapeutic activities, Motivated for self care and independence  Barriers: Impaired functional cognition    Plan    Cont complex executive functioning and stroke education.    Speech Therapy Problems (Active)       Problem: Memory STGs       Dates: Start: 06/21/22         Goal: STG-Within one week, patient will learn and implement functional memory strategies to assist in recall of information related to stroke, hospitalization and daily planning with 80% provided SPV       Dates: Start: 06/21/22         Goal Note filed on 06/22/22 1308 by Luz Maria Torres MS,CCC-SLP       Recent eval                 Problem: Problem Solving STGs       Dates: Start: 06/21/22         Goal: STG-Within one week, patient will complete complex executive function tasks with 90% accuracy provided SPV       Dates: Start: 06/21/22         Goal Note filed  on 06/22/22 1308 by Luz Maria Torres MS,CCC-SLP       Recent eval                 Problem: Speech/Swallowing LTGs       Dates: Start: 06/21/22         Goal: LTG-By discharge, patient will complete complex problem solving and recall information with 80% accuracy and MOD I       Dates: Start: 06/21/22

## 2022-06-22 NOTE — THERAPY
Physical Therapy   Daily Treatment     Patient Name: Reji Madrigal Jr.  Age:  56 y.o., Sex:  male  Medical Record #: [id number]  Today's Date: 6/22/2022     Precautions  Precautions: Fall Risk    Subjective    Pt agreeable to therapy. He would like to change his shorts at the beginning of therapy     Objective     06/22/22 0931   PT Charge Group   PT Gait Training 1   PT Neuromuscular Re-Education / Balance 1   Supervising Physical Therapist Kurt Brady   PT Total Time Spent   PT Individual Total Time Spent (Mins) 30   Cognition    Attention Impaired   Comments R inattention   Gait Functional Level of Assist    Gait Level Of Assist Contact Guard Assist   Assistive Device Single Point Cane   Distance (Feet) 325   # of Times Distance was Traveled 1   Deviation Step To;Bradykinetic;Decreased Heel Strike;Decreased Base Of Support   Transfer Functional Level of Assist   Bed, Chair, Wheelchair Transfer Contact Guard Assist   Bed Chair Wheelchair Transfer Description Squat pivot transfer to wheelchair   Neuro-Muscular Treatments   Neuro-Muscular Treatments Verbal Cuing;Sequencing   Comments metronome at 70bpm during gait training for decreased discontinuity with amb during gait training   Interdisciplinary Plan of Care Collaboration   IDT Collaboration with  Physical Therapist Assistant (PTA)   Patient Position at End of Therapy Seated;Self Releasing Lap Belt Applied   Collaboration Comments CLOF      Set up and CGA for SQPT  to the R    Sit > supine SBA    Pt doffed and re-donned his shorts seated at EOB      Assessment    Pt tolerated tx session well, impaired attention to the R. Demos gait at CGA/SBA level using a SPC with good response to audible cues/metronome during gait training  Strengths: Able to follow instructions, Alert and oriented, Effective communication skills, Pleasant and cooperative, Willingly participates in therapeutic activities, Independent prior level of function  Barriers: Fatigue, Hemiparesis,  Home accessibility, Impaired balance    Plan      litegait for WS and NGOC, ambulation w/ SPC, stairs training w/ SPC, standing balance.     Passport items to be completed:  Get in/out of bed safely, in/out of a vehicle, safely use mobility device, walk or wheel around home/community, navigate up and down stairs, show how to get up/down from the ground, ensure home is accessible, demonstrate HEP, complete caregiver training    Physical Therapy Problems (Active)       Problem: Mobility       Dates: Start: 06/21/22         Goal: STG-Within one week, patient will ambulate community distances 150 feet with SPC and SBA.        Dates: Start: 06/21/22         Goal Note filed on 06/22/22 0808 by Maggie Saul PTA       Evaluated yesterday, currently required CGA              Goal: STG-Within one week, patient will ambulate up/down flight of stairs with single hand rail and SBA.        Dates: Start: 06/21/22         Goal Note filed on 06/22/22 0808 by Maggie Saul PTA       Evaluated yesterday, have not attempted                 Problem: Mobility Transfers       Dates: Start: 06/21/22         Goal: STG-Within one week, patient will move supine to/from sitting independently.        Dates: Start: 06/21/22         Goal Note filed on 06/22/22 0808 by Maggie Saul PTA       Currently SBA                 Problem: PT-Long Term Goals       Dates: Start: 06/21/22         Goal: LTG-By discharge, patient will ambulate 250 feet with spc vs no AD and independent.        Dates: Start: 06/21/22            Goal: LTG-By discharge, patient will transfer one surface to another with SPC or no AD and mod I.        Dates: Start: 06/21/22            Goal: LTG-By discharge, patient will ambulate up/down flight of stairs with single hand rail and mod I.        Dates: Start: 06/21/22            Goal: LTG-By discharge, patient will transfer in/out of a car mod I.        Dates: Start: 06/21/22

## 2022-06-23 ENCOUNTER — TELEPHONE (OUTPATIENT)
Dept: SCHEDULING | Facility: IMAGING CENTER | Age: 57
End: 2022-06-23
Payer: COMMERCIAL

## 2022-06-23 LAB
GLUCOSE BLD STRIP.AUTO-MCNC: 134 MG/DL (ref 65–99)
GLUCOSE BLD STRIP.AUTO-MCNC: 152 MG/DL (ref 65–99)
GLUCOSE BLD STRIP.AUTO-MCNC: 166 MG/DL (ref 65–99)
GLUCOSE BLD STRIP.AUTO-MCNC: 198 MG/DL (ref 65–99)

## 2022-06-23 PROCEDURE — 82962 GLUCOSE BLOOD TEST: CPT

## 2022-06-23 PROCEDURE — 97130 THER IVNTJ EA ADDL 15 MIN: CPT

## 2022-06-23 PROCEDURE — 97110 THERAPEUTIC EXERCISES: CPT | Mod: CQ

## 2022-06-23 PROCEDURE — 700111 HCHG RX REV CODE 636 W/ 250 OVERRIDE (IP): Performed by: PHYSICAL MEDICINE & REHABILITATION

## 2022-06-23 PROCEDURE — A9270 NON-COVERED ITEM OR SERVICE: HCPCS | Performed by: PHYSICAL MEDICINE & REHABILITATION

## 2022-06-23 PROCEDURE — 99231 SBSQ HOSP IP/OBS SF/LOW 25: CPT | Performed by: HOSPITALIST

## 2022-06-23 PROCEDURE — 97112 NEUROMUSCULAR REEDUCATION: CPT

## 2022-06-23 PROCEDURE — 770010 HCHG ROOM/CARE - REHAB SEMI PRIVAT*

## 2022-06-23 PROCEDURE — 97530 THERAPEUTIC ACTIVITIES: CPT | Mod: CQ

## 2022-06-23 PROCEDURE — 700102 HCHG RX REV CODE 250 W/ 637 OVERRIDE(OP): Performed by: PHYSICAL MEDICINE & REHABILITATION

## 2022-06-23 PROCEDURE — 97535 SELF CARE MNGMENT TRAINING: CPT

## 2022-06-23 PROCEDURE — 99232 SBSQ HOSP IP/OBS MODERATE 35: CPT | Performed by: PHYSICAL MEDICINE & REHABILITATION

## 2022-06-23 PROCEDURE — 97116 GAIT TRAINING THERAPY: CPT | Mod: CQ

## 2022-06-23 PROCEDURE — 97129 THER IVNTJ 1ST 15 MIN: CPT

## 2022-06-23 RX ADMIN — SENNOSIDES AND DOCUSATE SODIUM 2 TABLET: 50; 8.6 TABLET ORAL at 21:18

## 2022-06-23 RX ADMIN — CARVEDILOL 25 MG: 12.5 TABLET, FILM COATED ORAL at 17:08

## 2022-06-23 RX ADMIN — ATORVASTATIN CALCIUM 80 MG: 40 TABLET, FILM COATED ORAL at 21:18

## 2022-06-23 RX ADMIN — CLOPIDOGREL BISULFATE 75 MG: 75 TABLET ORAL at 09:38

## 2022-06-23 RX ADMIN — AMLODIPINE BESYLATE 10 MG: 5 TABLET ORAL at 05:37

## 2022-06-23 RX ADMIN — CARVEDILOL 25 MG: 12.5 TABLET, FILM COATED ORAL at 09:38

## 2022-06-23 RX ADMIN — LOSARTAN POTASSIUM 100 MG: 25 TABLET, FILM COATED ORAL at 05:37

## 2022-06-23 RX ADMIN — ENOXAPARIN SODIUM 40 MG: 40 INJECTION SUBCUTANEOUS at 17:09

## 2022-06-23 RX ADMIN — ASPIRIN 81 MG: 81 TABLET, COATED ORAL at 09:39

## 2022-06-23 ASSESSMENT — GAIT ASSESSMENTS
GAIT LEVEL OF ASSIST: CONTACT GUARD ASSIST
ASSISTIVE DEVICE: SINGLE POINT CANE;OTHER (COMMENTS)

## 2022-06-23 ASSESSMENT — ACTIVITIES OF DAILY LIVING (ADL)
BED_CHAIR_WHEELCHAIR_TRANSFER_DESCRIPTION: ADAPTIVE EQUIPMENT;INCREASED TIME;SUPERVISION FOR SAFETY;VERBAL CUEING
TOILET_TRANSFER_DESCRIPTION: GRAB BAR;ADAPTIVE EQUIPMENT;SUPERVISION FOR SAFETY;VERBAL CUEING
TOILETING_LEVEL_OF_ASSIST_DESCRIPTION: GRAB BAR;INCREASED TIME;VERBAL CUEING;SET-UP OF EQUIPMENT;SUPERVISION FOR SAFETY

## 2022-06-23 NOTE — PROGRESS NOTES
"Rehab Progress Note     Date of Service: 6/23/2022  Chief Complaint: follow up stroke    Interval Events (Subjective)    Patient seen and examined today in his room.   We discussed his discharge date of the 29th.  He confirms he's going back to [place] until he recovers.  Explained he will need to get a PCP there.  Discussed patient cannot return to driving until cleared by a physician.   He does need a glucometer at discharge.  Cardiac monitor company called. Device has not been registered to patient.   He has no other acute concerns or complaints today.     ROS: No changes to bowel, bladder, pain, mood, or sleep.           Objective:  VITAL SIGNS: /65   Pulse 60   Temp 36.4 °C (97.6 °F) (Oral)   Resp 16   Ht 1.803 m (5' 11\")   Wt 88.5 kg (195 lb)   SpO2 97%   BMI 27.20 kg/m²   Gen: alert, no apparent distress  CV: Regular rate, regular rhythm  Resp: Clear to auscultation bilaterally  Neuro: right hand weakness    Recent Results (from the past 72 hour(s))   POCT glucose device results    Collection Time: 06/20/22 12:58 PM   Result Value Ref Range    POC Glucose, Blood 167 (H) 65 - 99 mg/dL   COV-2, FLU A/B, AND RSV BY PCR (2-4 HOURS CEPHEID): Collect [facility] swab in [facility]    Collection Time: 06/20/22  1:00 PM    Specimen: Nasopharyngeal; Respirate   Result Value Ref Range    Influenza virus A RNA Negative Negative    Influenza virus B, PCR Negative Negative    RSV, PCR Negative Negative    SARS-CoV-2 by PCR NotDetected     SARS-CoV-2 Source NP Swab    POCT glucose device results    Collection Time: 06/20/22  4:57 PM   Result Value Ref Range    POC Glucose, Blood 203 (H) 65 - 99 mg/dL   POCT glucose device results    Collection Time: 06/20/22  9:49 PM   Result Value Ref Range    POC Glucose, Blood 102 (H) 65 - 99 mg/dL   CBC with Differential    Collection Time: 06/21/22  5:30 AM   Result Value Ref Range    WBC 6.5 4.8 - 10.8 K/uL    RBC 4.94 4.70 - 6.10 M/uL    Hemoglobin 14.7 14.0 - 18.0 g/dL    " Hematocrit 43.6 42.0 - 52.0 %    MCV 88.3 81.4 - 97.8 fL    MCH 29.8 27.0 - 33.0 pg    MCHC 33.7 33.7 - 35.3 g/dL    RDW 39.8 35.9 - 50.0 fL    Platelet Count 300 164 - 446 K/uL    MPV 11.2 9.0 - 12.9 fL    Neutrophils-Polys 49.10 44.00 - 72.00 %    Lymphocytes 40.30 22.00 - 41.00 %    Monocytes 8.50 0.00 - 13.40 %    Eosinophils 1.40 0.00 - 6.90 %    Basophils 0.50 0.00 - 1.80 %    Immature Granulocytes 0.20 0.00 - 0.90 %    Nucleated RBC 0.00 /100 WBC    Neutrophils (Absolute) 3.20 1.82 - 7.42 K/uL    Lymphs (Absolute) 2.62 1.00 - 4.80 K/uL    Monos (Absolute) 0.55 0.00 - 0.85 K/uL    Eos (Absolute) 0.09 0.00 - 0.51 K/uL    Baso (Absolute) 0.03 0.00 - 0.12 K/uL    Immature Granulocytes (abs) 0.01 0.00 - 0.11 K/uL    NRBC (Absolute) 0.00 K/uL   Comp Metabolic Panel (CMP)    Collection Time: 06/21/22  5:30 AM   Result Value Ref Range    Sodium 142 135 - 145 mmol/L    Potassium 3.2 (L) 3.6 - 5.5 mmol/L    Chloride 107 96 - 112 mmol/L    Co2 24 20 - 33 mmol/L    Anion Gap 11.0 7.0 - 16.0    Glucose 120 (H) 65 - 99 mg/dL    Bun 17 8 - 22 mg/dL    Creatinine 0.96 0.50 - 1.40 mg/dL    Calcium 9.4 8.5 - 10.5 mg/dL    AST(SGOT) 21 12 - 45 U/L    ALT(SGPT) 31 2 - 50 U/L    Alkaline Phosphatase 66 30 - 99 U/L    Total Bilirubin 0.6 0.1 - 1.5 mg/dL    Albumin 3.7 3.2 - 4.9 g/dL    Total Protein 7.0 6.0 - 8.2 g/dL    Globulin 3.3 1.9 - 3.5 g/dL    A-G Ratio 1.1 g/dL   Magnesium    Collection Time: 06/21/22  5:30 AM   Result Value Ref Range    Magnesium 1.7 1.5 - 2.5 mg/dL   Vitamin D, 25-hydroxy (blood)    Collection Time: 06/21/22  5:30 AM   Result Value Ref Range    25-Hydroxy   Vitamin D 25 8 (L) 30 - 100 ng/mL   ESTIMATED GFR    Collection Time: 06/21/22  5:30 AM   Result Value Ref Range    GFR (CKD-EPI) 92 >60 mL/min/1.73 m 2   POCT glucose device results    Collection Time: 06/21/22  8:01 AM   Result Value Ref Range    POC Glucose, Blood 134 (H) 65 - 99 mg/dL   POCT glucose device results    Collection Time: 06/21/22  11:24 AM   Result Value Ref Range    POC Glucose, Blood 189 (H) 65 - 99 mg/dL   POCT glucose device results    Collection Time: 06/21/22  5:21 PM   Result Value Ref Range    POC Glucose, Blood 166 (H) 65 - 99 mg/dL   POCT glucose device results    Collection Time: 06/21/22  8:43 PM   Result Value Ref Range    POC Glucose, Blood 168 (H) 65 - 99 mg/dL   PHOSPHORUS    Collection Time: 06/22/22  5:24 AM   Result Value Ref Range    Phosphorus 4.1 2.5 - 4.5 mg/dL   POTASSIUM SERUM (K)    Collection Time: 06/22/22  5:24 AM   Result Value Ref Range    Potassium 3.3 (L) 3.6 - 5.5 mmol/L   HEMOGLOBIN A1C    Collection Time: 06/22/22  5:24 AM   Result Value Ref Range    Glycohemoglobin 9.8 (H) 4.0 - 5.6 %    Est Avg Glucose 235 mg/dL   POCT glucose device results    Collection Time: 06/22/22  7:59 AM   Result Value Ref Range    POC Glucose, Blood 175 (H) 65 - 99 mg/dL   POCT glucose device results    Collection Time: 06/22/22 11:25 AM   Result Value Ref Range    POC Glucose, Blood 143 (H) 65 - 99 mg/dL   POCT glucose device results    Collection Time: 06/22/22  5:08 PM   Result Value Ref Range    POC Glucose, Blood 125 (H) 65 - 99 mg/dL   POCT glucose device results    Collection Time: 06/22/22  8:48 PM   Result Value Ref Range    POC Glucose, Blood 156 (H) 65 - 99 mg/dL   POCT glucose device results    Collection Time: 06/23/22  8:03 AM   Result Value Ref Range    POC Glucose, Blood 166 (H) 65 - 99 mg/dL   POCT glucose device results    Collection Time: 06/23/22 11:10 AM   Result Value Ref Range    POC Glucose, Blood 134 (H) 65 - 99 mg/dL       Current Facility-Administered Medications   Medication Frequency   • vitamin D2 (Ergocalciferol) (Drisdol) capsule 50,000 Units Q7 DAYS   • insulin GLARGINE (Lantus,Semglee) injection QAM INSULIN   • insulin regular (HumuLIN R,NovoLIN R) injection 4X/DAY ACHS   • hydrOXYzine HCl (ATARAX) tablet 50 mg Q6HRS PRN   • melatonin tablet 3 mg HS PRN   • Respiratory Therapy Consult Continuous  RT   • Pharmacy Consult Request ...Pain Management Review 1 Each PHARMACY TO DOSE   • hydrALAZINE (APRESOLINE) tablet 10 mg Q8HRS PRN   • acetaminophen (Tylenol) tablet 650 mg Q4HRS PRN   • lactulose 20 GM/30ML solution 30 mL QDAY PRN   • artificial tears ophthalmic solution 1 Drop PRN   • benzocaine-menthol (Cepacol) lozenge 1 Lozenge Q2HRS PRN   • mag hydrox-al hydrox-simeth (MAALOX PLUS ES or MYLANTA DS) suspension 20 mL Q2HRS PRN   • ondansetron (ZOFRAN ODT) dispertab 4 mg 4X/DAY PRN    Or   • ondansetron (ZOFRAN) syringe/vial injection 4 mg 4X/DAY PRN   • traZODone (DESYREL) tablet 50 mg QHS PRN   • sodium chloride (OCEAN) 0.65 % nasal spray 2 Spray PRN   • amLODIPine (NORVASC) tablet 10 mg Q DAY   • aspirin EC (ECOTRIN) tablet 81 mg DAILY   • atorvastatin (LIPITOR) tablet 80 mg Q EVENING   • carvedilol (COREG) tablet 25 mg BID WITH MEALS   • clopidogrel (PLAVIX) tablet 75 mg DAILY   • enoxaparin (Lovenox) inj 40 mg DAILY AT 1800   • losartan (COZAAR) tablet 100 mg Q DAY   • senna-docusate (PERICOLACE or SENOKOT S) 8.6-50 MG per tablet 2 Tablet BID    And   • polyethylene glycol/lytes (MIRALAX) PACKET 1 Packet QDAY PRN    And   • magnesium hydroxide (MILK OF MAGNESIA) suspension 30 mL QDAY PRN    And   • bisacodyl (DULCOLAX) suppository 10 mg QDAY PRN       Orders Placed This Encounter   Procedures   • Diet Order Diet: Consistent CHO (Diabetic)     Standing Status:   Standing     Number of Occurrences:   1     Order Specific Question:   Diet:     Answer:   Consistent CHO (Diabetic) [4]       Assessment:    This patient is a 56 y.o. male admitted for acute inpatient rehabilitation with Ischemic stroke (HCC).    I led and attended the weekly conference, and agree with the IDT conference documentation and plan of care as noted below.    Date of conference: 6/22/2022    Goals and barriers: See IDT note.    Biggest barriers: Right hand weakness, is right-hand dominant, impaired attention, impaired balance, stairs at  his friend's home in [facility] [place]    CM/social support: plan to discharge to [place] with friend    Anticipated DC date: 6/29    Outpatient: OT/PT    Equip: SPC, shower chair    Follow up: PCP, [facility], Dr. Silverman, cardiology        Problem List/Medical Decision Making and Plan:    Left corona radiata ischemic stroke  Right hemiparesis  Dominant  Continue full rehab program  PT/OT/SLP, 1 hr each discipline, 5 days per week  6/21: SLP to decrease to 30, other 30 for OT as greatest impairment is his right hand    Aspirin and Plavix x 3 weeks, then aspirin alone  Statin    Cardiac monitor placed 6/21 to check for atrial fibrillation (patient with history of), though this stroke is more likely lacunar - not registered per company contact today, neurology notified    Outpatient follow up with neurology, Dr. Silverman, cardiology, referrals made    Diabetes type 2 with hyperglycemia  Uncontrolled, HgbA1C 9.8  Glargine  Sliding scale insulin  Consult hospitalist  Patient needs glucometer at discharge     Diabetic peripheral neuropathy  Monitor need for gabapentin  No pain currently     Hypertension  Losartan  Carvedilol  Amlodipine  Appreciate hospitalist assistance     Paroxysmal atrial fibrillation  Carvedilol  Cardiac monitor placed 6/21  Outpatient follow up with cardiology    Hypokalemia  Supplementation     Coronary artery disease  Status post stents 2014 placed in [place]  Aspirin (on prior to admission)  Plavix (just for now as DAPT for stroke)    Vit D deficiency, 8  Continue high dose supplementation    Bowel program  Continue bowel medications  Last BM 6/21    Bladder program  Check PVRs - 26, 61  Not retaining  Bladder scan for no voids  ICP for over 400 cc  Scheduled toileting     DVT prophylaxis  Lovenox    Total time:  27 minutes.  I spent greater than 50% of the time for patient care, counseling, and coordination on this date, including patient face-to face time, unit/floor time with review of  records/pertinent lab data and studies, as well as discussing diagnostic evaluation/work up, planned therapeutic interventions, and future disposition of care, as per the interval events/subjective and the assessment and plan as noted above.    I have performed a physical exam, reviewed and updated ROS, as well as the assessment and plan today 6/23/2022. In review of note from 6/22/2022 there are no new changes except as documented above.      Aurora Gonzalez M.D.   Physical Medicine and Rehabilitation

## 2022-06-23 NOTE — THERAPY
"Occupational Therapy  Daily Treatment     Patient Name: Reji Madrigal Jr.  Age:  56 y.o., Sex:  male  Medical Record #: [id number]  Today's Date: 6/23/2022     Precautions  Precautions: Fall Risk  Comments: Distal RUE weakness, ZIO patch         Subjective    \"I kept being awoken at night\"   Pt received supine on bed, asleep; agreeable to therapy.      Objective       06/23/22 0701   Precautions   Precautions Fall Risk   Comments Distal RUE weakness, ZIO patch   Functional Level of Assist   Grooming Standby Assist   Grooming Description Verbal cueing;Supervision for safety;Seated in wheelchair at sink;Increased time;Set-up of equipment  (completed oral care; Pt encouraged to facilitate use of RUE)   Toileting Supervision   Toileting Description Grab bar;Increased time;Verbal cueing;Set-up of equipment;Supervision for safety  (vc to doff pants)   Toilet Transfers Contact Guard Assist   Toilet Transfer Description Grab bar;Verbal cueing;Supervision for safety;Set-up of equipment;Increased time  (stand step txfr from wc<>toilet)   Balance   Standing Balance (Static) Fair   Standing Balance (Dynamic) Fair -   Bed Mobility    Supine to Sit Supervised   Scooting Supervised   Interdisciplinary Plan of Care Collaboration   IDT Collaboration with  Nursing   Patient Position at End of Therapy Seated;Chair Alarm On;Self Releasing Lap Belt Applied  (Pt passed off to Nursing at end of session)   Collaboration Comments Nursing re: check blood sugar     Pt set up on Armeo Senso for RUE neuro re-ed, using self-initiated, active, motivating, and repetitive arm movement in a 3D workspace. Sensors placed on pt's chest, R biceps, and R forearm, and hand module strapped to R hand. Pt's AROM in 2D and 3D workspaces assessed. Pt then engaged in 12 minutes of activities using augmented performance feedback to improve RUE ROM, coordination, attention and grasp.     Pt educated on Mirror Therapy exercises to improve coordination and " functional use of RUE. Discussed premise behind MT, including motor and mirror neurons, benefits of MT for improvement of motor skills, neuroplasticity, and benefits of high repetition. Provided pt with handout with information regarding recommended frequency and duration of exercises, set up of equipment, and specific exercises to complete. Pt completed 10 min of exercises, including gross grasp, pronation/supination, wrist extension, finger/thumb adduction/abduction, and finger tapping. Pt instructed to begin with basic movement patterns with slow, controlled and repetitive movement, and to work up to using functional objects during exercises. Provided Pt with mirror box to use in room outside of therapy time and encouraged to use daily to improve functional use of RUE.     Assessment    Pt initially unstable during functional txfr from bed>wc due to inc. Fatigue but improved throughout session. He tolerated Armeo Senso and mirror therapy exercise well, however  con't to be limited by distal RUE weakness and fatigue with repetition. Pt will benefit from using SPC during ADLs and txfrs to improve functional mobility.     Strengths: Able to follow instructions, Alert and oriented, Effective communication skills, Good insight into deficits/needs, Independent prior level of function, Pleasant and cooperative, Willingly participates in therapeutic activities, Motivated for self care and independence  Barriers: Fatigue, Generalized weakness, Hemiparesis, Home accessibility, Hypertension, Impaired balance, Impaired activity tolerance, Poor family support (diabetes, distal RUE weakness)    Plan    ADLs, IADLs, balance, RUE neuro re-ed w/ focus on distal strength and coordination ([place], MT, Armeo Senso), self-feeding assessment     Passport items to be completed:  Perform bathroom transfers, complete dressing, complete feeding, get ready for the day, prepare a simple meal, participate in household tasks, adapt home for  safety needs, demonstrate home exercise program, complete caregiver training     Occupational Therapy Goals (Active)       Problem: Bathing       Dates: Start: 06/21/22         Goal: STG-Within one week, patient will bathe at SPV level.        Dates: Start: 06/21/22         Goal Note filed on 06/22/22 1136 by Michelle Albarran Student       New admit, evaluated on 6/21                 Problem: Dressing       Dates: Start: 06/21/22         Goal: STG-Within one week, patient will dress UB at SPV level using laure technique.       Dates: Start: 06/21/22         Goal Note filed on 06/22/22 1136 by Michelle Albarran Student       New admit, evaluated on 6/21              Goal: STG-Within one week, patient will dress LB at SPV level using laure technique        Dates: Start: 06/21/22         Goal Note filed on 06/22/22 1136 by Michelle Albarran, Student       New admit, evaluated on 6/21                 Problem: Functional Transfers       Dates: Start: 06/21/22         Goal: STG-Within one week, patient will transfer to toilet at SPV level        Dates: Start: 06/21/22         Goal Note filed on 06/22/22 1136 by Michelle Albarran, Student       New admit, evaluated on 6/21                 Problem: OT Long Term Goals       Dates: Start: 06/21/22         Goal: LTG-By discharge, patient will complete basic self care tasks at Mod I - Ind level.       Dates: Start: 06/21/22            Goal: LTG-By discharge, patient will perform bathroom transfers at Mod I - Ind level.       Dates: Start: 06/21/22            Goal: LTG-By discharge, patient will complete basic home management at Mod I - Ind level.       Dates: Start: 06/21/22

## 2022-06-23 NOTE — THERAPY
Physical Therapy   Daily Treatment     Patient Name: Reji Madrigal Jr.  Age:  56 y.o., Sex:  male  Medical Record #: [id number]  Today's Date: 6/23/2022     Precautions  Precautions: Fall Risk  Comments: Distal RUE weakness, ZIO patch    Subjective    Pt was resting in bed upon arrival, agreeable to session.     Objective       06/23/22 0901   PT Charge Group   PT Gait Training 3   PT Therapeutic Exercise 1   PT Therapeutic Activities 2   Supervising Physical Therapist Kurt Brady   PT Total Time Spent   PT Individual Total Time Spent (Mins) 90   Pain 0 - 10 Group   Location Hip   Location Orientation Right   Pain Rating Scale (NPRS) 2   Description Aching   Therapist Pain Assessment During Activity   Cognition    Level of Consciousness Alert   Gait Functional Level of Assist    Gait Level Of Assist Contact Guard Assist   Assistive Device Single Point Cane;Other (Comments)  (w/o AD)   Distance (Feet)   (~1000ft thoughout session)   # of Times Distance was Traveled 2   Deviation Step To;Bradykinetic;Decreased Heel Strike;Decreased Base Of Support   Stairs Functional Level of Assist   Level of Assist with Stairs Contact Guard Assist   # of Stairs Climbed   (4 inches x12, 6 inches x6)   Stairs Description Extra time;Hand rails;Assist device/equipment;Supervision for safety;Verbal cueing  (SPC at LUE w/ RHR)   Transfer Functional Level of Assist   Bed, Chair, Wheelchair Transfer Standby Assist   Bed Chair Wheelchair Transfer Description Adaptive equipment;Increased time;Supervision for safety;Verbal cueing  (SPC)   Toilet Transfers Supervised   Toilet Transfer Description Grab bar;Adaptive equipment;Supervision for safety;Verbal cueing  (FWW <> toilet)   Sitting Lower Body Exercises   Sit to Stand   (4x5 from various height w/ SPC, 1x5 w/o AD w/ SBA)   Bed Mobility    Supine to Sit Supervised   Sit to Supine Supervised   Sit to Stand Standby Assist   Scooting Supervised   Interdisciplinary Plan of Care Collaboration    IDT Collaboration with  Occupational Therapist   Patient Position at End of Therapy In Bed;Call Light within Reach;Tray Table within Reach;Phone within Reach   Collaboration Comments CLOF     Completed car xfer x2 into [place] w/ SPC and w/o AD w/ close SBA.    Ambulation training on treadmill w/o harness support, focusing on feet placement, R foot clearance and WS to R during ambulation.  Total of 10 mins w/ speed control 0.4-0.8 mph.w/ BUEs support. Close SBA throughout session and no sign of LOB thoughout.  Attempted no Ues support w/ 0.3 mph but pt was not able to perform w/o intermittent support.    [place] Maria R in room w/ FWW, not able to carryout safety awareness and currently required Vcs w/ SPV, communicated w/ OT, pt's not clear for Maria R in room at this moment.    Assessment    Pt tolerated session well and demonstrated good BLEs strength throughout session. Improved in activity tolerance and increase in ambulation distance. Was not able to carryout the sequencing for stairs training w/ SPC and RHR, required verbal cues as reminder. Pt is in agreement to use SPC as d/c DME.     Strengths: Able to follow instructions, Alert and oriented, Effective communication skills, Pleasant and cooperative, Willingly participates in therapeutic activities, Independent prior level of function  Barriers: Fatigue, Hemiparesis, Home accessibility, Impaired balance    Plan    litegait for WS and NGOC, ambulation w/ SPC, stairs training w/ SPC, standing balance.     Passport items to be completed:  Get in/out of bed safely, in/out of a vehicle, safely use mobility device, walk or wheel around home/community, navigate up and down stairs, show how to get up/down from the ground, ensure home is accessible, demonstrate HEP, complete caregiver training    Physical Therapy Problems (Active)       Problem: Balance       Dates: Start: 06/22/22         Goal: STG-Within one week, patient will achieve a 50/56 on the [place] Balance Scale  demonstrating low risk for falls and a clinical improvement during IRF stay.       Dates: Start: 06/22/22               Problem: Mobility       Dates: Start: 06/21/22         Goal: STG-Within one week, patient will ambulate community distances 150 feet with SPC and SBA.        Dates: Start: 06/21/22         Goal Note filed on 06/22/22 0808 by Maggie Saul PTA       Evaluated yesterday, currently required CGA              Goal: STG-Within one week, patient will ambulate up/down flight of stairs with single hand rail and SBA.        Dates: Start: 06/21/22         Goal Note filed on 06/22/22 0808 by Maggie Saul PTA       Evaluated yesterday, have not attempted                 Problem: Mobility Transfers       Dates: Start: 06/21/22         Goal: STG-Within one week, patient will move supine to/from sitting independently.        Dates: Start: 06/21/22         Goal Note filed on 06/22/22 0808 by Maggie Saul PTA       Currently SBA              Goal: STG-Within one week, patient will transfer bed to chair IND with SPC.       Dates: Start: 06/22/22            Goal: STG-Within one week, patient will transfer in/out of car IND with a SPC.       Dates: Start: 06/22/22               Problem: PT-Long Term Goals       Dates: Start: 06/21/22         Goal: LTG-By discharge, patient will ambulate 250 feet with spc vs no AD and independent.        Dates: Start: 06/21/22            Goal: LTG-By discharge, patient will transfer one surface to another with SPC or no AD and mod I.        Dates: Start: 06/21/22            Goal: LTG-By discharge, patient will ambulate up/down flight of stairs with single hand rail and mod I.        Dates: Start: 06/21/22            Goal: LTG-By discharge, patient will transfer in/out of a car mod I.        Dates: Start: 06/21/22

## 2022-06-23 NOTE — CARE PLAN
Problem: Knowledge Deficit - Standard  Goal: Patient and family/care givers will demonstrate understanding of plan of care, disease process/condition, diagnostic tests and medications  Outcome: Progressing     Problem: Psychosocial  Goal: Patient's level of anxiety will decrease  Outcome: Progressing     Problem: Bladder / Voiding  Goal: Patient will establish and maintain regular urinary output  Outcome: Progressing

## 2022-06-23 NOTE — THERAPY
"Speech Language Pathology  Daily Treatment     Patient Name: Reji Madrigal Jr.  Age:  56 y.o., Sex:  male  Medical Record #: [id number]  Today's Date: 6/23/2022     Precautions  Precautions: Fall Risk  Comments: Distal RUE weakness, ZIO patch    Subjective    Pt was asleep in bed at the beginning of this session and was willing to transfer out of bed to participate in ST      Objective       06/23/22 1301   Treatment Charges   SLP Cognitive Skill Development First 15 Minutes 1   SLP Cognitive Skill Development Additional 15 Minutes 1   SLP Total Time Spent   SLP Individual Total Time Spent (Mins) 30       Assessment    Pt completed the \"Trip to [place]\" task from yesterday's ST session.  While reviewing this task only 1 error was noted (Pt calculated for 4 days of hotel rooms vs 2 days).  Pt was able to identify and correct this error with min cues.  Pt reported that this task was very similar to a task he would complete at work (calculating budgets, itemizing, etc).  Pt initiated similar task (Planning a holiday trip) during this session, but was unable to complete it due to time constraints.  Pt brought this task back to his room to complete it to review during ST tomorrow.      Strengths: Able to follow instructions, Making steady progress towards goals, Pleasant and cooperative, Willingly participates in therapeutic activities, Motivated for self care and independence  Barriers: Impaired functional cognition    Plan    Review \"planning a holiday tip task\", target executive functions, stroke education         Speech Therapy Problems (Active)       Problem: Memory STGs       Dates: Start: 06/21/22         Goal: STG-Within one week, patient will learn and implement functional memory strategies to assist in recall of information related to stroke, hospitalization and daily planning with 80% provided SPV       Dates: Start: 06/21/22         Goal Note filed on 06/22/22 1308 by Luz Maria Torres MS,CCC-SLP       " Recent eval                 Problem: Problem Solving STGs       Dates: Start: 06/21/22         Goal: STG-Within one week, patient will complete complex executive function tasks with 90% accuracy provided SPV       Dates: Start: 06/21/22         Goal Note filed on 06/22/22 1308 by Luz Maria Torres MS,CCC-SLP       Recent eval                 Problem: Speech/Swallowing LTGs       Dates: Start: 06/21/22         Goal: LTG-By discharge, patient will complete complex problem solving and recall information with 80% accuracy and MOD I       Dates: Start: 06/21/22

## 2022-06-24 LAB
GLUCOSE BLD STRIP.AUTO-MCNC: 128 MG/DL (ref 65–99)
GLUCOSE BLD STRIP.AUTO-MCNC: 137 MG/DL (ref 65–99)
GLUCOSE BLD STRIP.AUTO-MCNC: 145 MG/DL (ref 65–99)

## 2022-06-24 PROCEDURE — 97129 THER IVNTJ 1ST 15 MIN: CPT

## 2022-06-24 PROCEDURE — 97535 SELF CARE MNGMENT TRAINING: CPT

## 2022-06-24 PROCEDURE — 82962 GLUCOSE BLOOD TEST: CPT

## 2022-06-24 PROCEDURE — A9270 NON-COVERED ITEM OR SERVICE: HCPCS | Performed by: PHYSICAL MEDICINE & REHABILITATION

## 2022-06-24 PROCEDURE — 99232 SBSQ HOSP IP/OBS MODERATE 35: CPT | Performed by: HOSPITALIST

## 2022-06-24 PROCEDURE — 97130 THER IVNTJ EA ADDL 15 MIN: CPT

## 2022-06-24 PROCEDURE — 770010 HCHG ROOM/CARE - REHAB SEMI PRIVAT*

## 2022-06-24 PROCEDURE — 97112 NEUROMUSCULAR REEDUCATION: CPT

## 2022-06-24 PROCEDURE — 700111 HCHG RX REV CODE 636 W/ 250 OVERRIDE (IP): Performed by: PHYSICAL MEDICINE & REHABILITATION

## 2022-06-24 PROCEDURE — 97116 GAIT TRAINING THERAPY: CPT

## 2022-06-24 PROCEDURE — 700102 HCHG RX REV CODE 250 W/ 637 OVERRIDE(OP): Performed by: PHYSICAL MEDICINE & REHABILITATION

## 2022-06-24 PROCEDURE — 99231 SBSQ HOSP IP/OBS SF/LOW 25: CPT | Performed by: PHYSICAL MEDICINE & REHABILITATION

## 2022-06-24 RX ADMIN — CLOPIDOGREL BISULFATE 75 MG: 75 TABLET ORAL at 09:30

## 2022-06-24 RX ADMIN — LOSARTAN POTASSIUM 100 MG: 25 TABLET, FILM COATED ORAL at 05:49

## 2022-06-24 RX ADMIN — ATORVASTATIN CALCIUM 80 MG: 40 TABLET, FILM COATED ORAL at 21:36

## 2022-06-24 RX ADMIN — SENNOSIDES AND DOCUSATE SODIUM 2 TABLET: 50; 8.6 TABLET ORAL at 21:35

## 2022-06-24 RX ADMIN — SENNOSIDES AND DOCUSATE SODIUM 2 TABLET: 50; 8.6 TABLET ORAL at 09:30

## 2022-06-24 RX ADMIN — AMLODIPINE BESYLATE 10 MG: 5 TABLET ORAL at 05:50

## 2022-06-24 RX ADMIN — ENOXAPARIN SODIUM 40 MG: 40 INJECTION SUBCUTANEOUS at 17:51

## 2022-06-24 RX ADMIN — CARVEDILOL 25 MG: 12.5 TABLET, FILM COATED ORAL at 09:29

## 2022-06-24 RX ADMIN — CARVEDILOL 25 MG: 12.5 TABLET, FILM COATED ORAL at 17:51

## 2022-06-24 RX ADMIN — ASPIRIN 81 MG: 81 TABLET, COATED ORAL at 09:30

## 2022-06-24 ASSESSMENT — ENCOUNTER SYMPTOMS
SHORTNESS OF BREATH: 0
MUSCULOSKELETAL NEGATIVE: 1
VOMITING: 0
NAUSEA: 0
COUGH: 0
BRUISES/BLEEDS EASILY: 0
ABDOMINAL PAIN: 0
PALPITATIONS: 0
CHILLS: 0
EYES NEGATIVE: 1
POLYDIPSIA: 0
FOCAL WEAKNESS: 1
FEVER: 0

## 2022-06-24 ASSESSMENT — GAIT ASSESSMENTS
DISTANCE (FEET): 150
GAIT LEVEL OF ASSIST: CONTACT GUARD ASSIST
GAIT LEVEL OF ASSIST: CONTACT GUARD ASSIST
DISTANCE (FEET): 300

## 2022-06-24 NOTE — THERAPY
Speech Language Pathology  Daily Treatment     Patient Name: Reji Madrigal Jr.  Age:  56 y.o., Sex:  male  Medical Record #: [id number]  Today's Date: 6/24/2022     Precautions  Precautions: Fall Risk  Comments: Zio patch    Subjective    Pt pleasant and cooperative.     Objective       06/24/22 0833   Treatment Charges   SLP Cognitive Skill Development First 15 Minutes 1   SLP Cognitive Skill Development Additional 15 Minutes 1   SLP Total Time Spent   SLP Individual Total Time Spent (Mins) 30       Assessment    Pt presented with checkbook organization tasks with calculations, pt unable to complete due to time restraints however pt requested that he take it back to his room to finish. Pt expressing concern yet excitement for dc next week, asking appropriate questions related to stroke. Initiate stroke education in future st session.    Strengths: Able to follow instructions, Making steady progress towards goals, Pleasant and cooperative, Willingly participates in therapeutic activities, Motivated for self care and independence  Barriers: Impaired functional cognition    Plan    Review checkbook task, initiate stroke education.     Speech Therapy Problems (Active)       Problem: Memory STGs       Dates: Start: 06/21/22         Goal: STG-Within one week, patient will learn and implement functional memory strategies to assist in recall of information related to stroke, hospitalization and daily planning with 80% provided SPV       Dates: Start: 06/21/22         Goal Note filed on 06/22/22 1308 by Luz Maria Torres MS,CCC-SLP       Recent eval                 Problem: Problem Solving STGs       Dates: Start: 06/21/22         Goal: STG-Within one week, patient will complete complex executive function tasks with 90% accuracy provided SPV       Dates: Start: 06/21/22         Goal Note filed on 06/22/22 1308 by Luz Maria Torres MS,CCC-SLP       Recent eval                 Problem: Speech/Swallowing LTGs        Dates: Start: 06/21/22         Goal: LTG-By discharge, patient will complete complex problem solving and recall information with 80% accuracy and MOD I       Dates: Start: 06/21/22

## 2022-06-24 NOTE — CARE PLAN
Problem: Diabetes Management  Goal: Patient's ability to maintain appropriate glucose levels will be maintained or improve  Outcome: Progressing  Note: Finger stick monitored -  HS- result- 152, due coverage given. Snacks provided, no s/s of hypo and hyperglycemia noted,     Problem: Fall Risk - Rehab  Goal: Patient will remain free from falls  Outcome: Progressing  Note: Leia Queen Fall risk Assessment Score: 9    Low fall risk interventions   - Call light within reach   - Yellow  socks   - Belongings within reach   - Bed in the lowest position        Problem: Pain - Standard  Goal: Alleviation of pain or a reduction in pain to the patient’s comfort goal  Outcome: Progressing  Note: Assessed for pain and discomfort, denies pain , pain under control . Needs anticipated and attended.   The patient is Stable - Low risk of patient condition declining or worsening    Shift Goals  Clinical Goals: Safety  Patient Goals: Sleep well    Progress made toward(s) clinical / shift goals:  Pt free from fall and injury.

## 2022-06-24 NOTE — THERAPY
Occupational Therapy  Daily Treatment     Patient Name: Reji Madrigal Jr.  Age:  56 y.o., Sex:  male  Medical Record #: [id number]  Today's Date: 6/24/2022     Precautions  Precautions: Fall Risk  Comments: Distal RUE weakness, ZIO patch         Subjective    Pt seen for OT during breakfast to assess self-feeding.     Objective       06/24/22 0801   OT Charge Group   OT Self Care / ADL 2   OT Total Time Spent   OT Individual Total Time Spent (Mins) 30   Functional Level of Assist   Eating Supervision   Eating Description Adaptive equipment;Increased time;Set-up of equipment or meal/tube feeding;Supervision for safety  (use of built up utensils, t- rocker knife, plate guard)   Interdisciplinary Plan of Care Collaboration   Patient Position at End of Therapy Edge of Bed;Bed Alarm On;Call Light within Reach;Tray Table within Reach;Phone within Reach     Pt seen for OT during breakfast to assess self-feeding and trial AE to increase use of RUE for self-feeding. Pt able to use utensils with built up  to scoop fruit, rocker knife to cut up banana and strawberries, and use of plate guard to assist with scooping. Pt able to open water bottle while holding/stabilizing with R hand and unscrewing cap with L hand.   Educated pt on benefits of increasing use of RUE for self-feeding, even if using as a stabilizer/gross assist to improve functional use.     Assessment    Pt able to use AE for self feeding with increased time, and will benefit from ongoing use/practice. Pt does fatigue with repetition, but demonstrates good carryover with use of AE, including utensils with built up , t- rocker knife and plate guard.   Strengths: Able to follow instructions, Alert and oriented, Effective communication skills, Good insight into deficits/needs, Independent prior level of function, Pleasant and cooperative, Willingly participates in therapeutic activities, Motivated for self care and independence  Barriers: Fatigue,  Generalized weakness, Hemiparesis, Home accessibility, Hypertension, Impaired balance, Impaired activity tolerance, Poor family support (diabetes, distal RUE weakness)    Plan    ADLs, IADLs, balance, RUE neuro re-ed w/ focus on distal strength and coordination ([place], [place], Armeo Senso), monitor self-feeding, typing/computer/mouse work using RUE.     Occupational Therapy Goals (Active)       Problem: Bathing       Dates: Start: 06/21/22         Goal: STG-Within one week, patient will bathe at SPV level.        Dates: Start: 06/21/22         Goal Note filed on 06/22/22 1136 by Michelle Albarran, Student       New admit, evaluated on 6/21                 Problem: Dressing       Dates: Start: 06/21/22         Goal: STG-Within one week, patient will dress UB at SPV level using laure technique.       Dates: Start: 06/21/22         Goal Note filed on 06/22/22 1136 by Michelle Albarran Student       New admit, evaluated on 6/21              Goal: STG-Within one week, patient will dress LB at SPV level using laure technique        Dates: Start: 06/21/22         Goal Note filed on 06/22/22 1136 by Michelle Albarran, Student       New admit, evaluated on 6/21                 Problem: Functional Transfers       Dates: Start: 06/21/22         Goal: STG-Within one week, patient will transfer to toilet at SPV level        Dates: Start: 06/21/22         Goal Note filed on 06/22/22 1136 by Michelle Albarran, Student       New admit, evaluated on 6/21                 Problem: OT Long Term Goals       Dates: Start: 06/21/22         Goal: LTG-By discharge, patient will complete basic self care tasks at Mod I - Ind level.       Dates: Start: 06/21/22            Goal: LTG-By discharge, patient will perform bathroom transfers at Mod I - Ind level.       Dates: Start: 06/21/22            Goal: LTG-By discharge, patient will complete basic home management at Mod I - Ind level.       Dates: Start:  06/21/22

## 2022-06-24 NOTE — THERAPY
Occupational Therapy  Daily Treatment     Patient Name: Reji Madrigal Jr.  Age:  56 y.o., Sex:  male  Medical Record #: [id number]  Today's Date: 6/24/2022     Precautions  Precautions: (P) Fall Risk  Comments: (P) Distal RUE weakness, ZIO patch         Subjective    Pt in bed upon arrival, pleasant and agreeable to OT session.       Objective       06/24/22 1401   OT Charge Group   OT Self Care / ADL 1   OT Neuromuscular Re-education / Balance 1   OT Total Time Spent   OT Individual Total Time Spent (Mins) 30   Precautions   Precautions Fall Risk   Comments Distal RUE weakness, ZIO patch   Functional Level of Assist   Grooming Standby Assist;Seated   Grooming Description   (completed oral care; Pt encouraged to use of RUE as assist and to stabilize during activity)   Toileting Supervision   Bed, Chair, Wheelchair Transfer Standby Assist   Toilet Transfers Supervised   Interdisciplinary Plan of Care Collaboration   IDT Collaboration with  Physical Therapist   Patient Position at End of Therapy Seated;Chair Alarm On;Other (Comments)   Collaboration Comments handoff of care to PT in main gym     Blocked practice with RUE reach/grasp/release of cones (x12) with emphasis to reach to make a C shape with hand around cone and also beanbags (x25) from tabletop placing them onto the matching colored [place], with verbal cues to exaggerate movement of wrist/finger extension.     Completed squeeze and release with full fist and then to extention. Pt able to make full fist and then open into extention 75% open with therapist A for full extension.    Edu on prayer PROM stretch for RUE to continue to maintain full extension of fingers while in room.    Assessment    Pt tolerated session well with a focus on neuro re-edu of RUE and ADLs. Pt able to incorporate RUE into teeth brushing with Min encouragement and verbal cueing. Pt able to complete all tasks given this session to focus on grasp and release patterns. PT had increased  difficultly with grasp and release of cones when hand in C-shape.    Strengths: Able to follow instructions, Alert and oriented, Effective communication skills, Good insight into deficits/needs, Independent prior level of function, Pleasant and cooperative, Willingly participates in therapeutic activities, Motivated for self care and independence  Barriers: Fatigue, Generalized weakness, Hemiparesis, Home accessibility, Hypertension, Impaired balance, Impaired activity tolerance, Poor family support (diabetes, distal RUE weakness)    Plan    ADLs, IADLs, balance, RUE neuro re-ed w/ focus on distal strength and coordination ([place], [place], Armeo Senso), self-feeding assessment         Occupational Therapy Goals (Active)       Problem: Bathing       Dates: Start: 06/21/22         Goal: STG-Within one week, patient will bathe at SPV level.        Dates: Start: 06/21/22         Goal Note filed on 06/22/22 1136 by Michelel Albarran, Student       New admit, evaluated on 6/21                 Problem: Dressing       Dates: Start: 06/21/22         Goal: STG-Within one week, patient will dress UB at SPV level using laure technique.       Dates: Start: 06/21/22         Goal Note filed on 06/22/22 1136 by Michelle Albarran, Student       New admit, evaluated on 6/21              Goal: STG-Within one week, patient will dress LB at SPV level using laure technique        Dates: Start: 06/21/22         Goal Note filed on 06/22/22 1136 by Michelle Albarran, Student       New admit, evaluated on 6/21                 Problem: Functional Transfers       Dates: Start: 06/21/22         Goal: STG-Within one week, patient will transfer to toilet at SPV level        Dates: Start: 06/21/22         Goal Note filed on 06/22/22 1136 by Michelle Albarran, Student       New admit, evaluated on 6/21                 Problem: OT Long Term Goals       Dates: Start: 06/21/22         Goal: LTG-By discharge,  patient will complete basic self care tasks at Mod I - Ind level.       Dates: Start: 06/21/22            Goal: LTG-By discharge, patient will perform bathroom transfers at Mod I - Ind level.       Dates: Start: 06/21/22            Goal: LTG-By discharge, patient will complete basic home management at Mod I - Ind level.       Dates: Start: 06/21/22

## 2022-06-24 NOTE — PROGRESS NOTES
"Rehab Progress Note     Date of Service: 6/24/2022  Chief Complaint: follow up stroke    Interval Events (Subjective)    Patient seen and examined today in the therapy gym.   He is on the treadmill with PT.  He reports he has a history of his right foot externally rotating, even prior to his stroke.  He reports some mild improvement with his ability to extend his right fingers.  He has no new concerns or complaints today.    ROS: No changes to bowel, bladder, pain, mood, or sleep.             Objective:  VITAL SIGNS: /80   Pulse (!) 59   Temp 36.8 °C (98.2 °F) (Oral)   Resp 16   Ht 1.803 m (5' 11\")   Wt 88.5 kg (195 lb)   SpO2 97%   BMI 27.20 kg/m²   Gen: alert, no apparent distress  Neuro: notable for right hand weakness, ataxic gait    Recent Results (from the past 72 hour(s))   POCT glucose device results    Collection Time: 06/21/22  5:21 PM   Result Value Ref Range    POC Glucose, Blood 166 (H) 65 - 99 mg/dL   POCT glucose device results    Collection Time: 06/21/22  8:43 PM   Result Value Ref Range    POC Glucose, Blood 168 (H) 65 - 99 mg/dL   PHOSPHORUS    Collection Time: 06/22/22  5:24 AM   Result Value Ref Range    Phosphorus 4.1 2.5 - 4.5 mg/dL   POTASSIUM SERUM (K)    Collection Time: 06/22/22  5:24 AM   Result Value Ref Range    Potassium 3.3 (L) 3.6 - 5.5 mmol/L   HEMOGLOBIN A1C    Collection Time: 06/22/22  5:24 AM   Result Value Ref Range    Glycohemoglobin 9.8 (H) 4.0 - 5.6 %    Est Avg Glucose 235 mg/dL   POCT glucose device results    Collection Time: 06/22/22  7:59 AM   Result Value Ref Range    POC Glucose, Blood 175 (H) 65 - 99 mg/dL   POCT glucose device results    Collection Time: 06/22/22 11:25 AM   Result Value Ref Range    POC Glucose, Blood 143 (H) 65 - 99 mg/dL   POCT glucose device results    Collection Time: 06/22/22  5:08 PM   Result Value Ref Range    POC Glucose, Blood 125 (H) 65 - 99 mg/dL   POCT glucose device results    Collection Time: 06/22/22  8:48 PM   Result " Value Ref Range    POC Glucose, Blood 156 (H) 65 - 99 mg/dL   POCT glucose device results    Collection Time: 06/23/22  8:03 AM   Result Value Ref Range    POC Glucose, Blood 166 (H) 65 - 99 mg/dL   POCT glucose device results    Collection Time: 06/23/22 11:10 AM   Result Value Ref Range    POC Glucose, Blood 134 (H) 65 - 99 mg/dL   POCT glucose device results    Collection Time: 06/23/22  4:59 PM   Result Value Ref Range    POC Glucose, Blood 198 (H) 65 - 99 mg/dL   POCT glucose device results    Collection Time: 06/23/22  8:12 PM   Result Value Ref Range    POC Glucose, Blood 152 (H) 65 - 99 mg/dL   POCT glucose device results    Collection Time: 06/24/22  7:44 AM   Result Value Ref Range    POC Glucose, Blood 145 (H) 65 - 99 mg/dL   POCT glucose device results    Collection Time: 06/24/22 11:13 AM   Result Value Ref Range    POC Glucose, Blood 137 (H) 65 - 99 mg/dL       Current Facility-Administered Medications   Medication Frequency   • vitamin D2 (Ergocalciferol) (Drisdol) capsule 50,000 Units Q7 DAYS   • insulin GLARGINE (Lantus,Semglee) injection QAM INSULIN   • insulin regular (HumuLIN R,NovoLIN R) injection 4X/DAY ACHS   • hydrOXYzine HCl (ATARAX) tablet 50 mg Q6HRS PRN   • melatonin tablet 3 mg HS PRN   • Respiratory Therapy Consult Continuous RT   • Pharmacy Consult Request ...Pain Management Review 1 Each PHARMACY TO DOSE   • hydrALAZINE (APRESOLINE) tablet 10 mg Q8HRS PRN   • acetaminophen (Tylenol) tablet 650 mg Q4HRS PRN   • lactulose 20 GM/30ML solution 30 mL QDAY PRN   • artificial tears ophthalmic solution 1 Drop PRN   • benzocaine-menthol (Cepacol) lozenge 1 Lozenge Q2HRS PRN   • mag hydrox-al hydrox-simeth (MAALOX PLUS ES or MYLANTA DS) suspension 20 mL Q2HRS PRN   • ondansetron (ZOFRAN ODT) dispertab 4 mg 4X/DAY PRN    Or   • ondansetron (ZOFRAN) syringe/vial injection 4 mg 4X/DAY PRN   • traZODone (DESYREL) tablet 50 mg QHS PRN   • sodium chloride (OCEAN) 0.65 % nasal spray 2 Spray PRN   •  amLODIPine (NORVASC) tablet 10 mg Q DAY   • aspirin EC (ECOTRIN) tablet 81 mg DAILY   • atorvastatin (LIPITOR) tablet 80 mg Q EVENING   • carvedilol (COREG) tablet 25 mg BID WITH MEALS   • clopidogrel (PLAVIX) tablet 75 mg DAILY   • enoxaparin (Lovenox) inj 40 mg DAILY AT 1800   • losartan (COZAAR) tablet 100 mg Q DAY   • senna-docusate (PERICOLACE or SENOKOT S) 8.6-50 MG per tablet 2 Tablet BID    And   • polyethylene glycol/lytes (MIRALAX) PACKET 1 Packet QDAY PRN    And   • magnesium hydroxide (MILK OF MAGNESIA) suspension 30 mL QDAY PRN    And   • bisacodyl (DULCOLAX) suppository 10 mg QDAY PRN       Orders Placed This Encounter   Procedures   • Diet Order Diet: Consistent CHO (Diabetic) (please put utensils with built up , t- rocker knife and plate guard on tray for all meals.)     Standing Status:   Standing     Number of Occurrences:   1     Order Specific Question:   Diet:     Answer:   Consistent CHO (Diabetic) [4]     Comments:   please put utensils with built up , t- rocker knife and plate guard on tray for all meals.       Assessment:    This patient is a 56 y.o. male admitted for acute inpatient rehabilitation with Ischemic stroke (HCC).    I led and attended the weekly conference, and agree with the IDT conference documentation and plan of care as noted below.    Date of conference: 6/22/2022    Goals and barriers: See IDT note.    Biggest barriers: Right hand weakness, is right-hand dominant, impaired attention, impaired balance, stairs at his friend's home in [facility]    CM/social support: plan to discharge to [place] with friend    Anticipated DC date: 6/29    Outpatient: OT/PT    Equip: SPC, shower chair    Follow up: PCP, [facility], Dr. Silverman, cardiology        Problem List/Medical Decision Making and Plan:    Left corona radiata ischemic stroke  Right hemiparesis, mostly right arm/hand  Dominant  Continue full rehab program  PT/OT/SLP, 1 hr each discipline, 5 days  per week  6/21: SLP to decrease to 30, other 30 for OT as greatest impairment is his right hand    Aspirin and Plavix x 3 weeks, then aspirin alone starting 7/12  Statin    Cardiac monitor placed 6/21 to check for atrial fibrillation (patient with history of), though this stroke is more likely lacunar, not activated until 6/23    Outpatient follow up with neurology, Dr. Silverman, cardiology, referrals made    Diabetes type 2 with hyperglycemia  Uncontrolled, HgbA1C 9.8  Glargine  Sliding scale insulin  Consult hospitalist  Patient needs glucometer at discharge     Diabetic peripheral neuropathy  Monitor need for gabapentin  No pain currently     Hypertension  Losartan  Carvedilol  Amlodipine  Appreciate hospitalist assistance     Paroxysmal atrial fibrillation  Carvedilol  Cardiac monitor placed 6/21, activated 6/23  Outpatient follow up with cardiology    Hypokalemia  Supplementation     Coronary artery disease  Status post stents 2014 placed in [place]  Aspirin (on prior to admission)  Plavix (just for now as DAPT for stroke)    Vit D deficiency, 8  Continue high dose supplementation    Bowel program  Continue bowel medications  Last BM 6/23    Bladder program  Check PVRs - 26, 61  Not retaining  Bladder scan for no voids  ICP for over 400 cc  Scheduled toileting     DVT prophylaxis  Lovenox    Total time:  18 minutes.  I spent greater than 50% of the time for patient care, counseling, and coordination on this date, including patient face-to face time, unit/floor time with review of records/pertinent lab data and studies, as well as discussing diagnostic evaluation/work up, planned therapeutic interventions, and future disposition of care, as per the interval events/subjective and the assessment and plan as noted above.    I have performed a physical exam, reviewed and updated ROS, as well as the assessment and plan today 6/24/2022. In review of note from 6/23/2022 there are no new changes except as documented  above.        Aurora Gonzalez M.D.   Physical Medicine and Rehabilitation

## 2022-06-24 NOTE — THERAPY
Physical Therapy   Daily Treatment     Patient Name: Reji Madrigal Jr.  Age:  56 y.o., Sex:  male  Medical Record #: [id number]  Today's Date: 6/24/2022     Precautions  Precautions: (P) Fall Risk  Comments: (P) Zio patch    [id number] Session:  [id number] Session:    Subjective    Pt received in bed working on computer, motivated to begin PT session.     Objective       06/24/22 1001   PT Charge Group   PT Gait Training 3   PT Neuromuscular Re-Education / Balance 1   PT Total Time Spent   PT Individual Total Time Spent (Mins) 60   Precautions   Precautions Fall Risk   Comments Zio patch   Cognition    Level of Consciousness Alert   Sleep/Wake Cycle   Sleep & Rest Awake   Gait Functional Level of Assist    Gait Level Of Assist Contact Guard Assist   Assistive Device None  (x200 with FWW and SBA)   Distance (Feet) 300   Transfer Functional Level of Assist   Bed, Chair, Wheelchair Transfer Standby Assist   Bed Mobility    Supine to Sit Supervised   Sit to Stand Standby Assist   Neuro-Muscular Treatments   Neuro-Muscular Treatments Facilitation;Sensory Stimuli   Interdisciplinary Plan of Care Collaboration   IDT Collaboration with  Physician   Patient Position at End of Therapy Seated;Chair Alarm On;Self Releasing Lap Belt Applied;Call Light within Reach;Tray Table within Reach;Phone within Reach   Collaboration Comments Dr. Gonzalez observed pt ambulating       Ambulation on treadmill with harness, no lift provided:  -Total of 18' , speed 0.8-0.9 mph, rest break at 5:49', ~10' and ~16'.   -External cuesto facilitate increased R step length (external target)  -step over obstacles placed on treadmill  -Resisted R LE advancement, green TB 2x2'    Outside gait: no AD, CGA., forward gait and sidestepping x50' each direction.     Neuromuscular re-ed: extensive edu on neuroplasticity and how to implement during functional activities.       Assessment  Significantly improved R step length following interventions on treadmill.  Fatigue was a barrier to increased ambulation towards end of therapy session.     Strengths: Able to follow instructions, Alert and oriented, Effective communication skills, Pleasant and cooperative, Willingly participates in therapeutic activities, Independent prior level of function  Barriers: Fatigue, Hemiparesis, Home accessibility, Impaired balance    Plan    Litegait, dynamic gait activities, overground gait training; neuromuscular re-ed        [id number] session:       06/24/22 1431   PT Charge Group   PT Neuromuscular Re-Education / Balance 2   PT Total Time Spent   PT Individual Total Time Spent (Mins) 30   Precautions   Precautions Fall Risk   Cognition    Level of Consciousness Alert   Gait Functional Level of Assist    Gait Level Of Assist Contact Guard Assist   Assistive Device None   Distance (Feet) 150   Sitting Lower Body Exercises   Sit to Stand 1 set of 10  (Hand support, SBA)   Bed Mobility    Sit to Stand Standby Assist   Interdisciplinary Plan of Care Collaboration   Patient Position at End of Therapy Seated;Chair Alarm On;Self Releasing Lap Belt Applied;Call Light within Reach;Tray Table within Reach;Phone within Reach      Neuro re-ed:              -quadruped->tall kneeling x10 reps              -quadruped twist x10 reps B              -quadruped alt hip ext x10 reps B              -quadruped alt step x10 reps B              -tall kneeling trunk rotations x10 reps B     Dynamic gait: sidestepping x60' B  Tandem stepping x~20 steps, difficulty maintaining balance- takes appropriate step to correct balance     Floor<>mat tx with SPV: sitting EOM<>half kneeling<>quadruped.     Safety edu: line up to seat prior to sitting.        Assessment     Pt fatigued during both mat and gait activities this session. Impaired trunk control without LOB noted during quadruped activities.       Physical Therapy Problems (Active)       Problem: Balance       Dates: Start: 06/22/22         Goal: STG-Within one  week, patient will achieve a 50/56 on the Pulido Balance Scale demonstrating low risk for falls and a clinical improvement during IRF stay.       Dates: Start: 06/22/22               Problem: Mobility       Dates: Start: 06/21/22         Goal: STG-Within one week, patient will ambulate community distances 150 feet with SPC and SBA.        Dates: Start: 06/21/22         Goal Note filed on 06/22/22 0808 by Maggie Saul PTA       Evaluated yesterday, currently required CGA              Goal: STG-Within one week, patient will ambulate up/down flight of stairs with single hand rail and SBA.        Dates: Start: 06/21/22         Goal Note filed on 06/22/22 0808 by Maggie Saul PTA       Evaluated yesterday, have not attempted                 Problem: Mobility Transfers       Dates: Start: 06/21/22         Goal: STG-Within one week, patient will move supine to/from sitting independently.        Dates: Start: 06/21/22         Goal Note filed on 06/22/22 0808 by Maggie Saul PTA       Currently SBA              Goal: STG-Within one week, patient will transfer bed to chair IND with SPC.       Dates: Start: 06/22/22            Goal: STG-Within one week, patient will transfer in/out of car IND with a SPC.       Dates: Start: 06/22/22               Problem: PT-Long Term Goals       Dates: Start: 06/21/22         Goal: LTG-By discharge, patient will ambulate 250 feet with spc vs no AD and independent.        Dates: Start: 06/21/22            Goal: LTG-By discharge, patient will transfer one surface to another with SPC or no AD and mod I.        Dates: Start: 06/21/22            Goal: LTG-By discharge, patient will ambulate up/down flight of stairs with single hand rail and mod I.        Dates: Start: 06/21/22            Goal: LTG-By discharge, patient will transfer in/out of a car mod I.        Dates: Start: 06/21/22

## 2022-06-25 LAB
ANION GAP SERPL CALC-SCNC: 11 MMOL/L (ref 7–16)
BUN SERPL-MCNC: 20 MG/DL (ref 8–22)
CALCIUM SERPL-MCNC: 9.4 MG/DL (ref 8.5–10.5)
CHLORIDE SERPL-SCNC: 109 MMOL/L (ref 96–112)
CO2 SERPL-SCNC: 22 MMOL/L (ref 20–33)
CREAT SERPL-MCNC: 1.04 MG/DL (ref 0.5–1.4)
ERYTHROCYTE [DISTWIDTH] IN BLOOD BY AUTOMATED COUNT: 39.8 FL (ref 35.9–50)
GFR SERPLBLD CREATININE-BSD FMLA CKD-EPI: 83 ML/MIN/1.73 M 2
GLUCOSE BLD STRIP.AUTO-MCNC: 132 MG/DL (ref 65–99)
GLUCOSE BLD STRIP.AUTO-MCNC: 148 MG/DL (ref 65–99)
GLUCOSE BLD STRIP.AUTO-MCNC: 151 MG/DL (ref 65–99)
GLUCOSE BLD STRIP.AUTO-MCNC: 163 MG/DL (ref 65–99)
GLUCOSE SERPL-MCNC: 131 MG/DL (ref 65–99)
HCT VFR BLD AUTO: 40.9 % (ref 42–52)
HGB BLD-MCNC: 13.7 G/DL (ref 14–18)
MCH RBC QN AUTO: 29.5 PG (ref 27–33)
MCHC RBC AUTO-ENTMCNC: 33.5 G/DL (ref 33.7–35.3)
MCV RBC AUTO: 88 FL (ref 81.4–97.8)
PLATELET # BLD AUTO: 262 K/UL (ref 164–446)
PMV BLD AUTO: 11.3 FL (ref 9–12.9)
POTASSIUM SERPL-SCNC: 3.6 MMOL/L (ref 3.6–5.5)
RBC # BLD AUTO: 4.65 M/UL (ref 4.7–6.1)
SODIUM SERPL-SCNC: 142 MMOL/L (ref 135–145)
WBC # BLD AUTO: 5.9 K/UL (ref 4.8–10.8)

## 2022-06-25 PROCEDURE — 80048 BASIC METABOLIC PNL TOTAL CA: CPT

## 2022-06-25 PROCEDURE — 700111 HCHG RX REV CODE 636 W/ 250 OVERRIDE (IP): Performed by: PHYSICAL MEDICINE & REHABILITATION

## 2022-06-25 PROCEDURE — 97112 NEUROMUSCULAR REEDUCATION: CPT | Mod: CQ

## 2022-06-25 PROCEDURE — A9270 NON-COVERED ITEM OR SERVICE: HCPCS | Performed by: HOSPITALIST

## 2022-06-25 PROCEDURE — 97129 THER IVNTJ 1ST 15 MIN: CPT

## 2022-06-25 PROCEDURE — 97116 GAIT TRAINING THERAPY: CPT | Mod: CQ

## 2022-06-25 PROCEDURE — 700102 HCHG RX REV CODE 250 W/ 637 OVERRIDE(OP): Performed by: HOSPITALIST

## 2022-06-25 PROCEDURE — A9270 NON-COVERED ITEM OR SERVICE: HCPCS | Performed by: PHYSICAL MEDICINE & REHABILITATION

## 2022-06-25 PROCEDURE — 97130 THER IVNTJ EA ADDL 15 MIN: CPT

## 2022-06-25 PROCEDURE — 36415 COLL VENOUS BLD VENIPUNCTURE: CPT

## 2022-06-25 PROCEDURE — 700102 HCHG RX REV CODE 250 W/ 637 OVERRIDE(OP): Performed by: PHYSICAL MEDICINE & REHABILITATION

## 2022-06-25 PROCEDURE — 97530 THERAPEUTIC ACTIVITIES: CPT

## 2022-06-25 PROCEDURE — 85027 COMPLETE CBC AUTOMATED: CPT

## 2022-06-25 PROCEDURE — 82962 GLUCOSE BLOOD TEST: CPT | Mod: 91

## 2022-06-25 PROCEDURE — 97112 NEUROMUSCULAR REEDUCATION: CPT

## 2022-06-25 PROCEDURE — 99232 SBSQ HOSP IP/OBS MODERATE 35: CPT | Performed by: HOSPITALIST

## 2022-06-25 PROCEDURE — 770010 HCHG ROOM/CARE - REHAB SEMI PRIVAT*

## 2022-06-25 RX ORDER — HYDRALAZINE HYDROCHLORIDE 25 MG/1
25 TABLET, FILM COATED ORAL 2 TIMES DAILY
Status: DISCONTINUED | OUTPATIENT
Start: 2022-06-25 | End: 2022-06-27

## 2022-06-25 RX ADMIN — ASPIRIN 81 MG: 81 TABLET, COATED ORAL at 07:36

## 2022-06-25 RX ADMIN — ATORVASTATIN CALCIUM 80 MG: 40 TABLET, FILM COATED ORAL at 21:24

## 2022-06-25 RX ADMIN — SENNOSIDES AND DOCUSATE SODIUM 2 TABLET: 50; 8.6 TABLET ORAL at 21:24

## 2022-06-25 RX ADMIN — HYDRALAZINE HYDROCHLORIDE 25 MG: 25 TABLET, FILM COATED ORAL at 11:40

## 2022-06-25 RX ADMIN — ENOXAPARIN SODIUM 40 MG: 40 INJECTION SUBCUTANEOUS at 17:34

## 2022-06-25 RX ADMIN — CARVEDILOL 25 MG: 12.5 TABLET, FILM COATED ORAL at 07:36

## 2022-06-25 RX ADMIN — AMLODIPINE BESYLATE 10 MG: 5 TABLET ORAL at 05:41

## 2022-06-25 RX ADMIN — CARVEDILOL 25 MG: 12.5 TABLET, FILM COATED ORAL at 17:33

## 2022-06-25 RX ADMIN — CLOPIDOGREL BISULFATE 75 MG: 75 TABLET ORAL at 07:36

## 2022-06-25 RX ADMIN — LOSARTAN POTASSIUM 100 MG: 25 TABLET, FILM COATED ORAL at 05:41

## 2022-06-25 RX ADMIN — HYDRALAZINE HYDROCHLORIDE 25 MG: 25 TABLET, FILM COATED ORAL at 21:24

## 2022-06-25 RX ADMIN — SENNOSIDES AND DOCUSATE SODIUM 2 TABLET: 50; 8.6 TABLET ORAL at 07:36

## 2022-06-25 ASSESSMENT — GAIT ASSESSMENTS
ASSISTIVE DEVICE: NONE;OTHER (COMMENTS)
GAIT LEVEL OF ASSIST: STANDBY ASSIST

## 2022-06-25 ASSESSMENT — ACTIVITIES OF DAILY LIVING (ADL)
BED_CHAIR_WHEELCHAIR_TRANSFER_DESCRIPTION: INCREASED TIME;SUPERVISION FOR SAFETY
TOILET_TRANSFER_DESCRIPTION: GRAB BAR;INCREASED TIME;SUPERVISION FOR SAFETY
BED_CHAIR_WHEELCHAIR_TRANSFER_DESCRIPTION: INCREASED TIME;SUPERVISION FOR SAFETY;VERBAL CUEING

## 2022-06-25 NOTE — CARE PLAN
Problem: Diabetes Management  Goal: Patient's ability to maintain appropriate glucose levels will be maintained or improve  Outcome: Progressing  Note: Fs at hs- 148 . No coverage needed. Snacks provided.     Problem: Fall Risk - Rehab  Goal: Patient will remain free from falls  Outcome: Progressing  Note: Leia Queen Fall risk Assessment Score: 9    Low fall risk interventions   - Call light within reach   - Yellow  socks   - Belongings within reach   - Bed in the lowest position        Problem: Pain - Standard  Goal: Alleviation of pain or a reduction in pain to the patient’s comfort goal  Outcome: Progressing  Note: Assessed for pain and discomfort, pain under control, needs  anticipated and attended .   The patient is Stable - Low risk of patient condition declining or worsening    Shift Goals  Clinical Goals: Safety  Patient Goals: Sleep well    Progress made toward(s) clinical / shift goals:  Pt free from fall and injury.

## 2022-06-25 NOTE — THERAPY
"Occupational Therapy  Daily Treatment     Patient Name: Reji Madrigal Jr.  Age:  56 y.o., Sex:  male  Medical Record #: [id number]  Today's Date: 6/25/2022     Precautions  Precautions: Fall Risk  Comments: (P) ZIO Patch         Subjective    \"I know I need to be able to write and draw for my job.\"  Patient having difficulty demonstrating pad to pad opposition with R hand,but notes emerging finger isolation movement and would like to work on this.      Objective       06/25/22 0901   OT Charge Group   Charges Yes   OT Neuromuscular Re-education / Balance 1   OT Therapy Activity 3   OT Total Time Spent   OT Individual Total Time Spent (Mins) 60   Precautions   Precautions Fall Risk   Comments ZIO Patch   Pain   Intervention Declines   Non Verbal Descriptors   Non Verbal Scale  Calm   Cognition    Orientation Level Oriented x 4   Level of Consciousness Alert   Functional Level of Assist   Bed, Chair, Wheelchair Transfer Standby Assist   Bed Chair Wheelchair Transfer Description Increased time;Supervision for safety   Fine Motor / Dexterity    Fine Motor / Dexterity Yes   Fine Motor / Dexterity Interventions Finger isolation activity at piano with R wrist support provided and initial hand over hand to initiate single key press/release.  Bilateral thumb isolation with wrist support increased in accuracy from ~55% bimanual accuracy to 85%.  R thumb single key strike accuracy 13/20.  R index middle alternating keystrike accuracy 20/30 with wrist support.  R ring/pinky accuracy 11/20 with R wrist support.  R keystrike isolation with 1-5 digits, 11/15 with median drift more notable with removal of wrist support.   Comments  Patient demonstrating increased finger isolation with repetition with improved performance with wrist stabilization.  May benefit from keyboarding with computer or adaptive writing utensil training. Pad to pad opposition continues to be compromised, while gross grasp, finger extension, and finger " isolation are improving.   Neuro-Muscular Treatments   Neuro-Muscular Treatments Weight Shift Right;Weight Shift Left;Verbal Cuing;Sequencing   Comments Standing boxing activity with patient standing EOM with wide slight stagger stance.  Aggressive therapy ball rally with RUE only with patient demonstrating 100% accuracy with 60 reps (1 seated RB) of patient hitting small therapy ball in the air following 5-10 ft toss from this therapist (student providing SBA EOM).  RUE/LUE sparing with verbal cues provided to maintain RUE neutral wrist position (50/70 reps) without LOB or need for seated RB.   Balance   Sitting Balance (Static) Good   Sitting Balance (Dynamic) Good   Standing Balance (Static) Fair +   Standing Balance (Dynamic) Fair   Weight Shift Sitting Good   Weight Shift Standing Fair   Skilled Intervention Compensatory Strategies   Bed Mobility    Supine to Sit Supervised   Skilled Intervention Compensatory Strategies   Interdisciplinary Plan of Care Collaboration   IDT Collaboration with  Physical Therapist Assistant (PTA)   Patient Position at End of Therapy In Bed;Call Light within Reach;Tray Table within Reach   Collaboration Comments CLOF       Assessment    Patient very motivated to continue to improve RUE gross and fine motor coordination with improved RUE finger isolation noted with seated single key strike activity at piano.  Patient demonstrates mild synergistic movement at end range of shoulder flexion but with cues can correct.  Increased RUE force demonstrated as boxing activity progressed with fading cues provided for neutral wrist position.     Strengths: Able to follow instructions, Alert and oriented, Effective communication skills, Good insight into deficits/needs, Independent prior level of function, Pleasant and cooperative, Willingly participates in therapeutic activities, Motivated for self care and independence  Barriers: Fatigue, Generalized weakness, Hemiparesis, Home accessibility,  Hypertension, Impaired balance, Impaired activity tolerance, Poor family support (diabetes, distal RUE weakness)    Plan    Continue OT POC with ongoing emphasis on RUE neuro retraining, strengthening, balance building, community mobility, and ADL modification PRN.     Passport items to be completed:  Perform bathroom transfers, complete dressing, complete feeding, get ready for the day, prepare a simple meal, participate in household tasks, adapt home for safety needs, demonstrate home exercise program, complete caregiver training     Occupational Therapy Goals (Active)       Problem: Bathing       Dates: Start: 06/21/22         Goal: STG-Within one week, patient will bathe at SPV level.        Dates: Start: 06/21/22         Goal Note filed on 06/22/22 1136 by Michelle Albarran Student       New admit, evaluated on 6/21                 Problem: Dressing       Dates: Start: 06/21/22         Goal: STG-Within one week, patient will dress UB at SPV level using laure technique.       Dates: Start: 06/21/22         Goal Note filed on 06/22/22 1136 by Michelle Albarran Student       New admit, evaluated on 6/21              Goal: STG-Within one week, patient will dress LB at SPV level using laure technique        Dates: Start: 06/21/22         Goal Note filed on 06/22/22 1136 by Michelle Albarran Student       New admit, evaluated on 6/21                 Problem: Functional Transfers       Dates: Start: 06/21/22         Goal: STG-Within one week, patient will transfer to toilet at SPV level        Dates: Start: 06/21/22         Goal Note filed on 06/22/22 1136 by Michelle Albarran Student       New admit, evaluated on 6/21                 Problem: OT Long Term Goals       Dates: Start: 06/21/22         Goal: LTG-By discharge, patient will complete basic self care tasks at Mod I - Ind level.       Dates: Start: 06/21/22            Goal: LTG-By discharge, patient will perform  bathroom transfers at Mod I - Ind level.       Dates: Start: 06/21/22            Goal: LTG-By discharge, patient will complete basic home management at Mod I - Ind level.       Dates: Start: 06/21/22

## 2022-06-25 NOTE — THERAPY
Physical Therapy   Daily Treatment     Patient Name: Reji Madrigal Jr.  Age:  56 y.o., Sex:  male  Medical Record #: [id number]  Today's Date: 6/25/2022     Precautions  Precautions: Fall Risk  Comments: Distal RUE weakness, ZIO patch    Subjective    Pt was seen [id number].  Pt was in bed upon arrival, stated he accidentally turned off the ziopatch by pressing it, confirmed w/ RN and it should still be working.     Objective       06/25/22 1400   PT Charge Group   PT Gait Training 3   PT Neuromuscular Re-Education / Balance 1   Supervising Physical Therapist Russ Coughlin   PT Total Time Spent   PT Individual Total Time Spent (Mins) 60   Pain   Intervention Declines   Pain 0 - 10 Group   Pain Rating Scale (NPRS) 0   Cognition    Level of Consciousness Alert   Gait Functional Level of Assist    Gait Level Of Assist Standby Assist   Assistive Device None;Other (Comments)  (gait belt)   Distance (Feet)   (indoor+outdoor~800 ft w/ 1 rest break)   Deviation Step To;Bradykinetic;Decreased Heel Strike;Decreased Base Of Support  (cues for arm swing and RLE step length)   Stairs Functional Level of Assist   Level of Assist with Stairs Standby Assist   # of Stairs Climbed   (4 inches x6, 6 inches x4)   Stairs Description Extra time;Hand rails;Supervision for safety;Verbal cueing  ([facility] only)   Transfer Functional Level of Assist   Bed, Chair, Wheelchair Transfer Standby Assist   Bed Chair Wheelchair Transfer Description Increased time;Supervision for safety;Verbal cueing   Toilet Transfers Supervised   Toilet Transfer Description Grab bar;Increased time;Supervision for safety  (w/o AD)   Bed Mobility    Supine to Sit Supervised   Sit to Supine Supervised   Sit to Stand Standby Assist   Scooting Supervised   Rolling Independent   Interdisciplinary Plan of Care Collaboration   IDT Collaboration with  Nursing   Patient Position at End of Therapy In Bed;Call Light within Reach;Tray Table within Reach;Phone within Reach    Collaboration Comments re:zio patch     Litegait training w/o harness w/ BUE support-  -total of 8 mins, speed 0.8-0.9 mph w/o rest break, focusing on R step length, R foot clearance and NGOC  -stepping over random sandbags; trail no UE support and pt is shuffling and demonstrated decrease step length w/ forward lean.  -stepping on/off the treadmill w/ CGA/SBA    Neuro-brian-  Supine<>sidelying<>prone on elbow<>prone on hand<> tall kneeling  Prone on hand push up x10.  Tall kneeling w/ anterior WS w/ CGA and cues x10.    Assessment    Pt tolerated session well, L shoulder fatigue while on the treadmill but was not able to perform proper gait w/o UEs support on treadmill. Cues for arm swing during ambulation. Pt's SBA w/o AD at this moment and he stated using SPC too much to manage and requested not to use SPC during session.    Strengths: Able to follow instructions, Alert and oriented, Effective communication skills, Pleasant and cooperative, Willingly participates in therapeutic activities, Independent prior level of function  Barriers: Fatigue, Hemiparesis, Home accessibility, Impaired balance    Plan     litegait, ambulation w/o AD or w/ SPC, stairs training w/ single HR, standing balance, neuro-brian.     Passport items to be completed:  Get in/out of bed safely, in/out of a vehicle, safely use mobility device, walk or wheel around home/community, navigate up and down stairs, show how to get up/down from the ground, ensure home is accessible, demonstrate HEP, complete caregiver training    Physical Therapy Problems (Active)       Problem: Balance       Dates: Start: 06/22/22         Goal: STG-Within one week, patient will achieve a 50/56 on the Pulido Balance Scale demonstrating low risk for falls and a clinical improvement during IRF stay.       Dates: Start: 06/22/22               Problem: Mobility       Dates: Start: 06/21/22         Goal: STG-Within one week, patient will ambulate community distances 150 feet  with SPC and SBA.        Dates: Start: 06/21/22         Goal Note filed on 06/22/22 0808 by Maggie Saul PTA       Evaluated yesterday, currently required CGA              Goal: STG-Within one week, patient will ambulate up/down flight of stairs with single hand rail and SBA.        Dates: Start: 06/21/22         Goal Note filed on 06/22/22 0808 by Maggie Saul PTA       Evaluated yesterday, have not attempted                 Problem: Mobility Transfers       Dates: Start: 06/21/22         Goal: STG-Within one week, patient will move supine to/from sitting independently.        Dates: Start: 06/21/22         Goal Note filed on 06/22/22 0808 by Maggie Saul PTA       Currently SBA              Goal: STG-Within one week, patient will transfer bed to chair IND with SPC.       Dates: Start: 06/22/22            Goal: STG-Within one week, patient will transfer in/out of car IND with a SPC.       Dates: Start: 06/22/22               Problem: PT-Long Term Goals       Dates: Start: 06/21/22         Goal: LTG-By discharge, patient will ambulate 250 feet with spc vs no AD and independent.        Dates: Start: 06/21/22            Goal: LTG-By discharge, patient will transfer one surface to another with SPC or no AD and mod I.        Dates: Start: 06/21/22            Goal: LTG-By discharge, patient will ambulate up/down flight of stairs with single hand rail and mod I.        Dates: Start: 06/21/22            Goal: LTG-By discharge, patient will transfer in/out of a car mod I.        Dates: Start: 06/21/22

## 2022-06-25 NOTE — THERAPY
Speech Language Pathology  Daily Treatment     Patient Name: Reji Madrigal Jr.  Age:  56 y.o., Sex:  male  Medical Record #: [id number]  Today's Date: 6/25/2022     Precautions  Precautions: Fall Risk  Comments: Distal RUE weakness, ZIO patch    Subjective    Pt pleasant and cooperative during tx.       Objective       06/25/22 1031   Treatment Charges   SLP Cognitive Skill Development First 15 Minutes 1   SLP Cognitive Skill Development Additional 15 Minutes 1   SLP Total Time Spent   SLP Individual Total Time Spent (Mins) 30   Cognition   Functional Math / Financial Management Supervision (5)       Assessment    Pt completed checkbook registrar task initiated last session.  Pt made calculations with 100% accuracy, and answered follow-up questions with 100% accuracy independently.      Strengths: Able to follow instructions, Making steady progress towards goals, Pleasant and cooperative, Willingly participates in therapeutic activities, Motivated for self care and independence  Barriers: Impaired functional cognition    Plan    Stroke education, high level executive function.         Speech Therapy Problems (Active)       Problem: Memory STGs       Dates: Start: 06/21/22         Goal: STG-Within one week, patient will learn and implement functional memory strategies to assist in recall of information related to stroke, hospitalization and daily planning with 80% provided SPV       Dates: Start: 06/21/22         Goal Note filed on 06/22/22 1308 by Luz Maria Torres MS,CCC-SLP       Recent eval                 Problem: Problem Solving STGs       Dates: Start: 06/21/22         Goal: STG-Within one week, patient will complete complex executive function tasks with 90% accuracy provided SPV       Dates: Start: 06/21/22         Goal Note filed on 06/22/22 1308 by Luz Maria Torres MS,CCC-SLP       Recent eval                 Problem: Speech/Swallowing LTGs       Dates: Start: 06/21/22         Goal: LTG-By  discharge, patient will complete complex problem solving and recall information with 80% accuracy and MOD I       Dates: Start: 06/21/22

## 2022-06-25 NOTE — PROGRESS NOTES
Hospital Medicine Daily Progress Note      Chief Complaint  Hypertension  Diabetes    Interval Problem Update  No 24 hour clinical changes.    Review of Systems  Review of Systems   Constitutional: Negative for chills and fever.   HENT: Negative.    Eyes: Negative.    Respiratory: Negative for cough and shortness of breath.    Cardiovascular: Negative for chest pain and palpitations.   Gastrointestinal: Negative for abdominal pain, nausea and vomiting.   Musculoskeletal: Negative.    Skin: Negative for itching and rash.   Neurological: Positive for focal weakness.   Endo/Heme/Allergies: Negative for polydipsia. Does not bruise/bleed easily.        Physical Exam  Temp:  [36.6 °C (97.8 °F)-36.9 °C (98.4 °F)] 36.6 °C (97.8 °F)  Pulse:  [68-89] 89  Resp:  [18] 18  BP: (104-159)/(65-88) 104/65  SpO2:  [97 %-98 %] 98 %    Physical Exam  Vitals reviewed.   Constitutional:       General: He is not in acute distress.     Appearance: Normal appearance. He is not ill-appearing.   HENT:      Head: Normocephalic and atraumatic.      Right Ear: External ear normal.      Left Ear: External ear normal.      Nose: Nose normal.      Mouth/Throat:      Pharynx: Oropharynx is clear.   Eyes:      General:         Right eye: No discharge.         Left eye: No discharge.      Extraocular Movements: Extraocular movements intact.      Conjunctiva/sclera: Conjunctivae normal.   Cardiovascular:      Rate and Rhythm: Rhythm irregular.   Pulmonary:      Effort: Pulmonary effort is normal. No respiratory distress.      Breath sounds: Normal breath sounds. No wheezing.   Abdominal:      General: Bowel sounds are normal. There is no distension.      Palpations: Abdomen is soft.      Tenderness: There is no abdominal tenderness.   Musculoskeletal:      Cervical back: Normal range of motion and neck supple.      Right lower leg: No edema.      Left lower leg: No edema.   Skin:     General: Skin is warm and dry.   Neurological:      Mental Status: He  is alert and oriented to person, place, and time.                   Assessment/Plan  * Ischemic stroke (HCC)- (present on admission)  Assessment & Plan  Has L HP  On ASA, Plavix, and Lipitor  Ongoing management per Physiatry    Vitamin D deficiency  Assessment & Plan  Vit D level 8  On high dose supplementation per Physiatry    Coronary artery disease involving native coronary artery of native heart without angina pectoris- (present on admission)  Assessment & Plan  H/O stent  On ASA, Plavix, Lipitor, Coreg, and Losartan    Paroxysmal atrial fibrillation (HCC)- (present on admission)  Assessment & Plan  Rate controlled on Coreg  On DAPT  ZioPatch placed per Physiatry    Type 2 diabetes mellitus with hyperglycemia, with long-term current use of insulin (HCC)- (present on admission)  Assessment & Plan  HbA1c 9.8  Observe serum glucose trends on Glargine and SSI  Outpt meds include Metformin and Lantus    Primary hypertension- (present on admission)  Assessment & Plan  On Norvasc, Coreg, and Losartan  Observe blood pressure trends     Full Code

## 2022-06-26 LAB
GLUCOSE BLD STRIP.AUTO-MCNC: 117 MG/DL (ref 65–99)
GLUCOSE BLD STRIP.AUTO-MCNC: 123 MG/DL (ref 65–99)
GLUCOSE BLD STRIP.AUTO-MCNC: 138 MG/DL (ref 65–99)
GLUCOSE BLD STRIP.AUTO-MCNC: 172 MG/DL (ref 65–99)
GLUCOSE BLD STRIP.AUTO-MCNC: 193 MG/DL (ref 65–99)

## 2022-06-26 PROCEDURE — 97129 THER IVNTJ 1ST 15 MIN: CPT

## 2022-06-26 PROCEDURE — 700102 HCHG RX REV CODE 250 W/ 637 OVERRIDE(OP): Performed by: HOSPITALIST

## 2022-06-26 PROCEDURE — 700111 HCHG RX REV CODE 636 W/ 250 OVERRIDE (IP): Performed by: PHYSICAL MEDICINE & REHABILITATION

## 2022-06-26 PROCEDURE — 99232 SBSQ HOSP IP/OBS MODERATE 35: CPT | Performed by: HOSPITALIST

## 2022-06-26 PROCEDURE — 700102 HCHG RX REV CODE 250 W/ 637 OVERRIDE(OP): Performed by: PHYSICAL MEDICINE & REHABILITATION

## 2022-06-26 PROCEDURE — 770010 HCHG ROOM/CARE - REHAB SEMI PRIVAT*

## 2022-06-26 PROCEDURE — 97130 THER IVNTJ EA ADDL 15 MIN: CPT

## 2022-06-26 PROCEDURE — 82962 GLUCOSE BLOOD TEST: CPT | Mod: 91

## 2022-06-26 PROCEDURE — A9270 NON-COVERED ITEM OR SERVICE: HCPCS | Performed by: HOSPITALIST

## 2022-06-26 PROCEDURE — A9270 NON-COVERED ITEM OR SERVICE: HCPCS | Performed by: PHYSICAL MEDICINE & REHABILITATION

## 2022-06-26 RX ADMIN — HYDRALAZINE HYDROCHLORIDE 25 MG: 25 TABLET, FILM COATED ORAL at 21:49

## 2022-06-26 RX ADMIN — HYDRALAZINE HYDROCHLORIDE 25 MG: 25 TABLET, FILM COATED ORAL at 07:42

## 2022-06-26 RX ADMIN — SENNOSIDES AND DOCUSATE SODIUM 2 TABLET: 50; 8.6 TABLET ORAL at 07:42

## 2022-06-26 RX ADMIN — CARVEDILOL 25 MG: 12.5 TABLET, FILM COATED ORAL at 17:27

## 2022-06-26 RX ADMIN — AMLODIPINE BESYLATE 10 MG: 5 TABLET ORAL at 05:37

## 2022-06-26 RX ADMIN — ASPIRIN 81 MG: 81 TABLET, COATED ORAL at 07:40

## 2022-06-26 RX ADMIN — ATORVASTATIN CALCIUM 80 MG: 40 TABLET, FILM COATED ORAL at 21:49

## 2022-06-26 RX ADMIN — LOSARTAN POTASSIUM 100 MG: 25 TABLET, FILM COATED ORAL at 05:38

## 2022-06-26 RX ADMIN — ENOXAPARIN SODIUM 40 MG: 40 INJECTION SUBCUTANEOUS at 17:30

## 2022-06-26 RX ADMIN — SENNOSIDES AND DOCUSATE SODIUM 2 TABLET: 50; 8.6 TABLET ORAL at 21:49

## 2022-06-26 RX ADMIN — CARVEDILOL 25 MG: 12.5 TABLET, FILM COATED ORAL at 07:40

## 2022-06-26 RX ADMIN — CLOPIDOGREL BISULFATE 75 MG: 75 TABLET ORAL at 07:41

## 2022-06-26 ASSESSMENT — PAIN DESCRIPTION - PAIN TYPE: TYPE: ACUTE PAIN

## 2022-06-26 ASSESSMENT — FIBROSIS 4 INDEX: FIB4 SCORE: 0.82

## 2022-06-26 NOTE — THERAPY
Speech Language Pathology  Daily Treatment     Patient Name: Reji Madrigal Jr.  Age:  56 y.o., Sex:  male  Medical Record #: [id number]  Today's Date: 6/26/2022     Precautions  Precautions: Fall Risk  Comments: ZIO Patch    Subjective    Pt pleasant and cooperative.     Objective       06/26/22 0833   Treatment Charges   SLP Cognitive Skill Development First 15 Minutes 1   SLP Cognitive Skill Development Additional 15 Minutes 1   SLP Total Time Spent   SLP Individual Total Time Spent (Mins) 30   Speech Language Pathologist Assigned   Assigned SLP / Extension DC ST       Assessment    Stroke education completed with written response provided to pt for further review. Pt with appropriate questions throughout and demonstrated good understanding of the information presented. Rec pt be dc from ST with increased PT/OT for remaining time at [facility] as for all SLP goal have been met. Pt in agreement with POC.     Strengths: Able to follow instructions, Making steady progress towards goals, Pleasant and cooperative, Willingly participates in therapeutic activities, Motivated for self care and independence  Barriers: Impaired functional cognition    Plan  Dc ST    Speech Therapy Problems (Active)       Problem: Memory STGs       Dates: Start: 06/21/22         Goal: STG-Within one week, patient will learn and implement functional memory strategies to assist in recall of information related to stroke, hospitalization and daily planning with 80% provided SPV       Dates: Start: 06/21/22         Goal Note filed on 06/22/22 1308 by Lu zMaria Torres MS,CCC-SLP       Recent eval                 Problem: Problem Solving STGs       Dates: Start: 06/21/22         Goal: STG-Within one week, patient will complete complex executive function tasks with 90% accuracy provided SPV       Dates: Start: 06/21/22         Goal Note filed on 06/22/22 1308 by Luz Maria Torres MS,CCC-SLP       Recent eval                 Problem:  Speech/Swallowing LTGs       Dates: Start: 06/21/22         Goal: LTG-By discharge, patient will complete complex problem solving and recall information with 80% accuracy and MOD I       Dates: Start: 06/21/22

## 2022-06-26 NOTE — CARE PLAN
The patient is Stable - Low risk of patient condition declining or worsening    Shift Goals  Clinical Goals: safety  Patient Goals: Sleep well    Problem: Bowel Elimination  Goal: Patient will participate in bowel management program  Outcome: Progressing: Scheduled senna-docusate 8.6-50 mg x2 tabs administered per MAR. Pt continent of lg, soft, formed stool today.     Problem: Mobility  Goal: Patient's capacity to carry out activities will improve  Outcome: Progressing: Pt ambulates, SBA, to and from bathroom with front wheel walker, steady. RUE remains visibly weak.

## 2022-06-26 NOTE — CARE PLAN
The patient is Stable - Low risk of patient condition declining or worsening    Shift Goals  Clinical Goals: safety  Patient Goals: Sleep well    Problem: Diabetes Management  Goal: Patient's ability to maintain appropriate glucose levels will be maintained or improve  Outcome: Progressing: FSBG 151, 163, 148, SS insulin and scheduled Lantus administered per MAR.      Problem: Fall Risk - Rehab  Goal: Patient will remain free from falls  Outcome: Progressing: pt oriented, not impulsive, uses call light appropriately with assist with transfers. Remains free from falls.

## 2022-06-26 NOTE — CARE PLAN
Problem: Speech/Swallowing LTGs  Goal: LTG-By discharge, patient will complete complex problem solving and recall information with 80% accuracy and MOD I  Outcome: Met     Problem: Problem Solving STGs  Goal: STG-Within one week, patient will complete complex executive function tasks with 90% accuracy provided SPV  Outcome: Met     Problem: Memory STGs  Goal: STG-Within one week, patient will learn and implement functional memory strategies to assist in recall of information related to stroke, hospitalization and daily planning with 80% provided SPV  Outcome: Met

## 2022-06-27 PROBLEM — R79.89 AZOTEMIA: Status: ACTIVE | Noted: 2022-06-27

## 2022-06-27 LAB
GLUCOSE BLD STRIP.AUTO-MCNC: 134 MG/DL (ref 65–99)
GLUCOSE BLD STRIP.AUTO-MCNC: 135 MG/DL (ref 65–99)
GLUCOSE BLD STRIP.AUTO-MCNC: 171 MG/DL (ref 65–99)
GLUCOSE BLD STRIP.AUTO-MCNC: 200 MG/DL (ref 65–99)

## 2022-06-27 PROCEDURE — 97535 SELF CARE MNGMENT TRAINING: CPT

## 2022-06-27 PROCEDURE — 97032 APPL MODALITY 1+ESTIM EA 15: CPT

## 2022-06-27 PROCEDURE — 97530 THERAPEUTIC ACTIVITIES: CPT

## 2022-06-27 PROCEDURE — A9270 NON-COVERED ITEM OR SERVICE: HCPCS | Performed by: PHYSICAL MEDICINE & REHABILITATION

## 2022-06-27 PROCEDURE — 770010 HCHG ROOM/CARE - REHAB SEMI PRIVAT*

## 2022-06-27 PROCEDURE — A9270 NON-COVERED ITEM OR SERVICE: HCPCS | Performed by: HOSPITALIST

## 2022-06-27 PROCEDURE — 97116 GAIT TRAINING THERAPY: CPT

## 2022-06-27 PROCEDURE — 97110 THERAPEUTIC EXERCISES: CPT

## 2022-06-27 PROCEDURE — 99232 SBSQ HOSP IP/OBS MODERATE 35: CPT | Performed by: HOSPITALIST

## 2022-06-27 PROCEDURE — 99232 SBSQ HOSP IP/OBS MODERATE 35: CPT | Performed by: PHYSICAL MEDICINE & REHABILITATION

## 2022-06-27 PROCEDURE — 97112 NEUROMUSCULAR REEDUCATION: CPT

## 2022-06-27 PROCEDURE — 700111 HCHG RX REV CODE 636 W/ 250 OVERRIDE (IP): Performed by: PHYSICAL MEDICINE & REHABILITATION

## 2022-06-27 PROCEDURE — 700102 HCHG RX REV CODE 250 W/ 637 OVERRIDE(OP): Performed by: PHYSICAL MEDICINE & REHABILITATION

## 2022-06-27 PROCEDURE — 700102 HCHG RX REV CODE 250 W/ 637 OVERRIDE(OP): Performed by: HOSPITALIST

## 2022-06-27 PROCEDURE — 82962 GLUCOSE BLOOD TEST: CPT

## 2022-06-27 RX ORDER — HYDRALAZINE HYDROCHLORIDE 25 MG/1
25 TABLET, FILM COATED ORAL EVERY 8 HOURS
Status: DISCONTINUED | OUTPATIENT
Start: 2022-06-27 | End: 2022-06-29 | Stop reason: HOSPADM

## 2022-06-27 RX ADMIN — ATORVASTATIN CALCIUM 80 MG: 40 TABLET, FILM COATED ORAL at 20:50

## 2022-06-27 RX ADMIN — SENNOSIDES AND DOCUSATE SODIUM 2 TABLET: 50; 8.6 TABLET ORAL at 09:17

## 2022-06-27 RX ADMIN — CARVEDILOL 25 MG: 12.5 TABLET, FILM COATED ORAL at 18:22

## 2022-06-27 RX ADMIN — ASPIRIN 81 MG: 81 TABLET, COATED ORAL at 09:15

## 2022-06-27 RX ADMIN — AMLODIPINE BESYLATE 10 MG: 5 TABLET ORAL at 05:20

## 2022-06-27 RX ADMIN — ENOXAPARIN SODIUM 40 MG: 40 INJECTION SUBCUTANEOUS at 18:22

## 2022-06-27 RX ADMIN — HYDRALAZINE HYDROCHLORIDE 25 MG: 25 TABLET, FILM COATED ORAL at 09:15

## 2022-06-27 RX ADMIN — CLOPIDOGREL BISULFATE 75 MG: 75 TABLET ORAL at 09:15

## 2022-06-27 RX ADMIN — CARVEDILOL 25 MG: 12.5 TABLET, FILM COATED ORAL at 09:15

## 2022-06-27 RX ADMIN — HYDRALAZINE HYDROCHLORIDE 25 MG: 25 TABLET, FILM COATED ORAL at 20:50

## 2022-06-27 RX ADMIN — LOSARTAN POTASSIUM 100 MG: 25 TABLET, FILM COATED ORAL at 05:20

## 2022-06-27 RX ADMIN — HYDRALAZINE HYDROCHLORIDE 25 MG: 25 TABLET, FILM COATED ORAL at 14:49

## 2022-06-27 RX ADMIN — SENNOSIDES AND DOCUSATE SODIUM 2 TABLET: 50; 8.6 TABLET ORAL at 20:50

## 2022-06-27 ASSESSMENT — ENCOUNTER SYMPTOMS
EYES NEGATIVE: 1
COUGH: 0
SHORTNESS OF BREATH: 0
ABDOMINAL PAIN: 0
NAUSEA: 0
BRUISES/BLEEDS EASILY: 0
PALPITATIONS: 0
POLYDIPSIA: 0
CHILLS: 0
MUSCULOSKELETAL NEGATIVE: 1
VOMITING: 0
FOCAL WEAKNESS: 1
FEVER: 0

## 2022-06-27 ASSESSMENT — GAIT ASSESSMENTS
DEVIATION: DECREASED HEEL STRIKE;DECREASED BASE OF SUPPORT
GAIT LEVEL OF ASSIST: CONTACT GUARD ASSIST
ASSISTIVE DEVICE: 4 WHEEL WALKER
DISTANCE (FEET): 220
GAIT LEVEL OF ASSIST: SUPERVISED

## 2022-06-27 ASSESSMENT — ACTIVITIES OF DAILY LIVING (ADL)
BED_CHAIR_WHEELCHAIR_TRANSFER_DESCRIPTION: ADAPTIVE EQUIPMENT;SUPERVISION FOR SAFETY
TOILET_TRANSFER_DESCRIPTION: ADAPTIVE EQUIPMENT;GRAB BAR;INCREASED TIME
TOILETING_LEVEL_OF_ASSIST_DESCRIPTION: ADAPTIVE EQUIPMENT;GRAB BAR;INCREASED TIME
BED_CHAIR_WHEELCHAIR_TRANSFER_DESCRIPTION: ADAPTIVE EQUIPMENT;SET-UP OF EQUIPMENT

## 2022-06-27 NOTE — PROGRESS NOTES
Received bedside shift report from Cleo BARAJAS RN regarding patient and assumed care. Patient awake, calm and stable, currently positioned in bed for comfort and safety; call light within reach. Denies pain or discomfort at this time. Will continue to monitor.

## 2022-06-27 NOTE — THERAPY
Physical Therapy   Daily Treatment     Patient Name: Reji Madrigal Jr.  Age:  56 y.o., Sex:  male  Medical Record #: [id number]  Today's Date: 6/27/2022     Precautions  Precautions: Fall Risk  Comments: ZIO Patch    Subjective    Pt was resting in bed upon arrival, agreeable to session.     Objective       06/27/22 1301   PT Charge Group   PT Gait Training 2   Supervising Physical Therapist Mae Soliman   PT Total Time Spent   PT Individual Total Time Spent (Mins) 30   Cognition    Level of Consciousness Alert   Gait Functional Level of Assist    Gait Level Of Assist Supervised   Assistive Device 4 Wheel Walker   Distance (Feet)   (indoor+outdoor ~ 600 ft)   Deviation Decreased Heel Strike;Decreased Base Of Support   Transfer Functional Level of Assist   Bed, Chair, Wheelchair Transfer Supervised   Bed Chair Wheelchair Transfer Description Adaptive equipment;Supervision for safety  (4WW)   Bed Mobility    Supine to Sit Modified Independent   Sit to Supine Modified Independent   Sit to Stand Supervised   Scooting Modified Independent   Interdisciplinary Plan of Care Collaboration   IDT Collaboration with  Physical Therapist   Patient Position at End of Therapy In Bed;Call Light within Reach;Tray Table within Reach;Phone within Reach   Collaboration Comments re:DME     Discussed d/c location- pt's friend is coming to [place] tomorrow/Wednesday and will stay with pt in hotel room until they find a place in [place]. This therapist is recommending 4WW as DME d/t pt's still unsteady while ambulating w/o AD, pt is in agreement for 4WW at discharge.     Education on brake management for 4WW.    Assessment    Pt tolerated session well ambulation w/ 4WW and is now in agreement to have 4WW as DME d/t he expressed that SPC was too much to manage.    Strengths: Able to follow instructions, Alert and oriented, Effective communication skills, Pleasant and cooperative, Willingly participates in therapeutic activities, Independent  prior level of function  Barriers: Fatigue, Hemiparesis, Home accessibility, Impaired balance    Plan    D/c [facility] tomorrow. Standing balance, outdoor amb w/ 4WW.     Passport items to be completed:  Get in/out of bed safely, in/out of a vehicle, safely use mobility device, walk or wheel around home/community, navigate up and down stairs, show how to get up/down from the ground, ensure home is accessible, demonstrate HEP, complete caregiver training    Physical Therapy Problems (Active)       Problem: Balance       Dates: Start: 06/22/22         Goal: STG-Within one week, patient will achieve a 50/56 on the Pulido Balance Scale demonstrating low risk for falls and a clinical improvement during IRF stay.       Dates: Start: 06/22/22               Problem: Mobility       Dates: Start: 06/21/22         Goal: STG-Within one week, patient will ambulate community distances 150 feet with SPC and SBA.        Dates: Start: 06/21/22         Goal Note filed on 06/22/22 0808 by Maggie Saul PTA       Evaluated yesterday, currently required CGA              Goal: STG-Within one week, patient will ambulate up/down flight of stairs with single hand rail and SBA.        Dates: Start: 06/21/22         Goal Note filed on 06/22/22 0808 by Maggie Saul PTA       Evaluated yesterday, have not attempted                 Problem: Mobility Transfers       Dates: Start: 06/21/22         Goal: STG-Within one week, patient will move supine to/from sitting independently.        Dates: Start: 06/21/22         Goal Note filed on 06/22/22 0808 by Maggie Saul PTA       Currently SBA              Goal: STG-Within one week, patient will transfer bed to chair IND with SPC.       Dates: Start: 06/22/22            Goal: STG-Within one week, patient will transfer in/out of car IND with a SPC.       Dates: Start: 06/22/22               Problem: PT-Long Term Goals       Dates: Start: 06/21/22         Goal: LTG-By discharge, patient will ambulate 250  feet with spc vs no AD and independent.        Dates: Start: 06/21/22            Goal: LTG-By discharge, patient will transfer one surface to another with SPC or no AD and mod I.        Dates: Start: 06/21/22            Goal: LTG-By discharge, patient will ambulate up/down flight of stairs with single hand rail and mod I.        Dates: Start: 06/21/22            Goal: LTG-By discharge, patient will transfer in/out of a car mod I.        Dates: Start: 06/21/22

## 2022-06-27 NOTE — PROGRESS NOTES
Hospital Medicine Daily Progress Note      Chief Complaint  Hypertension  Diabetes    Interval Problem Update  Pt denies new complaints but blood pressures still somewhat elevated.    Review of Systems  Review of Systems   Constitutional: Negative for chills and fever.   HENT: Negative.    Eyes: Negative.    Respiratory: Negative for cough and shortness of breath.    Cardiovascular: Negative for chest pain and palpitations.   Gastrointestinal: Negative for abdominal pain, nausea and vomiting.   Musculoskeletal: Negative.    Skin: Negative for itching and rash.   Neurological: Positive for focal weakness.   Endo/Heme/Allergies: Negative for polydipsia. Does not bruise/bleed easily.        Physical Exam  Temp:  [36.6 °C (97.8 °F)-36.9 °C (98.4 °F)] 36.6 °C (97.8 °F)  Pulse:  [68-89] 89  Resp:  [18] 18  BP: (104-159)/(65-88) 104/65  SpO2:  [97 %-98 %] 98 %    Physical Exam  Vitals reviewed.   Constitutional:       General: He is not in acute distress.     Appearance: Normal appearance. He is not ill-appearing.   HENT:      Head: Normocephalic and atraumatic.      Right Ear: External ear normal.      Left Ear: External ear normal.      Nose: Nose normal.      Mouth/Throat:      Pharynx: Oropharynx is clear.   Eyes:      General:         Right eye: No discharge.         Left eye: No discharge.      Extraocular Movements: Extraocular movements intact.      Conjunctiva/sclera: Conjunctivae normal.   Cardiovascular:      Rate and Rhythm: Rhythm irregular.   Pulmonary:      Effort: Pulmonary effort is normal. No respiratory distress.      Breath sounds: Normal breath sounds. No wheezing.   Abdominal:      General: Bowel sounds are normal. There is no distension.      Palpations: Abdomen is soft.      Tenderness: There is no abdominal tenderness.   Musculoskeletal:      Cervical back: Normal range of motion and neck supple.      Right lower leg: No edema.      Left lower leg: No edema.   Skin:     General: Skin is warm and  dry.   Neurological:      Mental Status: He is alert and oriented to person, place, and time.                   Assessment/Plan  * Ischemic stroke (HCC)- (present on admission)  Assessment & Plan  Has L HP  On ASA, Plavix, and Lipitor  Ongoing management per Physiatry    Azotemia  Assessment & Plan  Encourage PO fluids  Follow renal function    Vitamin D deficiency  Assessment & Plan  Vit D level 8  On high dose supplementation per Physiatry    Coronary artery disease involving native coronary artery of native heart without angina pectoris- (present on admission)  Assessment & Plan  H/O stent  On ASA, Plavix, Lipitor, Coreg, and Losartan    Paroxysmal atrial fibrillation (HCC)- (present on admission)  Assessment & Plan  Rate controlled on Coreg  On DAPT  ZioPatch placed per Physiatry    Type 2 diabetes mellitus with hyperglycemia, with long-term current use of insulin (HCC)- (present on admission)  Assessment & Plan  HbA1c 9.8  Observe serum glucose trends on Glargine and SSI  Outpt meds include Metformin and Lantus  Pt agreeable to discharging on Lantus only without resuming Metformin    Primary hypertension- (present on admission)  Assessment & Plan  Blood pressures suboptimal on max doses of Norvasc, Coreg, and Losartan  Will increase Hydralazine     Full Code

## 2022-06-27 NOTE — PROGRESS NOTES
"Rehab Progress Note     Date of Service: 6/27/2022  Chief Complaint: follow up stroke    Interval Events (Subjective)    Patient seen and examined today in the therapy gym.   He is working with PT on his balance.  He has had some return of wrist and finger extension.  His friends have moved him out of his apartment.  He feels ready for discharge home on Weds, and is planning on staying in [place]. His friend will help him find a new place to live.  Continues to have some hypertension, for which hospitalist has increased his hydralazine.  Patient wants to use [facility].  He has no new concerns or complaints today.     ROS: No changes to bowel, bladder, pain, mood, or sleep.       Objective:  VITAL SIGNS: /65   Pulse 89   Temp 36.6 °C (97.8 °F) (Oral)   Resp 18   Ht 1.803 m (5' 11\")   Wt 80 kg (176 lb 5.9 oz)   SpO2 98%   BMI 24.60 kg/m²   Gen: alert, no apparent distress  CV: Regular rate, regular rhythm  Resp: Clear to auscultation bilaterally  Neuro: right hand/wrist extension weakness, finger abduction weakness, +Angel's    Recent Results (from the past 72 hour(s))   POCT glucose device results    Collection Time: 06/24/22  5:14 PM   Result Value Ref Range    POC Glucose, Blood 128 (H) 65 - 99 mg/dL   POCT glucose device results    Collection Time: 06/24/22  8:53 PM   Result Value Ref Range    POC Glucose, Blood 148 (H) 65 - 99 mg/dL   CBC WITHOUT DIFFERENTIAL    Collection Time: 06/25/22  5:18 AM   Result Value Ref Range    WBC 5.9 4.8 - 10.8 K/uL    RBC 4.65 (L) 4.70 - 6.10 M/uL    Hemoglobin 13.7 (L) 14.0 - 18.0 g/dL    Hematocrit 40.9 (L) 42.0 - 52.0 %    MCV 88.0 81.4 - 97.8 fL    MCH 29.5 27.0 - 33.0 pg    MCHC 33.5 (L) 33.7 - 35.3 g/dL    RDW 39.8 35.9 - 50.0 fL    Platelet Count 262 164 - 446 K/uL    MPV 11.3 9.0 - 12.9 fL   Basic Metabolic Panel    Collection Time: 06/25/22  5:18 AM   Result Value Ref Range    Sodium 142 135 - 145 mmol/L    Potassium 3.6 3.6 - 5.5 mmol/L    " Chloride 109 96 - 112 mmol/L    Co2 22 20 - 33 mmol/L    Glucose 131 (H) 65 - 99 mg/dL    Bun 20 8 - 22 mg/dL    Creatinine 1.04 0.50 - 1.40 mg/dL    Calcium 9.4 8.5 - 10.5 mg/dL    Anion Gap 11.0 7.0 - 16.0   ESTIMATED GFR    Collection Time: 06/25/22  5:18 AM   Result Value Ref Range    GFR (CKD-EPI) 83 >60 mL/min/1.73 m 2   POCT glucose device results    Collection Time: 06/25/22  7:35 AM   Result Value Ref Range    POC Glucose, Blood 151 (H) 65 - 99 mg/dL   POCT glucose device results    Collection Time: 06/25/22 11:13 AM   Result Value Ref Range    POC Glucose, Blood 163 (H) 65 - 99 mg/dL   POCT glucose device results    Collection Time: 06/25/22  5:29 PM   Result Value Ref Range    POC Glucose, Blood 132 (H) 65 - 99 mg/dL   POCT glucose device results    Collection Time: 06/25/22  8:19 PM   Result Value Ref Range    POC Glucose, Blood 172 (H) 65 - 99 mg/dL   POCT glucose device results    Collection Time: 06/26/22  7:32 AM   Result Value Ref Range    POC Glucose, Blood 117 (H) 65 - 99 mg/dL   POCT glucose device results    Collection Time: 06/26/22 11:12 AM   Result Value Ref Range    POC Glucose, Blood 193 (H) 65 - 99 mg/dL   POCT glucose device results    Collection Time: 06/26/22  5:18 PM   Result Value Ref Range    POC Glucose, Blood 123 (H) 65 - 99 mg/dL   POCT glucose device results    Collection Time: 06/26/22  9:47 PM   Result Value Ref Range    POC Glucose, Blood 138 (H) 65 - 99 mg/dL   POCT glucose device results    Collection Time: 06/27/22  7:47 AM   Result Value Ref Range    POC Glucose, Blood 134 (H) 65 - 99 mg/dL   POCT glucose device results    Collection Time: 06/27/22 11:18 AM   Result Value Ref Range    POC Glucose, Blood 171 (H) 65 - 99 mg/dL       Current Facility-Administered Medications   Medication Frequency   • hydrALAZINE (APRESOLINE) tablet 25 mg Q8HRS   • insulin GLARGINE (Lantus,Semglee) injection QAM INSULIN   • vitamin D2 (Ergocalciferol) (Drisdol) capsule 50,000 Units Q7 DAYS    • insulin regular (HumuLIN R,NovoLIN R) injection 4X/DAY ACHS   • hydrOXYzine HCl (ATARAX) tablet 50 mg Q6HRS PRN   • melatonin tablet 3 mg HS PRN   • Respiratory Therapy Consult Continuous RT   • Pharmacy Consult Request ...Pain Management Review 1 Each PHARMACY TO DOSE   • hydrALAZINE (APRESOLINE) tablet 10 mg Q8HRS PRN   • acetaminophen (Tylenol) tablet 650 mg Q4HRS PRN   • lactulose 20 GM/30ML solution 30 mL QDAY PRN   • artificial tears ophthalmic solution 1 Drop PRN   • benzocaine-menthol (Cepacol) lozenge 1 Lozenge Q2HRS PRN   • mag hydrox-al hydrox-simeth (MAALOX PLUS ES or MYLANTA DS) suspension 20 mL Q2HRS PRN   • ondansetron (ZOFRAN ODT) dispertab 4 mg 4X/DAY PRN    Or   • ondansetron (ZOFRAN) syringe/vial injection 4 mg 4X/DAY PRN   • traZODone (DESYREL) tablet 50 mg QHS PRN   • sodium chloride (OCEAN) 0.65 % nasal spray 2 Spray PRN   • amLODIPine (NORVASC) tablet 10 mg Q DAY   • aspirin EC (ECOTRIN) tablet 81 mg DAILY   • atorvastatin (LIPITOR) tablet 80 mg Q EVENING   • carvedilol (COREG) tablet 25 mg BID WITH MEALS   • clopidogrel (PLAVIX) tablet 75 mg DAILY   • enoxaparin (Lovenox) inj 40 mg DAILY AT 1800   • losartan (COZAAR) tablet 100 mg Q DAY   • senna-docusate (PERICOLACE or SENOKOT S) 8.6-50 MG per tablet 2 Tablet BID    And   • polyethylene glycol/lytes (MIRALAX) PACKET 1 Packet QDAY PRN    And   • magnesium hydroxide (MILK OF MAGNESIA) suspension 30 mL QDAY PRN    And   • bisacodyl (DULCOLAX) suppository 10 mg QDAY PRN       Orders Placed This Encounter   Procedures   • Diet Order Diet: Consistent CHO (Diabetic) (please put utensils with built up , t- rocker knife and plate guard on tray for all meals.)     Standing Status:   Standing     Number of Occurrences:   1     Order Specific Question:   Diet:     Answer:   Consistent CHO (Diabetic) [4]     Comments:   please put utensils with built up , t- rocker knife and plate guard on tray for all meals.        Assessment:    This patient is a 56 y.o. male admitted for acute inpatient rehabilitation with Ischemic stroke (HCC).    I led and attended the weekly conference, and agree with the IDT conference documentation and plan of care as noted below.    Date of conference: 6/22/2022    Goals and barriers: See IDT note.    Biggest barriers: Right hand weakness, is right-hand dominant, impaired attention, impaired balance, stairs at his friend's home in [facility]    CM/social support: plan to discharge to [place] with friend    Anticipated DC date: 6/29    Outpatient: OT/PT    Equip: SPC, shower chair    Follow up: PCP, [facility], Dr. Silverman, cardiology    Rx: meds to beds        Problem List/Medical Decision Making and Plan:    Left corona radiata ischemic stroke  Right hemiparesis, mostly right arm/hand  Dominant  Continue full rehab program  PT/OT/SLP, 1 hr each discipline, 5 days per week  6/21: SLP to decrease to 30, other 30 for OT as greatest impairment is his right hand    Aspirin and Plavix x 3 weeks, then aspirin alone starting 7/12  Statin    Cardiac monitor placed 6/21 to check for atrial fibrillation (patient with history of), though this stroke is more likely lacunar, not activated until 6/23    Outpatient follow up with neurology, Dr. Silverman, cardiology, referrals made    Diabetes type 2 with hyperglycemia  Uncontrolled, HgbA1C 9.8  Glargine  Sliding scale insulin  Consult hospitalist  Patient needs glucometer at discharge  Used to be on Metformin, no longer needed per hospitalist      Diabetic peripheral neuropathy  Monitor need for gabapentin  No pain currently     Hypertension  Losartan  Carvedilol  Amlodipine  Hydralazine added   Appreciate hospitalist assistance     Paroxysmal atrial fibrillation  Carvedilol  Cardiac monitor placed 6/21, activated 6/23  Outpatient follow up with cardiology    Hypokalemia, resolved  Supplementation     Coronary artery disease  Status post stents 2014 placed  in [place]  Aspirin (on prior to admission)  Plavix (just for now as DAPT for stroke)    Vit D deficiency, 8  Continue high dose supplementation    Bowel program  Continue bowel medications  Last BM 6/23    Bladder program  Check PVRs - 26, 61  Not retaining       DVT prophylaxis  Lovenox    Total time:  25 minutes.  I spent greater than 50% of the time for patient care, counseling, and coordination on this date, including patient face-to face time, unit/floor time with review of records/pertinent lab data and studies, as well as discussing diagnostic evaluation/work up, planned therapeutic interventions, and future disposition of care, as per the interval events/subjective and the assessment and plan as noted above.    I have performed a physical exam, reviewed and updated ROS, as well as the assessment and plan today 6/27/2022. In review of note from 6/24/2022 there are no new changes except as documented above.          Aurora Gonzalez M.D.   Physical Medicine and Rehabilitation

## 2022-06-27 NOTE — PROGRESS NOTES
Hospital Medicine Daily Progress Note      Chief Complaint  Hypertension  Diabetes    Interval Problem Update  High blood pressures better w/ new medicine added.    Review of Systems  Review of Systems   Constitutional: Negative for chills and fever.   HENT: Negative.    Eyes: Negative.    Respiratory: Negative for cough and shortness of breath.    Cardiovascular: Negative for chest pain and palpitations.   Gastrointestinal: Negative for abdominal pain, nausea and vomiting.   Musculoskeletal: Negative.    Skin: Negative for itching and rash.   Neurological: Positive for focal weakness.   Endo/Heme/Allergies: Negative for polydipsia. Does not bruise/bleed easily.        Physical Exam  Temp:  [36.6 °C (97.8 °F)-36.9 °C (98.4 °F)] 36.6 °C (97.8 °F)  Pulse:  [68-89] 89  Resp:  [18] 18  BP: (104-159)/(65-88) 104/65  SpO2:  [97 %-98 %] 98 %    Physical Exam  Vitals reviewed.   Constitutional:       General: He is not in acute distress.     Appearance: Normal appearance. He is not ill-appearing.   HENT:      Head: Normocephalic and atraumatic.      Right Ear: External ear normal.      Left Ear: External ear normal.      Nose: Nose normal.      Mouth/Throat:      Pharynx: Oropharynx is clear.   Eyes:      General:         Right eye: No discharge.         Left eye: No discharge.      Extraocular Movements: Extraocular movements intact.      Conjunctiva/sclera: Conjunctivae normal.   Cardiovascular:      Rate and Rhythm: Rhythm irregular.   Pulmonary:      Effort: Pulmonary effort is normal. No respiratory distress.      Breath sounds: Normal breath sounds. No wheezing.   Abdominal:      General: Bowel sounds are normal. There is no distension.      Palpations: Abdomen is soft.      Tenderness: There is no abdominal tenderness.   Musculoskeletal:      Cervical back: Normal range of motion and neck supple.      Right lower leg: No edema.      Left lower leg: No edema.   Skin:     General: Skin is warm and dry.   Neurological:       Mental Status: He is alert and oriented to person, place, and time.                   Assessment/Plan  * Ischemic stroke (HCC)- (present on admission)  Assessment & Plan  Has L HP  On ASA, Plavix, and Lipitor  Ongoing management per Physiatry    Azotemia  Assessment & Plan  Encourage PO fluids  Follow renal function    Vitamin D deficiency  Assessment & Plan  Vit D level 8  On high dose supplementation per Physiatry    Coronary artery disease involving native coronary artery of native heart without angina pectoris- (present on admission)  Assessment & Plan  H/O stent  On ASA, Plavix, Lipitor, Coreg, and Losartan    Paroxysmal atrial fibrillation (HCC)- (present on admission)  Assessment & Plan  Rate controlled on Coreg  On DAPT  ZioPatch placed per Physiatry    Type 2 diabetes mellitus with hyperglycemia, with long-term current use of insulin (HCC)- (present on admission)  Assessment & Plan  HbA1c 9.8  Observe serum glucose trends on Glargine and SSI  Outpt meds include Metformin and Lantus  Pt agreeable to discharging on Lantus only without resuming Metformin    Primary hypertension- (present on admission)  Assessment & Plan  Blood pressure trends improved w/ addition of Hydralazine  Continue Norvasc, Coreg, and Losartan     Full Code

## 2022-06-27 NOTE — PROGRESS NOTES
Hospital Medicine Daily Progress Note      Chief Complaint  Hypertension  Diabetes    Interval Problem Update  Blood pressures and blood sugars both high.  Pt asymptomatic.    Review of Systems  Review of Systems   Constitutional: Negative for chills and fever.   HENT: Negative.    Eyes: Negative.    Respiratory: Negative for cough and shortness of breath.    Cardiovascular: Negative for chest pain and palpitations.   Gastrointestinal: Negative for abdominal pain, nausea and vomiting.   Musculoskeletal: Negative.    Skin: Negative for itching and rash.   Neurological: Positive for focal weakness.   Endo/Heme/Allergies: Negative for polydipsia. Does not bruise/bleed easily.        Physical Exam  Temp:  [36.6 °C (97.8 °F)-36.9 °C (98.4 °F)] 36.6 °C (97.8 °F)  Pulse:  [68-89] 89  Resp:  [18] 18  BP: (104-159)/(65-88) 104/65  SpO2:  [97 %-98 %] 98 %    Physical Exam  Vitals reviewed.   Constitutional:       General: He is not in acute distress.     Appearance: Normal appearance. He is not ill-appearing.   HENT:      Head: Normocephalic and atraumatic.      Right Ear: External ear normal.      Left Ear: External ear normal.      Nose: Nose normal.      Mouth/Throat:      Pharynx: Oropharynx is clear.   Eyes:      General:         Right eye: No discharge.         Left eye: No discharge.      Extraocular Movements: Extraocular movements intact.      Conjunctiva/sclera: Conjunctivae normal.   Cardiovascular:      Rate and Rhythm: Rhythm irregular.   Pulmonary:      Effort: Pulmonary effort is normal. No respiratory distress.      Breath sounds: Normal breath sounds. No wheezing.   Abdominal:      General: Bowel sounds are normal. There is no distension.      Palpations: Abdomen is soft.      Tenderness: There is no abdominal tenderness.   Musculoskeletal:      Cervical back: Normal range of motion and neck supple.      Right lower leg: No edema.      Left lower leg: No edema.   Skin:     General: Skin is warm and dry.    Neurological:      Mental Status: He is alert and oriented to person, place, and time.                   Assessment/Plan  * Ischemic stroke (HCC)- (present on admission)  Assessment & Plan  Has L HP  On ASA, Plavix, and Lipitor  Ongoing management per Physiatry    Vitamin D deficiency  Assessment & Plan  Vit D level 8  On high dose supplementation per Physiatry    Coronary artery disease involving native coronary artery of native heart without angina pectoris- (present on admission)  Assessment & Plan  H/O stent  On ASA, Plavix, Lipitor, Coreg, and Losartan    Paroxysmal atrial fibrillation (HCC)- (present on admission)  Assessment & Plan  Rate controlled on Coreg  On DAPT  ZioPatch placed per Physiatry    Type 2 diabetes mellitus with hyperglycemia, with long-term current use of insulin (HCC)- (present on admission)  Assessment & Plan  HbA1c 9.8  Increase Glargine for hyperglycemic trends  Continue SSI  Outpt meds include Metformin and Lantus    Primary hypertension- (present on admission)  Assessment & Plan  Blood pressures remain elevated on Norvasc, Coreg, and Losartan  May need 4th agent  Check F/U labs in AM     Full Code

## 2022-06-27 NOTE — THERAPY
Physical Therapy   Daily Treatment     Patient Name: Reji Madrigal Jr.  Age:  56 y.o., Sex:  male  Medical Record #: [id number]  Today's Date: 6/27/2022     Precautions  Precautions: Fall Risk  Comments: ZIO Patch    Subjective    Pt resting in bed, willing to participate     Objective       06/27/22 0901   PT Charge Group   PT Gait Training 1   PT Therapeutic Exercise 1   PT Neuromuscular Re-Education / Balance 2   PT Total Time Spent   PT Individual Total Time Spent (Mins) 60   Gait Functional Level of Assist    Gait Level Of Assist Contact Guard Assist   Assistive Device None  (gait belt)   Distance (Feet) 220   # of Times Distance was Traveled 2   Transfer Functional Level of Assist   Bed, Chair, Wheelchair Transfer Supervised   Sitting Lower Body Exercises   Nustep Resistance Level 1  (5 minutes with BLE's and RUE only, hand  assist applied)   Neuro-Muscular Treatments   Neuro-Muscular Treatments Anterior weight shift;Facilitation;Joint Approximation;Postural Facilitation;Sequencing;Tactile Cuing;Verbal Cuing;Weight Shift Right;Weight Shift Left   Comments neuro re-ed gait/ coordination and balance training with agility ladder for walking forward/ emphasis on heel strike 200 ft x 3 with RUE support only at // bars. Lateral stepping / lateral wt shifting 3 x 20 B with target dots for coordination, RUE support only. Standing on Airex foam pad with BUE hands clasped 3 x 20 for chest press and PNF lift/ chop patterning       Assessment    Pt demonstrates impaired dynamic balance and RLE strength/ coordination deficits. Completed standing/ balance and gait endurance as noted but intermittently short of breath with effort.    Strengths: Able to follow instructions, Alert and oriented, Effective communication skills, Pleasant and cooperative, Willingly participates in therapeutic activities, Independent prior level of function  Barriers: Fatigue, Hemiparesis, Home accessibility, Impaired balance    Plan    D/c  irfpai tomorrow. Standing balance, outdoor amb w/ 4WW.     Passport items to be completed:  Get in/out of bed safely, in/out of a vehicle, safely use mobility device, walk or wheel around home/community, navigate up and down stairs, show how to get up/down from the ground, ensure home is accessible, demonstrate HEP, complete caregiver training    Physical Therapy Problems (Active)       Problem: Balance       Dates: Start: 06/22/22         Goal: STG-Within one week, patient will achieve a 50/56 on the Pulido Balance Scale demonstrating low risk for falls and a clinical improvement during IRF stay.       Dates: Start: 06/22/22               Problem: Mobility       Dates: Start: 06/21/22         Goal: STG-Within one week, patient will ambulate community distances 150 feet with SPC and SBA.        Dates: Start: 06/21/22         Goal Note filed on 06/22/22 0808 by Maggie Saul PTA       Evaluated yesterday, currently required CGA              Goal: STG-Within one week, patient will ambulate up/down flight of stairs with single hand rail and SBA.        Dates: Start: 06/21/22         Goal Note filed on 06/22/22 0808 by Maggie Saul PTA       Evaluated yesterday, have not attempted                 Problem: Mobility Transfers       Dates: Start: 06/21/22         Goal: STG-Within one week, patient will move supine to/from sitting independently.        Dates: Start: 06/21/22         Goal Note filed on 06/22/22 0808 by Maggie Saul PTA       Currently SBA              Goal: STG-Within one week, patient will transfer bed to chair IND with SPC.       Dates: Start: 06/22/22            Goal: STG-Within one week, patient will transfer in/out of car IND with a SPC.       Dates: Start: 06/22/22               Problem: PT-Long Term Goals       Dates: Start: 06/21/22         Goal: LTG-By discharge, patient will ambulate 250 feet with spc vs no AD and independent.        Dates: Start: 06/21/22            Goal: LTG-By discharge, patient  will transfer one surface to another with SPC or no AD and mod I.        Dates: Start: 06/21/22            Goal: LTG-By discharge, patient will ambulate up/down flight of stairs with single hand rail and mod I.        Dates: Start: 06/21/22            Goal: LTG-By discharge, patient will transfer in/out of a car mod I.        Dates: Start: 06/21/22

## 2022-06-27 NOTE — CARE PLAN
Problem: Knowledge Deficit - Standard  Goal: Patient and family/care givers will demonstrate understanding of plan of care, disease process/condition, diagnostic tests and medications  Outcome: Progressing  Note: Pt agrees with plan of care tonight regarding medications and safety.  Will continue to monitor patient.      Problem: Fall Risk - Rehab  Goal: Patient will remain free from falls  Outcome: Progressing  Note: Leia Queen Fall risk Assessment Score: 9      Low fall risk interventions   - Call light within reach   - Yellow  socks   - Belongings within reach   - Bed in the lowest position          The patient is Stable - Low risk of patient condition declining or worsening    Shift Goals  Clinical Goals: safety  Patient Goals: Sleep well    Progress made toward(s) clinical / shift goals:  progressing

## 2022-06-27 NOTE — THERAPY
Occupational Therapy  Daily Treatment     Patient Name: Reji Madrigal Jr.  Age:  56 y.o., Sex:  male  Medical Record #: [id number]  Today's Date: 6/27/2022     Precautions  Precautions: Fall Risk  Comments: ZIO Patch         Subjective    Pt seen from 10:00-10:30 and 2:00-3:00, see below for details.      Objective     06/27/22 1001   OT Charge Group   OT Electrical Stimulation Attended 1   OT Self Care / ADL 3   OT Neuromuscular Re-education / Balance 1   OT Therapy Activity 1   OT Total Time Spent   OT Individual Total Time Spent (Mins) 90   Functional Level of Assist   Grooming Modified Independent;Seated  (oral care seated in w/c at sink)   Grooming Description Seated in wheelchair at sink   Bathing Supervision  (supervision when standing, modified independent seated on tub bench)   Bathing Description Adaptive equipment;Grab bar;Hand held shower;Tub bench;Assit with back;Increased time;Initial preparation for task   Upper Body Dressing Supervision  (setup only)   Upper Body Dressing Description Initial preparation for task   Lower Body Dressing Supervision  (threaded legs through seated in w/c, stood with 4WW to pull over hips)   Lower Body Dressing Description Increased time;Initial preparation for task   Toileting Supervision  (distant supervision)   Toileting Description Adaptive equipment;Grab bar;Increased time   Bed, Chair, Wheelchair Transfer Supervised   Bed Chair Wheelchair Transfer Description Adaptive equipment;Set-up of equipment  (4WW)   Toilet Transfers Supervised  (ambulated into bathroom with 4WW)   Toilet Transfer Description Adaptive equipment;Grab bar;Increased time   Tub / Shower Transfers Supervised  (ambulated into bathroom with 4WW, SPT tub bench to w/c after shower)   Tub Shower Transfer Description Adaptive equipment;Grab bar;Shower bench;Set-up of equipment   Neuro-Muscular Treatments   Neuro-Muscular Treatments Electrical Stimulation;Facilitation;Tactile Cuing;Sequencing;Verbal  Cuing   Comments NMES to R wrist and finger extensors - parameters: 35 Hz, 200 uS, 20 mA alternating on and off with pt performing functional grasp/release of lotion bottle and bean bag while timing release and finger extension to NMES stimulation. Pt also targeted isolated finger extension with NMES with palm flat on table and in gravity eliminated planes.   Bed Mobility    Supine to Sit Modified Independent   Interdisciplinary Plan of Care Collaboration   IDT Collaboration with  Physical Therapist Assistant (PTA);Occupational Therapist   Patient Position at End of Therapy Edge of Bed;Call Light within Reach;Tray Table within Reach;Phone within Reach   Collaboration Comments CLOF, POC discussed with primary therapists     10:00-10:30: Pt completed NMES neuro re-ed activities for R finger extension as detailed above.     2:00-3:00: Pt performed ADL routine as detailed above. At end of session, pt completed community distance functional mobility in facility with 4WW ambulating from room -> lobby -> main gym -> nursing station -> room, no RB.     Assessment    Pt tolerated OT session well. Tolerated NMES with no c/o pain or discomfort. Needed verbal cues to time active extension with e-stim. Pt demo'd good safety awareness during ADL routine and completed at an overall supervision-setup level. Pt reported his lease is ending the day after he is scheduled to discharge and his friends have moved all his belongings into storage. Pt reported his friend from [place] is coming to stay with him and they will stay in a hotel until he finds a permanent place to live. Recommended to pt he stay in an ADA accessible hotel room to have grab bars and a shower chair in the bathroom until he knows what his permanent bathroom setup will be.     Strengths: Able to follow instructions, Alert and oriented, Effective communication skills, Good insight into deficits/needs, Independent prior level of function, Pleasant and  cooperative, Willingly participates in therapeutic activities, Motivated for self care and independence  Barriers: Fatigue, Generalized weakness, Hemiparesis, Home accessibility, Hypertension, Impaired balance, Impaired activity tolerance, Poor family support (diabetes, distal RUE weakness)    Plan    Continue OT POC with ongoing emphasis on RUE neuro retraining, strengthening, balance building, community mobility, and ADL modification PRN.     Passport items to be completed:  Perform bathroom transfers, complete dressing, complete feeding, get ready for the day, prepare a simple meal, participate in household tasks, adapt home for safety needs, demonstrate home exercise program, complete caregiver training     Occupational Therapy Goals (Active)       Problem: Bathing       Dates: Start: 06/21/22         Goal: STG-Within one week, patient will bathe at SPV level.        Dates: Start: 06/21/22         Goal Note filed on 06/22/22 1136 by Michelle Albarran Student       New admit, evaluated on 6/21                 Problem: Dressing       Dates: Start: 06/21/22         Goal: STG-Within one week, patient will dress UB at SPV level using laure technique.       Dates: Start: 06/21/22         Goal Note filed on 06/22/22 1136 by Michelle Albarran Student       New admit, evaluated on 6/21              Goal: STG-Within one week, patient will dress LB at SPV level using laure technique        Dates: Start: 06/21/22         Goal Note filed on 06/22/22 1136 by Michelle Albarran Student       New admit, evaluated on 6/21                 Problem: Functional Transfers       Dates: Start: 06/21/22         Goal: STG-Within one week, patient will transfer to toilet at SPV level        Dates: Start: 06/21/22         Goal Note filed on 06/22/22 1136 by Michelle Albarran Student       New admit, evaluated on 6/21                 Problem: OT Long Term Goals       Dates: Start: 06/21/22          Goal: LTG-By discharge, patient will complete basic self care tasks at Mod I - Ind level.       Dates: Start: 06/21/22            Goal: LTG-By discharge, patient will perform bathroom transfers at Mod I - Ind level.       Dates: Start: 06/21/22            Goal: LTG-By discharge, patient will complete basic home management at Mod I - Ind level.       Dates: Start: 06/21/22

## 2022-06-27 NOTE — PROGRESS NOTES
Hospital Medicine Daily Progress Note      Chief Complaint  Hypertension  Diabetes    Interval Problem Update  No overnight issues.  Labs reviewed.    Review of Systems  Review of Systems   Constitutional: Negative for chills and fever.   HENT: Negative.    Eyes: Negative.    Respiratory: Negative for cough and shortness of breath.    Cardiovascular: Negative for chest pain and palpitations.   Gastrointestinal: Negative for abdominal pain, nausea and vomiting.   Musculoskeletal: Negative.    Skin: Negative for itching and rash.   Neurological: Positive for focal weakness.   Endo/Heme/Allergies: Negative for polydipsia. Does not bruise/bleed easily.        Physical Exam  Temp:  [36.6 °C (97.8 °F)-36.9 °C (98.4 °F)] 36.6 °C (97.8 °F)  Pulse:  [68-89] 89  Resp:  [18] 18  BP: (104-159)/(65-88) 104/65  SpO2:  [97 %-98 %] 98 %    Physical Exam  Vitals reviewed.   Constitutional:       General: He is not in acute distress.     Appearance: Normal appearance. He is not ill-appearing.   HENT:      Head: Normocephalic and atraumatic.      Right Ear: External ear normal.      Left Ear: External ear normal.      Nose: Nose normal.      Mouth/Throat:      Pharynx: Oropharynx is clear.   Eyes:      General:         Right eye: No discharge.         Left eye: No discharge.      Extraocular Movements: Extraocular movements intact.      Conjunctiva/sclera: Conjunctivae normal.   Cardiovascular:      Rate and Rhythm: Rhythm irregular.   Pulmonary:      Effort: Pulmonary effort is normal. No respiratory distress.      Breath sounds: Normal breath sounds. No wheezing.   Abdominal:      General: Bowel sounds are normal. There is no distension.      Palpations: Abdomen is soft.      Tenderness: There is no abdominal tenderness.   Musculoskeletal:      Cervical back: Normal range of motion and neck supple.      Right lower leg: No edema.      Left lower leg: No edema.   Skin:     General: Skin is warm and dry.   Neurological:      Mental  Status: He is alert and oriented to person, place, and time.                   Assessment/Plan  * Ischemic stroke (HCC)- (present on admission)  Assessment & Plan  Has L HP  On ASA, Plavix, and Lipitor  Ongoing management per Physiatry    Azotemia  Assessment & Plan  Encourage PO fluids  Follow renal function    Vitamin D deficiency  Assessment & Plan  Vit D level 8  On high dose supplementation per Physiatry    Coronary artery disease involving native coronary artery of native heart without angina pectoris- (present on admission)  Assessment & Plan  H/O stent  On ASA, Plavix, Lipitor, Coreg, and Losartan    Paroxysmal atrial fibrillation (HCC)- (present on admission)  Assessment & Plan  Rate controlled on Coreg  On DAPT  ZioPatch placed per Physiatry    Type 2 diabetes mellitus with hyperglycemia, with long-term current use of insulin (HCC)- (present on admission)  Assessment & Plan  HbA1c 9.8  Observe serum glucose trends on Glargine and SSI  Outpt meds include Metformin and Lantus  Pt agreeable to discharging on Lantus only without resuming Metformin    Primary hypertension- (present on admission)  Assessment & Plan  Blood pressures remain elevated on Norvasc, Coreg, and Losartan  Start low dose Hydralazine     Full Code

## 2022-06-28 LAB
GLUCOSE BLD STRIP.AUTO-MCNC: 111 MG/DL (ref 65–99)
GLUCOSE BLD STRIP.AUTO-MCNC: 137 MG/DL (ref 65–99)
GLUCOSE BLD STRIP.AUTO-MCNC: 165 MG/DL (ref 65–99)
GLUCOSE BLD STRIP.AUTO-MCNC: 167 MG/DL (ref 65–99)

## 2022-06-28 PROCEDURE — 700111 HCHG RX REV CODE 636 W/ 250 OVERRIDE (IP): Performed by: PHYSICAL MEDICINE & REHABILITATION

## 2022-06-28 PROCEDURE — 700102 HCHG RX REV CODE 250 W/ 637 OVERRIDE(OP): Performed by: HOSPITALIST

## 2022-06-28 PROCEDURE — RXMED WILLOW AMBULATORY MEDICATION CHARGE: Performed by: PHYSICAL MEDICINE & REHABILITATION

## 2022-06-28 PROCEDURE — 97112 NEUROMUSCULAR REEDUCATION: CPT

## 2022-06-28 PROCEDURE — 700102 HCHG RX REV CODE 250 W/ 637 OVERRIDE(OP): Performed by: PHYSICAL MEDICINE & REHABILITATION

## 2022-06-28 PROCEDURE — 97116 GAIT TRAINING THERAPY: CPT | Mod: CQ

## 2022-06-28 PROCEDURE — 99233 SBSQ HOSP IP/OBS HIGH 50: CPT | Performed by: PHYSICAL MEDICINE & REHABILITATION

## 2022-06-28 PROCEDURE — 770010 HCHG ROOM/CARE - REHAB SEMI PRIVAT*

## 2022-06-28 PROCEDURE — 97530 THERAPEUTIC ACTIVITIES: CPT | Mod: CQ

## 2022-06-28 PROCEDURE — 97530 THERAPEUTIC ACTIVITIES: CPT

## 2022-06-28 PROCEDURE — 82962 GLUCOSE BLOOD TEST: CPT

## 2022-06-28 PROCEDURE — A9270 NON-COVERED ITEM OR SERVICE: HCPCS | Performed by: HOSPITALIST

## 2022-06-28 PROCEDURE — A9270 NON-COVERED ITEM OR SERVICE: HCPCS | Performed by: PHYSICAL MEDICINE & REHABILITATION

## 2022-06-28 PROCEDURE — 97110 THERAPEUTIC EXERCISES: CPT

## 2022-06-28 PROCEDURE — 97535 SELF CARE MNGMENT TRAINING: CPT

## 2022-06-28 PROCEDURE — 99232 SBSQ HOSP IP/OBS MODERATE 35: CPT | Performed by: HOSPITALIST

## 2022-06-28 RX ORDER — CALCIUM CITRATE/VITAMIN D3 200MG-6.25
TABLET ORAL
Qty: 100 STRIP | Refills: 0 | Status: SHIPPED | OUTPATIENT
Start: 2022-06-28

## 2022-06-28 RX ORDER — LOSARTAN POTASSIUM 100 MG/1
100 TABLET ORAL DAILY
Qty: 30 TABLET | Refills: 0 | Status: SHIPPED | OUTPATIENT
Start: 2022-06-29

## 2022-06-28 RX ORDER — ASPIRIN 81 MG/1
81 TABLET ORAL DAILY
Qty: 30 TABLET | Refills: 0 | Status: SHIPPED | OUTPATIENT
Start: 2022-06-28

## 2022-06-28 RX ORDER — DIPHENHYDRAMINE HYDROCHLORIDE 25 MG/1
CAPSULE, LIQUID FILLED ORAL
Qty: 1 KIT | Refills: 0 | Status: SHIPPED | OUTPATIENT
Start: 2022-06-28

## 2022-06-28 RX ORDER — LANCETS 30 GAUGE
EACH MISCELLANEOUS
Qty: 100 EACH | Refills: 0 | Status: SHIPPED | OUTPATIENT
Start: 2022-06-28

## 2022-06-28 RX ORDER — CLOPIDOGREL BISULFATE 75 MG/1
75 TABLET ORAL DAILY
Qty: 14 TABLET | Refills: 0 | Status: SHIPPED | OUTPATIENT
Start: 2022-06-28 | End: 2022-07-13

## 2022-06-28 RX ORDER — ERGOCALCIFEROL 1.25 MG/1
50000 CAPSULE ORAL
Qty: 4 CAPSULE | Refills: 0 | Status: SHIPPED | OUTPATIENT
Start: 2022-07-05 | End: 2022-07-27

## 2022-06-28 RX ORDER — AMOXICILLIN 250 MG
2 CAPSULE ORAL 2 TIMES DAILY
Qty: 120 TABLET | Refills: 0 | COMMUNITY
Start: 2022-06-28

## 2022-06-28 RX ORDER — INSULIN GLARGINE-YFGN 100 [IU]/ML
20 INJECTION, SOLUTION SUBCUTANEOUS EVERY MORNING
Qty: 3 ML | Refills: 0 | Status: SHIPPED | OUTPATIENT
Start: 2022-06-28 | End: 2022-07-14 | Stop reason: SDUPTHER

## 2022-06-28 RX ORDER — ATORVASTATIN CALCIUM 80 MG/1
80 TABLET, FILM COATED ORAL EVERY EVENING
Qty: 30 TABLET | Refills: 0 | Status: SHIPPED | OUTPATIENT
Start: 2022-06-28

## 2022-06-28 RX ORDER — HYDRALAZINE HYDROCHLORIDE 25 MG/1
25 TABLET, FILM COATED ORAL EVERY 8 HOURS
Qty: 90 TABLET | Refills: 0 | Status: SHIPPED | OUTPATIENT
Start: 2022-06-28

## 2022-06-28 RX ORDER — CARVEDILOL 25 MG/1
25 TABLET ORAL 2 TIMES DAILY WITH MEALS
Qty: 60 TABLET | Refills: 0 | Status: SHIPPED | OUTPATIENT
Start: 2022-06-28

## 2022-06-28 RX ORDER — AMLODIPINE BESYLATE 10 MG/1
10 TABLET ORAL DAILY
Qty: 30 TABLET | Refills: 0 | Status: SHIPPED | OUTPATIENT
Start: 2022-06-29

## 2022-06-28 RX ADMIN — HYDRALAZINE HYDROCHLORIDE 25 MG: 25 TABLET, FILM COATED ORAL at 14:26

## 2022-06-28 RX ADMIN — ASPIRIN 81 MG: 81 TABLET, COATED ORAL at 09:05

## 2022-06-28 RX ADMIN — ATORVASTATIN CALCIUM 80 MG: 40 TABLET, FILM COATED ORAL at 20:38

## 2022-06-28 RX ADMIN — HYDRALAZINE HYDROCHLORIDE 25 MG: 25 TABLET, FILM COATED ORAL at 20:38

## 2022-06-28 RX ADMIN — CLOPIDOGREL BISULFATE 75 MG: 75 TABLET ORAL at 09:05

## 2022-06-28 RX ADMIN — CARVEDILOL 25 MG: 12.5 TABLET, FILM COATED ORAL at 09:05

## 2022-06-28 RX ADMIN — AMLODIPINE BESYLATE 10 MG: 5 TABLET ORAL at 05:33

## 2022-06-28 RX ADMIN — SENNOSIDES AND DOCUSATE SODIUM 2 TABLET: 50; 8.6 TABLET ORAL at 20:37

## 2022-06-28 RX ADMIN — SENNOSIDES AND DOCUSATE SODIUM 2 TABLET: 50; 8.6 TABLET ORAL at 09:05

## 2022-06-28 RX ADMIN — ERGOCALCIFEROL 50000 UNITS: 1.25 CAPSULE ORAL at 11:42

## 2022-06-28 RX ADMIN — CARVEDILOL 25 MG: 12.5 TABLET, FILM COATED ORAL at 18:07

## 2022-06-28 RX ADMIN — ENOXAPARIN SODIUM 40 MG: 40 INJECTION SUBCUTANEOUS at 18:07

## 2022-06-28 RX ADMIN — HYDRALAZINE HYDROCHLORIDE 25 MG: 25 TABLET, FILM COATED ORAL at 05:33

## 2022-06-28 RX ADMIN — LOSARTAN POTASSIUM 100 MG: 25 TABLET, FILM COATED ORAL at 05:33

## 2022-06-28 ASSESSMENT — ENCOUNTER SYMPTOMS
SHORTNESS OF BREATH: 0
NAUSEA: 0
ABDOMINAL PAIN: 0
DIARRHEA: 0
NERVOUS/ANXIOUS: 0
VOMITING: 0
CHILLS: 0
FEVER: 0

## 2022-06-28 ASSESSMENT — ACTIVITIES OF DAILY LIVING (ADL)
TOILETING_LEVEL_OF_ASSIST: ABLE TO COMPLETE TOILETING WITHOUT ASSIST
BED_CHAIR_WHEELCHAIR_TRANSFER_DESCRIPTION: ADAPTIVE EQUIPMENT
TOILET_TRANSFER_DESCRIPTION: ADAPTIVE EQUIPMENT
TOILETING_LEVEL_OF_ASSIST_DESCRIPTION: ADAPTIVE EQUIPMENT;GRAB BAR;INCREASED TIME
SHOWER_TRANSFER_LEVEL_OF_ASSIST: ABLE TO COMPLETE SHOWER TRANSFER WITHOUT ASSIST
TUB_SHOWER_TRANSFER_DESCRIPTION: ADAPTIVE EQUIPMENT;GRAB BAR;SHOWER BENCH
TOILET_TRANSFER_LEVEL_OF_ASSIST: ABLE TO COMPLETE TOILET TRANSFER WITHOUT ASSIST
BED_CHAIR_WHEELCHAIR_TRANSFER_DESCRIPTION: ADAPTIVE EQUIPMENT
TOILET_TRANSFER_DESCRIPTION: ADAPTIVE EQUIPMENT;GRAB BAR;INCREASED TIME

## 2022-06-28 ASSESSMENT — GAIT ASSESSMENTS
GAIT LEVEL OF ASSIST: MODIFIED INDEPENDENT
DEVIATION: DECREASED HEEL STRIKE;DECREASED BASE OF SUPPORT
ASSISTIVE DEVICE: 4 WHEEL WALKER
DISTANCE (FEET): 250

## 2022-06-28 NOTE — THERAPY
Physical Therapy   Daily Treatment     Patient Name: Reji Madrigal Jr.  Age:  56 y.o., Sex:  male  Medical Record #: [id number]  Today's Date: 6/28/2022     Precautions  Precautions: Fall Risk  Comments: ZIO Patch    Subjective    Patient is seated up at edge of bed, agreeable to participate. Is eager to go home tomorrow     Objective       06/28/22 1301   PT Charge Group   PT Therapeutic Exercise 2   PT Total Time Spent   PT Individual Total Time Spent (Mins) 30   Standing Lower Body Exercises   Standing Lower Body Exercises Yes   Hamstring Curl Bilateral   (2x20)   Hip Extension Bilateral   (2x20)   Hip Abduction Bilateral  (2x20)   Heel Rise Bilateral  (2x20)   Other Exercises hip hike 2 x20 B   Interdisciplinary Plan of Care Collaboration   IDT Collaboration with  Physical Therapist Assistant (PTA)   Patient Position at End of Therapy Seated;Edge of Bed;Call Light within Reach;Tray Table within Reach;Phone within Reach   Collaboration Comments goal resolution, POC   Strengths & Barriers   Strengths Able to follow instructions;Adequate strength;Good balance;Good carryover of learning;Good endurance;Good insight into deficits/needs;Independent prior level of function;Making steady progress towards goals;Manages pain appropriately;Motivated for self care and independence;Pleasant and cooperative;Willingly participates in therapeutic activities   Barriers Fatigue;Hemiparesis       Assessment    Patient demonstrates good carryover of standing exercise program. Handout provided.    Strengths: (P) Able to follow instructions, Adequate strength, Good balance, Good carryover of learning, Good endurance, Good insight into deficits/needs, Independent prior level of function, Making steady progress towards goals, Manages pain appropriately, Motivated for self care and independence, Pleasant and cooperative, Willingly participates in therapeutic activities  Barriers: (P) Fatigue, Hemiparesis    Plan    Goal resolution for  d/c    Passport items completed:  Get in/out of bed safely, in/out of a vehicle, safely use mobility device, walk or wheel around home/community, navigate up and down stairs, show how to get up/down from the ground, ensure home is accessible, demonstrate HEP, complete caregiver training    Physical Therapy Problems (Active)       Problem: Balance       Dates: Start: 06/22/22         Goal: STG-Within one week, patient will achieve a 50/56 on the Pulido Balance Scale demonstrating low risk for falls and a clinical improvement during IRF stay.       Dates: Start: 06/22/22               Problem: Mobility       Dates: Start: 06/21/22         Goal: STG-Within one week, patient will ambulate community distances 150 feet with SPC and SBA.        Dates: Start: 06/21/22         Goal Note filed on 06/22/22 0808 by Maggie Saul PTA       Evaluated yesterday, currently required CGA              Goal: STG-Within one week, patient will ambulate up/down flight of stairs with single hand rail and SBA.        Dates: Start: 06/21/22         Goal Note filed on 06/22/22 0808 by Maggie Saul PTA       Evaluated yesterday, have not attempted                 Problem: Mobility Transfers       Dates: Start: 06/21/22         Goal: STG-Within one week, patient will move supine to/from sitting independently.        Dates: Start: 06/21/22         Goal Note filed on 06/22/22 0808 by Maggie Saul PTA       Currently SBA              Goal: STG-Within one week, patient will transfer bed to chair IND with SPC.       Dates: Start: 06/22/22            Goal: STG-Within one week, patient will transfer in/out of car IND with a SPC.       Dates: Start: 06/22/22               Problem: PT-Long Term Goals       Dates: Start: 06/21/22         Goal: LTG-By discharge, patient will ambulate 250 feet with spc vs no AD and independent.        Dates: Start: 06/21/22            Goal: LTG-By discharge, patient will transfer one surface to another with SPC or no AD  and mod I.        Dates: Start: 06/21/22            Goal: LTG-By discharge, patient will ambulate up/down flight of stairs with single hand rail and mod I.        Dates: Start: 06/21/22            Goal: LTG-By discharge, patient will transfer in/out of a car mod I.        Dates: Start: 06/21/22

## 2022-06-28 NOTE — THERAPY
Physical Therapy   Daily Treatment     Patient Name: Reji Madrigal Jr.  Age:  56 y.o., Sex:  male  Medical Record #: [id number]  Today's Date: 6/28/2022     Precautions  Precautions: Fall Risk  Comments: ZIO Patch    Subjective    Pt was resting in bed upon arrival, agreeable to session.     Objective       06/28/22 0831   PT Charge Group   PT Gait Training 2   PT Therapeutic Activities 2   Supervising Physical Therapist Mae Soliman   PT Total Time Spent   PT Individual Total Time Spent (Mins) 60   Pain   Intervention Declines   Pain 0 - 10 Group   Pain Rating Scale (NPRS) 0   Cognition    Level of Consciousness Alert   Gait Functional Level of Assist    Gait Level Of Assist Modified Independent   Assistive Device 4 Wheel Walker   Distance (Feet) 250   # of Times Distance was Traveled 2   Deviation Decreased Heel Strike;Decreased Base Of Support   Stairs Functional Level of Assist   Level of Assist with Stairs Supervised   # of Stairs Climbed 12   Stairs Description Extra time;Hand rails;Supervision for safety   Transfer Functional Level of Assist   Bed, Chair, Wheelchair Transfer Modified Independent   Bed Chair Wheelchair Transfer Description Adaptive equipment  (4WW)   Toilet Transfers Modified Independent   Toilet Transfer Description Adaptive equipment  (4WW)   Bed Mobility    Supine to Sit Modified Independent   Sit to Supine Modified Independent   Sit to Stand Modified Independent   Scooting Independent   Rolling Independent   Interdisciplinary Plan of Care Collaboration   IDT Collaboration with  Physical Therapist;Occupational Therapist   Patient Position at End of Therapy Edge of Bed;Call Light within Reach;Tray Table within Reach;Phone within Reach   Collaboration Comments CLOF, POC     Completed d/c irfpai assessment and floor xfer w/ SBA and verbal cues.    Treadmill for gait training for 12 mins, speed 0.8-1.2 mph. Total 1108 ft w/ SPV, no LOBs throughout.    Assessment    Pt tolerated session well,  he's now Maria R in room w/ 4WW, updated nursing and board for CLOF. Completed gait training on treadmill w/ higher gait speed and good heel to toe pattern during higher speed.    Strengths: Able to follow instructions, Alert and oriented, Effective communication skills, Pleasant and cooperative, Willingly participates in therapeutic activities, Independent prior level of function  Barriers: Fatigue, Hemiparesis, Home accessibility, Impaired balance    Plan    D/c tomorrow.    Passport items to be completed:  Completed    Physical Therapy Problems (Active)       Problem: Balance       Dates: Start: 06/22/22         Goal: STG-Within one week, patient will achieve a 50/56 on the Pulido Balance Scale demonstrating low risk for falls and a clinical improvement during IRF stay.       Dates: Start: 06/22/22               Problem: Mobility       Dates: Start: 06/21/22         Goal: STG-Within one week, patient will ambulate community distances 150 feet with SPC and SBA.        Dates: Start: 06/21/22         Goal Note filed on 06/22/22 0808 by Maggie Saul PTA       Evaluated yesterday, currently required CGA              Goal: STG-Within one week, patient will ambulate up/down flight of stairs with single hand rail and SBA.        Dates: Start: 06/21/22         Goal Note filed on 06/22/22 0808 by Maggie Saul PTA       Evaluated yesterday, have not attempted                 Problem: Mobility Transfers       Dates: Start: 06/21/22         Goal: STG-Within one week, patient will move supine to/from sitting independently.        Dates: Start: 06/21/22         Goal Note filed on 06/22/22 0808 by Maggie Saul PTA       Currently SBA              Goal: STG-Within one week, patient will transfer bed to chair IND with SPC.       Dates: Start: 06/22/22            Goal: STG-Within one week, patient will transfer in/out of car IND with a SPC.       Dates: Start: 06/22/22               Problem: PT-Long Term Goals       Dates: Start:  06/21/22         Goal: LTG-By discharge, patient will ambulate 250 feet with spc vs no AD and independent.        Dates: Start: 06/21/22            Goal: LTG-By discharge, patient will transfer one surface to another with SPC or no AD and mod I.        Dates: Start: 06/21/22            Goal: LTG-By discharge, patient will ambulate up/down flight of stairs with single hand rail and mod I.        Dates: Start: 06/21/22            Goal: LTG-By discharge, patient will transfer in/out of a car mod I.        Dates: Start: 06/21/22

## 2022-06-28 NOTE — CARE PLAN
Problem: Mobility  Goal: STG-Within one week, patient will ambulate up/down flight of stairs with single hand rail and SBA.   Outcome: Met     Problem: Mobility Transfers  Goal: STG-Within one week, patient will move supine to/from sitting independently.   Outcome: Met     Problem: PT-Long Term Goals  Goal: LTG-By discharge, patient will ambulate up/down flight of stairs with single hand rail and mod I.   Outcome: Met  Goal: LTG-By discharge, patient will transfer in/out of a car mod I.   Outcome: Met     Problem: Mobility  Goal: STG-Within one week, patient will ambulate community distances 150 feet with SPC and SBA.   Outcome: Discharged - Not Met     Problem: Mobility Transfers  Goal: STG-Within one week, patient will transfer bed to chair IND with SPC.  Outcome: Discharged - Not Met  Goal: STG-Within one week, patient will transfer in/out of car IND with a SPC.  Outcome: Discharged - Not Met     Problem: PT-Long Term Goals  Goal: LTG-By discharge, patient will ambulate 250 feet with spc vs no AD and independent.   Outcome: Discharged - Not Met  Goal: LTG-By discharge, patient will transfer one surface to another with SPC or no AD and mod I.   Outcome: Discharged - Not Met     Problem: Balance  Goal: STG-Within one week, patient will achieve a 50/56 on the Pulido Balance Scale demonstrating low risk for falls and a clinical improvement during IRF stay.  Outcome: Discharged - Not Met

## 2022-06-28 NOTE — DISCHARGE INSTRUCTIONS
[facility] NURSING DISCHARGE INSTRUCTIONS    Blood Pressure: 112/67  Weight: 80 kg (176 lb 5.9 oz)  Nursing recommendations for Reji Madrigal Jr. at time of discharge are as follows:  Patient and friend verbalized understanding of all discharge instructions and prescriptions.     Review all your home medications and newly ordered medications with your doctor and/or pharmacist. Follow medication instructions as directed by your doctor and/or pharmacist.    Pain Management:   Discharge Pain Medication Instructions:  Comfort Goal: Comfort with Movement, Perform Activity, Stay Alert  Notify your primary care provider if pain is unrelieved with these measures, if the pain is new, or increased in intensity.    Discharge Skin Characteristics: Warm, Dry  Discharge Skin Exam: Clear     Skin / Wound Care Instructions: Please contact your primary care physician for any change in skin integrity.     If You Have Surgical Incisions / Wounds:  Monitor surgical site(s) for signs of increased swelling, redness or symptoms of drainage from the site or fever as this could indicate signs and symptoms of infection. If these symptoms are noted, notifiy your primary care provider.      Discharge Safety Instructions: No Supervision Needed     Discharge Safety Concerns: Weakness  The interdisciplinary team has made recommendation that you supervision in the house due to above reasons.   Anti-embolic stockings are not required.    Discharge Diet: ADA Diet     Discharge Liquids: Thin Liquids  Discharge Bowel Function: Continent  Please contact your primary care physician for any changes in bowel habits.  Discharge Bowel Program:    Discharge Bladder Function: Continent  Discharge Urinary Devices: None      Nursing Discharge Plan:        Case Management Discharge Instructions:   Discharge Location:    Agency Name/Address/Phone:    Home Health:    Outpatient Services:    DME Provider/Phone:    Medical Equipment  Ordered:    Prescription Faxed to:        Discharge Medication Instructions:  Below are the medications your physician expects you to take upon discharge:  Occupational Therapy Discharge Instructions for Reji Madrigal Jr.    6/28/2022    Level of Assist Required for Eating: Able to Complete Eating without Assist  Level of Assist Required for Grooming: Able to Complete Grooming without Assist  Level of Assist Required for Dressing: Able to Complete Dressing without Assist  Level of Assist Required for Toileting: Able to Complete Toileting without Assist  Level of Assist Required for Toilet Transfer: Able to Complete Toilet Transfer without Assist  Equipment for Toilet Transfer: Grab Bars by Toilet  Level of Assist Required for Bathing: Able to Complete Bathing without Assist  Equipment for Bathing: Shower Chair, Grab Bars in Tub / Shower, Hand Held Shower Head  Level of Assist Required for Shower Transfer: Able to Complete Shower Transfer without Assist  Equipment for Shower Transfer: Grab Bars in Tub / Shower, Shower Chair  Level of Assist Required for Home Mgmt: Requires Supervision with Home Management  Level of Assist Required for Meal Prep: Requires Supervision with Meal Preparation  Driving: Please Contact Physician Prior to Driving  Home Exercise Program: Refer to Home Exercise Program Handout for Details    It was great working with you, Reji! We will miss you and wish you all the best!  -Eleazar OT      Physical Therapy Discharge Instructions for Reji Madrigal Jr.    6/28/2022    Level of Assist Required for Ambulation: No Assist on Flat Surfaces, Supervision on Curbs, Supervision on Stairs  Distance Patient May Ambulate: community  Device Recommended for Ambulation: 4-Wheeled Walker  Level of Assist Required for Transfers: Requires No Assist  Device Recommended for Transfers: 4-Wheeled Walker  Home Exercise Program: Refer to Home Exercise Program Handout for Details  Prosthesis /  Orthosis Recommendation / Location: No Prosthesis  or Orthosis Recommended  Keep up the great work Reji! - Maggie    Stroke Prevention  Some medical conditions and lifestyle choices can lead to a higher risk for a stroke. You can help to prevent a stroke by making nutrition, lifestyle, and other changes.  What nutrition changes can be made?    Eat healthy foods.  Choose foods that are high in fiber. These include:  Fresh fruits.  Fresh vegetables.  Whole grains.  Eat at least 5 or more servings of fruits and vegetables each day. Try to fill half of your plate at each meal with fruits and vegetables.  Choose lean protein foods. These include:  Lowfat (lean) cuts of meat.  Chicken without skin.  Fish.  Tofu.  Beans.  Nuts.  Eat low-fat dairy products.  Avoid foods that:  Are high in salt (sodium).  Have saturated fat.  Have trans fat.  Have cholesterol.  Are processed.  Are premade.  Follow eating guidelines as told by your doctor. These may include:  Reducing how many calories you eat and drink each day.  Limiting how much salt you eat or drink each day to 1,500 milligrams (mg).  Using only healthy fats for cooking. These include:  Olive oil.  Canola oil.  Sunflower oil.  Counting how many carbohydrates you eat and drink each day.  What lifestyle changes can be made?  Try to stay at a healthy weight. Talk to your doctor about what a good weight is for you.  Get at least 30 minutes of moderate physical activity at least 5 days a week. This can include:  Fast walking.  Biking.  Swimming.  Do not use any products that have nicotine or tobacco. This includes cigarettes and e-cigarettes. If you need help quitting, ask your doctor. Avoid being around tobacco smoke in general.  Limit how much alcohol you drink to no more than 1 drink a day for nonpregnant women and 2 drinks a day for men. One drink equals 12 oz of beer, 5 oz of wine, or 1½ oz of hard liquor.  Do not use drugs.  Avoid taking birth control pills. Talk to your  "doctor about the risks of taking birth control pills if:  You are over 35 years old.  You smoke.  You get migraines.  You have had a blood clot.  What other changes can be made?  Manage your cholesterol.  It is important to eat a healthy diet.  If your cholesterol cannot be managed through your diet, you may also need to take medicines. Take medicines as told by your doctor.  Manage your diabetes.  It is important to eat a healthy diet and to exercise regularly.  If your blood sugar cannot be managed through diet and exercise, you may need to take medicines. Take medicines as told by your doctor.  Control your high blood pressure (hypertension).  Try to keep your blood pressure below 130/80. This can help lower your risk of stroke.  It is important to eat a healthy diet and to exercise regularly.  If your blood pressure cannot be managed through diet and exercise, you may need to take medicines. Take medicines as told by your doctor.  Ask your doctor if you should check your blood pressure at home.  Have your blood pressure checked every year. Do this even if your blood pressure is normal.  Talk to your doctor about getting checked for a sleep disorder. Signs of this can include:  Snoring a lot.  Feeling very tired.  Take over-the-counter and prescription medicines only as told by your doctor. These may include aspirin or blood thinners (antiplatelets or anticoagulants).  Make sure that any other medical conditions you have are managed.  Where to find more information  American Stroke Association: www.strokeassociation.org  National Stroke Association: www.stroke.org  Get help right away if:  You have any symptoms of stroke. \"BE FAST\" is an easy way to remember the main warning signs:  B - Balance. Signs are dizziness, sudden trouble walking, or loss of balance.  E - Eyes. Signs are trouble seeing or a sudden change in how you see.  F - Face. Signs are sudden weakness or loss of feeling of the face, or the face or " eyelid drooping on one side.  A - Arms. Signs are weakness or loss of feeling in an arm. This happens suddenly and usually on one side of the body.  S - Speech. Signs are sudden trouble speaking, slurred speech, or trouble understanding what people say.  T - Time. Time to call emergency services. Write down what time symptoms started.  You have other signs of stroke, such as:  A sudden, very bad headache with no known cause.  Feeling sick to your stomach (nausea).  Throwing up (vomiting).  Jerky movements you cannot control (seizure).  These symptoms may represent a serious problem that is an emergency. Do not wait to see if the symptoms will go away. Get medical help right away. Call your local emergency services (911 in the [place] Do not drive yourself to the hospital.  Summary  You can prevent a stroke by eating healthy, exercising, not smoking, drinking less alcohol, and treating other health problems, such as diabetes, high blood pressure, or high cholesterol.  Do not use any products that contain nicotine or tobacco, such as cigarettes and e-cigarettes.  Get help right away if you have any signs or symptoms of a stroke.  This information is not intended to replace advice given to you by your health care provider. Make sure you discuss any questions you have with your health care provider.  Document Released: 06/18/2013 Document Revised: 02/13/2020 Document Reviewed: 03/21/2018  Elsevier Patient Education © 2020 Elsevier Inc[employer]  Depression / Suicide Risk    As you are discharged from this [facility] facility, it is important to learn how to keep safe from harming yourself.    Recognize the warning signs:  Abrupt changes in personality, positive or negative- including increase in energy   Giving away possessions  Change in eating patterns- significant weight changes-  positive or negative  Change in sleeping patterns- unable to sleep or sleeping all the time   Unwillingness or inability to  "communicate  Depression  Unusual sadness, discouragement and loneliness  Talk of wanting to die  Neglect of personal appearance   Rebelliousness- reckless behavior  Withdrawal from people/activities they love  Confusion- inability to concentrate     If you or a loved one observes any of these behaviors or has concerns about self-harm, here's what you can do:  Talk about it- your feelings and reasons for harming yourself  Remove any means that you might use to hurt yourself (examples: pills, rope, extension cords, firearm)  Get professional help from the community (Mental Health, Substance Abuse, psychological counseling)  Do not be alone:Call your Safe Contact- someone whom you trust who will be there for you.  Call your local CRISIS HOTLINE 324-5751 or 407-647-5017  Call your local Children's Mobile Crisis Response Team [place] (977) 370-3720 or www.Wideo  Call the toll free National Suicide Prevention Hotlines   National Suicide Prevention Lifeline 951-278-ABOC (6282)  [place] Line Network 800-SUICIDE (563-2109)    Prevent Falls in Your Home    \"Falling once doubles your chance of falling again\"        -Center for Disease Control and Prevention    Falls in the home can lead to serious injury (fractures, brain injuries), hospitalizations, increased medical costs, and could even be fatal.  The good news is, there are many precautions you can take to avoid falls in your home and help keep you safe:     If prescribed an assistive device (walker, crutches), use as instructed by the healthcare provider\"   Remove any tripping hazards from your home, including loose cords, throw rugs and clutter  Keep a nightlight on in dark (hallways, bathrooms, etc)   Get up slowly, to make sure you feel okay before getting up  Be aware of any side effects of your medications: some medications may make you dizzy  Place a non-skid rubber mat in your shower or tub-consider a shower bench or chair if unsteady on your " feet  Wear supportive shoes or non-skid socks when moving around  Start an exercise program once approved by your provider.  If you are feeling weak following a hospital stay, talk to your doctor about home health or outpatient therapy programs designed to help rebuild your strength and endurance

## 2022-06-28 NOTE — THERAPY
Occupational Therapy  Daily Treatment     Patient Name: Reji Madrigal Jr.  Age:  56 y.o., Sex:  male  Medical Record #: [id number]  Today's Date: 6/28/2022     Precautions  Precautions: Fall Risk  Comments: ZIO Patch         Subjective    Pt seated on EOB upon arrival, pleasant and agreeable to OT session.       Objective       06/28/22 0931   OT Charge Group   OT Neuromuscular Re-education / Balance 1   OT Therapy Activity 1   OT Total Time Spent   OT Individual Total Time Spent (Mins) 30   Functional Level of Assist   Bed, Chair, Wheelchair Transfer Modified Independent  (4WW)   Hand Strengthening   Hand Strengthening Right ;Right Pinch   Comment See note for details   Interdisciplinary Plan of Care Collaboration   IDT Collaboration with  Occupational Therapist   Patient Position at End of Therapy Edge of Bed;Call Light within Reach;Tray Table within Reach;Phone within Reach  (pt Mod I in room with 4WW)   Collaboration Comments primary OT for CLOF       Pt compelted the following hand strengthening/neuro re-edu of the RUE:    - Increasingly resistive clothespins (Yellow, red, and green) using a lateral pinch to place on horizontal bars.    - Standard clothespins, using lateral pinch to open and place on rim on bucket (able to complete x5)    - Orange theraputty given for gross , lateral pinch, and start to complete precision pinches (tripod and tip to tip)    Pt completed functional mobility at 4WW- room<>main gym.     Assessment    Pt tolerated session well with a focus on neuro re-edu and strengthening of RUE. Pt able to complete all activities with increase in ability to use thumb for fine motor activities/strengthening with lateral pinch. Pt still limited in ability to complete tip to tip pinch or tripod pinch, but provided edu to use putty to increase repetition of movements.    Strengths: Able to follow instructions, Alert and oriented, Effective communication skills, Good insight into  deficits/needs, Independent prior level of function, Pleasant and cooperative, Willingly participates in therapeutic activities, Motivated for self care and independence  Barriers: Fatigue, Generalized weakness, Hemiparesis, Home accessibility, Hypertension, Impaired balance, Impaired activity tolerance, Poor family support (diabetes, distal RUE weakness)    Plan    Anticipate d/c 6/29- d/c IRF-EBONY;  Continue OT POC with ongoing emphasis on RUE neuro retraining, strengthening, balance building, community mobility, and ADL modification PRN.       Occupational Therapy Goals (Active)       Problem: Bathing       Dates: Start: 06/21/22         Goal: STG-Within one week, patient will bathe at SPV level.        Dates: Start: 06/21/22         Goal Note filed on 06/22/22 1136 by Michelle Albarran Student       New admit, evaluated on 6/21                 Problem: Dressing       Dates: Start: 06/21/22         Goal: STG-Within one week, patient will dress UB at SPV level using laure technique.       Dates: Start: 06/21/22         Goal Note filed on 06/22/22 1136 by Michelle Albarran Student       New admit, evaluated on 6/21              Goal: STG-Within one week, patient will dress LB at SPV level using laure technique        Dates: Start: 06/21/22         Goal Note filed on 06/22/22 1136 by Michelle Albarran, Student       New admit, evaluated on 6/21                 Problem: Functional Transfers       Dates: Start: 06/21/22         Goal: STG-Within one week, patient will transfer to toilet at SPV level        Dates: Start: 06/21/22         Goal Note filed on 06/22/22 1136 by Michelle Albarran Student       New admit, evaluated on 6/21                 Problem: OT Long Term Goals       Dates: Start: 06/21/22         Goal: LTG-By discharge, patient will complete basic self care tasks at Mod I - Ind level.       Dates: Start: 06/21/22            Goal: LTG-By discharge, patient will  perform bathroom transfers at Mod I - Ind level.       Dates: Start: 06/21/22            Goal: LTG-By discharge, patient will complete basic home management at Mod I - Ind level.       Dates: Start: 06/21/22

## 2022-06-28 NOTE — PROGRESS NOTES
Hospital Medicine Daily Progress Note      Chief Complaint:  Hypertension  Afib  Diabetes    Interval History:  No complaints.  Doing ok.    Review of Systems  Review of Systems   Constitutional: Negative for chills and fever.   Respiratory: Negative for shortness of breath.    Cardiovascular: Negative for chest pain.   Gastrointestinal: Negative for abdominal pain, diarrhea, nausea and vomiting.   Psychiatric/Behavioral: The patient is not nervous/anxious.         Physical Exam  Temp:  [36.6 °C (97.8 °F)-36.7 °C (98.1 °F)] 36.6 °C (97.8 °F)  Pulse:  [65-89] 84  Resp:  [18] 18  BP: (104-158)/(65-82) 132/78  SpO2:  [96 %-98 %] 96 %    Physical Exam  Vitals and nursing note reviewed.   Constitutional:       Appearance: Normal appearance.   HENT:      Head: Atraumatic.   Eyes:      Conjunctiva/sclera: Conjunctivae normal.      Pupils: Pupils are equal, round, and reactive to light.   Cardiovascular:      Rate and Rhythm: Normal rate. Rhythm irregular.      Heart sounds: No murmur heard.  Pulmonary:      Effort: Pulmonary effort is normal.      Breath sounds: No stridor. No wheezing or rales.   Abdominal:      General: There is no distension.      Palpations: Abdomen is soft.      Tenderness: There is no abdominal tenderness.   Musculoskeletal:      Cervical back: Normal range of motion and neck supple.      Right lower leg: No edema.      Left lower leg: No edema.   Skin:     General: Skin is warm and dry.      Findings: No rash.   Neurological:      Mental Status: He is alert and oriented to person, place, and time.   Psychiatric:         Mood and Affect: Mood normal.         Behavior: Behavior normal.                   Assessment/Plan  * Ischemic stroke (HCC)- (present on admission)  Assessment & Plan  On ASA, Plavix, and Lipitor    Vitamin D deficiency  Assessment & Plan  Vit D: 8  On supplements    Coronary artery disease involving native coronary artery of native heart without angina pectoris- (present on  admission)  Assessment & Plan  Hx cardiac stent  On ASA, Plavix  On Lipitor  On Core and Losartan    Paroxysmal atrial fibrillation (HCC)- (present on admission)  Assessment & Plan  HR ok  On Coreg  Note: ZioPatch placed per Physiatry    Type 2 diabetes mellitus with hyperglycemia, with long-term current use of insulin (HCC)- (present on admission)  Assessment & Plan  Hba1c: 9.8 (6/22)  -171 (hit 200 x 1)  On Glargine  Note: Home meds include Metformin and Lantus  Note: Pt agreeable to discharging on Lantus only without resuming Metformin  Monitor    Primary hypertension- (present on admission)  Assessment & Plan  BP a little labile and occ rises up a little  On Norvasc  On Coreg  On Cozaar  On Hydralazine -- dose recently increased  Cont to monitor

## 2022-06-28 NOTE — CARE PLAN
Problem: Bowel Elimination  Goal: Patient will participate in bowel management program  Note: Scheduled senna given at hs.Continent of bowel.LBM 6/27.Will continue to monitor.     Problem: Diabetes Management  Goal: Patient's ability to maintain appropriate glucose levels will be maintained or improve  Note: FSBS 200 at hs, coverage given.Refused snack.Will continue to monitor and assess for s/sx of hyper/hypoglycemia.     Problem: Fall Risk - Rehab  Goal: Patient will remain free from falls  Note: Leia Queen Fall risk Assessment Score:9      Low fall risk interventions   - Call light within reach   - Yellow  socks   - Belongings within reach   - Bed in the lowest position

## 2022-06-28 NOTE — THERAPY
Occupational Therapy  Daily Treatment     Patient Name: Reji Madrigal Jr.  Age:  56 y.o., Sex:  male  Medical Record #: [id number]  Today's Date: 6/28/2022     Precautions  Precautions: (P) Fall Risk  Comments: (P) ZIO Patch         Subjective    Pt motivated to work on RUE FMC/.     Objective       06/28/22 1331   OT Charge Group   OT Self Care / ADL 1   OT Neuromuscular Re-education / Balance 3   OT Total Time Spent   OT Individual Total Time Spent (Mins) 60   Precautions   Precautions Fall Risk   Comments ZIO Patch   Functional Level of Assist   Grooming Modified Independent;Standing   Toileting Modified Independent   Toileting Description Adaptive equipment;Grab bar;Increased time  (4WW)   Bed, Chair, Wheelchair Transfer Modified Independent   Bed Chair Wheelchair Transfer Description Adaptive equipment  (4WW)   Toilet Transfers Modified Independent   Toilet Transfer Description Adaptive equipment;Grab bar;Increased time  (4WW)   Hand Strengthening   Hand Strengthening Right ;Right Pinch;Gross Grasp Right   Comment See note for tx details.   Fine Motor / Dexterity    Fine Motor / Dexterity Yes   Fine Motor / Dexterity Interventions see note for tx details.   Bed Mobility    Supine to Sit Modified Independent   Sit to Supine Modified Independent   Scooting Modified Independent   Rolling Modified Independent   Interdisciplinary Plan of Care Collaboration   Patient Position at End of Therapy Edge of Bed;Call Light within Reach;Tray Table within Reach;Phone within Reach     Activities both seated/standing at tabletop to address RUE neuro re-ed, Grasp/release and FMC:  -in-hand manipulation of dice to place on number given prompt x15 reps  -reach/grasp/release of cones and cups with focus on cylindrical grasp and exaggerating finger ext, 2x10 reps  -typing 5 word sentences x6, use of index finger only  -placement of various sized rubber washers on vertical rungs x30 reps with use of tip pinch or tripod  pinch with tweezers  -finger isolation activity at piano with R wrist support and initial hand over hand assistance to initiate single key press/release    Provided pt with handout of RUE functional grasp and FMC activities to complete at d/c. Also provided pt with website resources for typing and mousework exercises, as he reports that he would like to return to work ASAP. Also discussed use of voice to text until typing skills progress.     Assessment    Pt con't to progress with RUE functional use and demonstrates improved finger extension. He con't to demonstrate fatigue with repetition and difficulty with activities requiring in-hand manipulation and finger isolation. He will benefit from ongoing OT services in OP setting to con't to increase independence with IADLs, return to work, and RUE functional use.    Strengths: Able to follow instructions, Alert and oriented, Effective communication skills, Good insight into deficits/needs, Independent prior level of function, Pleasant and cooperative, Willingly participates in therapeutic activities, Motivated for self care and independence  Barriers: Fatigue, Generalized weakness, Hemiparesis, Home accessibility, Hypertension, Impaired balance, Impaired activity tolerance, Poor family support (diabetes, distal RUE weakness)    Plan    D/c tomorrow with OP OT.     Passport items to be completed:  Completed.     Occupational Therapy Goals (Active)       Problem: Bathing       Dates: Start: 06/21/22         Goal: STG-Within one week, patient will bathe at SPV level.        Dates: Start: 06/21/22         Goal Note filed on 06/22/22 1136 by Michelle Albarran, Student       New admit, evaluated on 6/21                 Problem: Dressing       Dates: Start: 06/21/22         Goal: STG-Within one week, patient will dress UB at SPV level using laure technique.       Dates: Start: 06/21/22         Goal Note filed on 06/22/22 1136 by Michelle Albarran,  Taina       New admit, evaluated on 6/21              Goal: STG-Within one week, patient will dress LB at SPV level using laure technique        Dates: Start: 06/21/22         Goal Note filed on 06/22/22 1136 by Michelle Albarran, Student       New admit, evaluated on 6/21                 Problem: Functional Transfers       Dates: Start: 06/21/22         Goal: STG-Within one week, patient will transfer to toilet at SPV level        Dates: Start: 06/21/22         Goal Note filed on 06/22/22 1136 by Michelle Albarran, Student       New admit, evaluated on 6/21                 Problem: OT Long Term Goals       Dates: Start: 06/21/22         Goal: LTG-By discharge, patient will complete basic self care tasks at Mod I - Ind level.       Dates: Start: 06/21/22            Goal: LTG-By discharge, patient will perform bathroom transfers at Mod I - Ind level.       Dates: Start: 06/21/22            Goal: LTG-By discharge, patient will complete basic home management at Mod I - Ind level.       Dates: Start: 06/21/22

## 2022-06-28 NOTE — CARE PLAN
Problem: Bathing  Goal: STG-Within one week, patient will bathe at SPV level.   Outcome: Met     Problem: Dressing  Goal: STG-Within one week, patient will dress UB at SPV level using laure technique.  Outcome: Met  Goal: STG-Within one week, patient will dress LB at SPV level using laure technique   Outcome: Met     Problem: Functional Transfers  Goal: STG-Within one week, patient will transfer to toilet at SPV level   Outcome: Met     Problem: OT Long Term Goals  Goal: LTG-By discharge, patient will complete basic self care tasks at Mod I - Ind level.  Outcome: Met  Goal: LTG-By discharge, patient will perform bathroom transfers at Mod I - Ind level.  Outcome: Met     Problem: OT Long Term Goals  Goal: LTG-By discharge, patient will complete basic home management at Mod I - Ind level.  Outcome: Discharged - Not Met  Note: Will require supv at d/c.

## 2022-06-28 NOTE — PROGRESS NOTES
"Rehab Progress Note     Date of Service: 6/28/2022  Chief Complaint: follow up stroke    Interval Events (Subjective)    Patient seen and examined today in his room.  We confirmed he would like to use [facility] to beds.  He does need a glucometer.  He feels ready for discharge home tomorrow.  If feels like his hand has returned about 10% of its function.  He is able to feed himself but cannot type.  His biggest concern his his ability to return to driving.  He is going to discharge to a hotel and look for a place to live in [place] that is near his workplace.  He has no new concerns or complaints.    ROS: No changes to bowel, bladder, pain, mood, or sleep.         Objective:  VITAL SIGNS: /78   Pulse 84   Temp 36.6 °C (97.8 °F) (Oral)   Resp 18   Ht 1.803 m (5' 11\")   Wt 80 kg (176 lb 5.9 oz)   SpO2 96%   BMI 24.60 kg/m²   Gen: alert, no apparent distress  CV: Regular rate, regular rhythm, cardiac monitor on left chest wall  Resp: Clear to auscultation bilaterally  Neuro: Right arm/hand hemiparesis    Recent Results (from the past 72 hour(s))   POCT glucose device results    Collection Time: 06/25/22  5:29 PM   Result Value Ref Range    POC Glucose, Blood 132 (H) 65 - 99 mg/dL   POCT glucose device results    Collection Time: 06/25/22  8:19 PM   Result Value Ref Range    POC Glucose, Blood 172 (H) 65 - 99 mg/dL   POCT glucose device results    Collection Time: 06/26/22  7:32 AM   Result Value Ref Range    POC Glucose, Blood 117 (H) 65 - 99 mg/dL   POCT glucose device results    Collection Time: 06/26/22 11:12 AM   Result Value Ref Range    POC Glucose, Blood 193 (H) 65 - 99 mg/dL   POCT glucose device results    Collection Time: 06/26/22  5:18 PM   Result Value Ref Range    POC Glucose, Blood 123 (H) 65 - 99 mg/dL   POCT glucose device results    Collection Time: 06/26/22  9:47 PM   Result Value Ref Range    POC Glucose, Blood 138 (H) 65 - 99 mg/dL   POCT glucose device results    Collection Time: " 06/27/22  7:47 AM   Result Value Ref Range    POC Glucose, Blood 134 (H) 65 - 99 mg/dL   POCT glucose device results    Collection Time: 06/27/22 11:18 AM   Result Value Ref Range    POC Glucose, Blood 171 (H) 65 - 99 mg/dL   POCT glucose device results    Collection Time: 06/27/22  5:30 PM   Result Value Ref Range    POC Glucose, Blood 135 (H) 65 - 99 mg/dL   POCT glucose device results    Collection Time: 06/27/22  8:49 PM   Result Value Ref Range    POC Glucose, Blood 200 (H) 65 - 99 mg/dL   POCT glucose device results    Collection Time: 06/28/22  7:43 AM   Result Value Ref Range    POC Glucose, Blood 137 (H) 65 - 99 mg/dL   POCT glucose device results    Collection Time: 06/28/22 11:24 AM   Result Value Ref Range    POC Glucose, Blood 165 (H) 65 - 99 mg/dL       Current Facility-Administered Medications   Medication Frequency   • hydrALAZINE (APRESOLINE) tablet 25 mg Q8HRS   • insulin GLARGINE (Lantus,Semglee) injection QAM INSULIN   • vitamin D2 (Ergocalciferol) (Drisdol) capsule 50,000 Units Q7 DAYS   • insulin regular (HumuLIN R,NovoLIN R) injection 4X/DAY ACHS   • hydrOXYzine HCl (ATARAX) tablet 50 mg Q6HRS PRN   • melatonin tablet 3 mg HS PRN   • Respiratory Therapy Consult Continuous RT   • Pharmacy Consult Request ...Pain Management Review 1 Each PHARMACY TO DOSE   • hydrALAZINE (APRESOLINE) tablet 10 mg Q8HRS PRN   • acetaminophen (Tylenol) tablet 650 mg Q4HRS PRN   • lactulose 20 GM/30ML solution 30 mL QDAY PRN   • artificial tears ophthalmic solution 1 Drop PRN   • benzocaine-menthol (Cepacol) lozenge 1 Lozenge Q2HRS PRN   • mag hydrox-al hydrox-simeth (MAALOX PLUS ES or MYLANTA DS) suspension 20 mL Q2HRS PRN   • ondansetron (ZOFRAN ODT) dispertab 4 mg 4X/DAY PRN    Or   • ondansetron (ZOFRAN) syringe/vial injection 4 mg 4X/DAY PRN   • traZODone (DESYREL) tablet 50 mg QHS PRN   • sodium chloride (OCEAN) 0.65 % nasal spray 2 Spray PRN   • amLODIPine (NORVASC) tablet 10 mg Q DAY   • aspirin EC (ECOTRIN)  tablet 81 mg DAILY   • atorvastatin (LIPITOR) tablet 80 mg Q EVENING   • carvedilol (COREG) tablet 25 mg BID WITH MEALS   • clopidogrel (PLAVIX) tablet 75 mg DAILY   • enoxaparin (Lovenox) inj 40 mg DAILY AT 1800   • losartan (COZAAR) tablet 100 mg Q DAY   • senna-docusate (PERICOLACE or SENOKOT S) 8.6-50 MG per tablet 2 Tablet BID    And   • polyethylene glycol/lytes (MIRALAX) PACKET 1 Packet QDAY PRN    And   • magnesium hydroxide (MILK OF MAGNESIA) suspension 30 mL QDAY PRN    And   • bisacodyl (DULCOLAX) suppository 10 mg QDAY PRN       Orders Placed This Encounter   Procedures   • Diet Order Diet: Consistent CHO (Diabetic) (please put utensils with built up , t- rocker knife and plate guard on tray for all meals.)     Standing Status:   Standing     Number of Occurrences:   1     Order Specific Question:   Diet:     Answer:   Consistent CHO (Diabetic) [4]     Comments:   please put utensils with built up , t- rocker knife and plate guard on tray for all meals.       Assessment:    This patient is a 56 y.o. male admitted for acute inpatient rehabilitation with Ischemic stroke (HCC).    I led and attended the weekly conference, and agree with the IDT conference documentation and plan of care as noted below.    Date of conference: 6/22/2022    Goals and barriers: See IDT note.    Biggest barriers: Right hand weakness, is right-hand dominant, impaired attention, impaired balance, [place] [facility] [place]    CM[place]social support: plan to discharge to [place] with friend    Anticipated DC date: 6/29    Outpatient: OT/PT    Equip: SPC, shower chair    Follow up: PCP, [facility], Dr. Silverman, cardiology    Rx: meds to beds        Problem List/Medical Decision Making and Plan:    Left corona radiata ischemic stroke  Right hemiparesis, mostly right arm/hand  Dominant  Continue full rehab program  PT/OT/SLP, 1 hr each discipline, 5 days per week  6/21: SLP to decrease to 30, other  30 for OT as greatest impairment is his right hand    Aspirin and Plavix x 3 weeks, then aspirin alone starting 7/12  Statin    Cardiac monitor placed 6/21 to check for atrial fibrillation (patient with history of), though this stroke is more likely lacunar, not activated until 6/23, mail back on 7/6    Outpatient follow up with neurology, Dr. Silverman, cardiology, referrals made    Diabetes type 2 with hyperglycemia  Uncontrolled, HgbA1C 9.8  Glargine  Sliding scale insulin  Consult hospitalist  Patient needs glucometer at discharge, ordered  Used to be on Metformin, no longer needed per hospitalist      Diabetic peripheral neuropathy  Monitor need for gabapentin  No pain currently     Hypertension  Losartan  Carvedilol  Amlodipine  Hydralazine added   Appreciate hospitalist assistance     Paroxysmal atrial fibrillation  Carvedilol  Cardiac monitor placed 6/21, activated 6/23, mail back on 7/6  Outpatient follow up with cardiology    Hypokalemia, resolved  Supplementation     Coronary artery disease  Status post stents 2014 placed in [place]  Aspirin (on prior to admission)  Plavix (just for now as DAPT for stroke)    Vit D deficiency, 8  Continue high dose supplementation    Bowel program  Continue bowel medications  Last BM 6/28    Bladder program  Check PVRs - 26, 61  Not retaining       DVT prophylaxis  Lovenox    Total time:  36 minutes.  I spent greater than 50% of the time for patient care, counseling, and coordination on this date, including patient face-to face time, unit/floor time with review of records/pertinent lab data and studies, as well as discussing diagnostic evaluation/work up, planned therapeutic interventions, and future disposition of care, as per the interval events/subjective and the assessment and plan as noted above.    Time today spent preparing for tomorrow's discharge, discussing discharge medications, verifying pharmacy choice, need for prior authorization for medications, discussing  follow up with the outpatient providers, as well as answering any discharge related questions.    I have performed a physical exam, reviewed and updated ROS, as well as the assessment and plan today 6/28/2022. In review of note from 6/27/2022 there are no new changes except as documented above.                Aurora Gonzalez M.D.   Physical Medicine and Rehabilitation

## 2022-06-28 NOTE — CARE PLAN
Problem: Psychosocial  Goal: Patient's level of anxiety will decrease  Outcome: Progressing     Problem: Bladder / Voiding  Goal: Patient will establish and maintain regular urinary output  Outcome: Progressing

## 2022-06-29 ENCOUNTER — PHARMACY VISIT (OUTPATIENT)
Dept: PHARMACY | Facility: MEDICAL CENTER | Age: 57
End: 2022-06-29
Payer: COMMERCIAL

## 2022-06-29 VITALS
TEMPERATURE: 98.2 F | DIASTOLIC BLOOD PRESSURE: 67 MMHG | WEIGHT: 176.37 LBS | OXYGEN SATURATION: 97 % | RESPIRATION RATE: 18 BRPM | BODY MASS INDEX: 24.69 KG/M2 | HEART RATE: 74 BPM | SYSTOLIC BLOOD PRESSURE: 106 MMHG | HEIGHT: 71 IN

## 2022-06-29 LAB
GLUCOSE BLD STRIP.AUTO-MCNC: 143 MG/DL (ref 65–99)
GLUCOSE BLD STRIP.AUTO-MCNC: 160 MG/DL (ref 65–99)

## 2022-06-29 PROCEDURE — 82962 GLUCOSE BLOOD TEST: CPT

## 2022-06-29 PROCEDURE — A9270 NON-COVERED ITEM OR SERVICE: HCPCS | Performed by: HOSPITALIST

## 2022-06-29 PROCEDURE — 99239 HOSP IP/OBS DSCHRG MGMT >30: CPT | Performed by: PHYSICAL MEDICINE & REHABILITATION

## 2022-06-29 PROCEDURE — A9270 NON-COVERED ITEM OR SERVICE: HCPCS | Performed by: PHYSICAL MEDICINE & REHABILITATION

## 2022-06-29 PROCEDURE — 700102 HCHG RX REV CODE 250 W/ 637 OVERRIDE(OP): Performed by: PHYSICAL MEDICINE & REHABILITATION

## 2022-06-29 PROCEDURE — RXMED WILLOW AMBULATORY MEDICATION CHARGE: Performed by: PHYSICAL MEDICINE & REHABILITATION

## 2022-06-29 PROCEDURE — 700102 HCHG RX REV CODE 250 W/ 637 OVERRIDE(OP): Performed by: HOSPITALIST

## 2022-06-29 PROCEDURE — 99231 SBSQ HOSP IP/OBS SF/LOW 25: CPT | Performed by: HOSPITALIST

## 2022-06-29 RX ADMIN — LOSARTAN POTASSIUM 100 MG: 25 TABLET, FILM COATED ORAL at 05:31

## 2022-06-29 RX ADMIN — HYDRALAZINE HYDROCHLORIDE 25 MG: 25 TABLET, FILM COATED ORAL at 05:32

## 2022-06-29 RX ADMIN — CARVEDILOL 25 MG: 12.5 TABLET, FILM COATED ORAL at 08:17

## 2022-06-29 RX ADMIN — CLOPIDOGREL BISULFATE 75 MG: 75 TABLET ORAL at 08:18

## 2022-06-29 RX ADMIN — ASPIRIN 81 MG: 81 TABLET, COATED ORAL at 08:18

## 2022-06-29 RX ADMIN — SENNOSIDES AND DOCUSATE SODIUM 2 TABLET: 50; 8.6 TABLET ORAL at 08:18

## 2022-06-29 RX ADMIN — AMLODIPINE BESYLATE 10 MG: 5 TABLET ORAL at 05:31

## 2022-06-29 ASSESSMENT — ENCOUNTER SYMPTOMS
HEADACHES: 0
DIZZINESS: 0
VOMITING: 0
FEVER: 0
HALLUCINATIONS: 0
SHORTNESS OF BREATH: 0
NAUSEA: 0
PALPITATIONS: 0
BLURRED VISION: 0

## 2022-06-29 NOTE — PROGRESS NOTES
Hospital Medicine Daily Progress Note      Chief Complaint:  Hypertension  Afib  Diabetes    Interval History:  Pt going home today.    Review of Systems  Review of Systems   Constitutional: Negative for fever.   Eyes: Negative for blurred vision.   Respiratory: Negative for shortness of breath.    Cardiovascular: Negative for palpitations.   Gastrointestinal: Negative for nausea and vomiting.   Neurological: Negative for dizziness and headaches.   Psychiatric/Behavioral: Negative for hallucinations.        Physical Exam  Temp:  [36.5 °C (97.7 °F)-36.6 °C (97.9 °F)] 36.5 °C (97.7 °F)  Pulse:  [68-87] 87  Resp:  [18] 18  BP: (112-132)/(66-81) 112/67  SpO2:  [96 %-99 %] 96 %    Physical Exam  Vitals and nursing note reviewed.   Constitutional:       General: He is not in acute distress.  HENT:      Mouth/Throat:      Mouth: Mucous membranes are moist.      Pharynx: Oropharynx is clear.   Eyes:      General: No scleral icterus.  Cardiovascular:      Rate and Rhythm: Normal rate. Rhythm irregular.      Heart sounds: No murmur heard.  Pulmonary:      Effort: Pulmonary effort is normal.      Breath sounds: Normal breath sounds. No stridor.   Abdominal:      General: There is no distension.      Palpations: Abdomen is soft.      Tenderness: There is no abdominal tenderness.   Musculoskeletal:      Cervical back: No rigidity.      Right lower leg: No edema.      Left lower leg: No edema.   Skin:     General: Skin is warm and dry.      Findings: No rash.   Neurological:      Mental Status: He is alert and oriented to person, place, and time.   Psychiatric:         Mood and Affect: Mood normal.         Behavior: Behavior normal.                   Assessment/Plan  * Ischemic stroke (HCC)- (present on admission)  Assessment & Plan  On ASA, Plavix, and Lipitor    Vitamin D deficiency- (present on admission)  Assessment & Plan  Vit D: 8  On supplements    Coronary artery disease involving native coronary artery of native heart  without angina pectoris- (present on admission)  Assessment & Plan  Hx cardiac stent  On ASA, Plavix  On Lipitor  On Coreg and Losartan    Paroxysmal atrial fibrillation (HCC)- (present on admission)  Assessment & Plan  HR ok  On Coreg  Note: ZioPatch placed per Physiatry    Type 2 diabetes mellitus with hyperglycemia, with long-term current use of insulin (HCC)- (present on admission)  Assessment & Plan  Hba1c: 9.8 (6/22)  -171 (hit 200 x 1)  On Glargine  Note: Home meds include Metformin and Lantus  Note: Pt agreeable to discharging on Lantus only without resuming Metformin  Monitor    Primary hypertension- (present on admission)  Assessment & Plan  BP recently ok  On Norvasc  On Coreg  On Cozaar  On Hydralazine -- dose recently increased  Cont to monitor

## 2022-06-29 NOTE — CARE PLAN
Problem: Diabetes Management  Goal: Patient's ability to maintain appropriate glucose levels will be maintained or improve  Note: FSBS 167 at hs, coverage given.Refused snack.Will continue to monitor.     Problem: Fall Risk - Rehab  Goal: Patient will remain free from falls  Note: Leia Queen Fall risk Assessment Score: 7    Low fall risk interventions   - Call light within reach   - Yellow  socks   - Belongings within reach   - Bed in the lowest position     Appropriately uses call light to make needs known.MOD I in room with 4WW.Will continue to monitor and assess needs and safety.

## 2022-06-29 NOTE — PROGRESS NOTES
This patient received individualized medication counseling regarding the current regimen they are receiving in this facility. Potential adverse effects, monitoring parameters, and proper administration were covered in preparation for their discharge. Blood sugar monitoring was discussed as well as the signs and symptoms of hypoglycemia as the patient is currently on Semglee. This patient is being treated for hypertension and it is recommended that they monitor and record with a blood pressure device. The patient has been instructed to contact their primary care physician if the HR or blood pressure is abnormal. The patient asked relevant questions regarding the medications for which answers were provided. Our pharmacy hours and phone number were provided for follow up questions should they arise.  Elier Ramsay  [facility]

## 2022-06-29 NOTE — PROGRESS NOTES
"Rehab Progress Note     Date of Service: 6/29/2022  Chief Complaint: follow up stroke    Interval Events (Subjective)    No interval events.  Patient for discharge home today.  Waiting for friend to arrive.     ROS: No changes to bowel, bladder, pain, mood, or sleep.       Objective:  VITAL SIGNS: /67   Pulse 87   Temp 36.5 °C (97.7 °F) (Oral)   Resp 18   Ht 1.803 m (5' 11\")   Wt 80 kg (176 lb 5.9 oz)   SpO2 96%   BMI 24.60 kg/m²   Gen: alert, no apparent distress  Neuro: notable for right hand weakness    Recent Results (from the past 72 hour(s))   POCT glucose device results    Collection Time: 06/26/22  5:18 PM   Result Value Ref Range    POC Glucose, Blood 123 (H) 65 - 99 mg/dL   POCT glucose device results    Collection Time: 06/26/22  9:47 PM   Result Value Ref Range    POC Glucose, Blood 138 (H) 65 - 99 mg/dL   POCT glucose device results    Collection Time: 06/27/22  7:47 AM   Result Value Ref Range    POC Glucose, Blood 134 (H) 65 - 99 mg/dL   POCT glucose device results    Collection Time: 06/27/22 11:18 AM   Result Value Ref Range    POC Glucose, Blood 171 (H) 65 - 99 mg/dL   POCT glucose device results    Collection Time: 06/27/22  5:30 PM   Result Value Ref Range    POC Glucose, Blood 135 (H) 65 - 99 mg/dL   POCT glucose device results    Collection Time: 06/27/22  8:49 PM   Result Value Ref Range    POC Glucose, Blood 200 (H) 65 - 99 mg/dL   POCT glucose device results    Collection Time: 06/28/22  7:43 AM   Result Value Ref Range    POC Glucose, Blood 137 (H) 65 - 99 mg/dL   POCT glucose device results    Collection Time: 06/28/22 11:24 AM   Result Value Ref Range    POC Glucose, Blood 165 (H) 65 - 99 mg/dL   POCT glucose device results    Collection Time: 06/28/22  5:12 PM   Result Value Ref Range    POC Glucose, Blood 111 (H) 65 - 99 mg/dL   POCT glucose device results    Collection Time: 06/28/22  8:36 PM   Result Value Ref Range    POC Glucose, Blood 167 (H) 65 - 99 mg/dL   POCT " glucose device results    Collection Time: 06/29/22  7:25 AM   Result Value Ref Range    POC Glucose, Blood 143 (H) 65 - 99 mg/dL   POCT glucose device results    Collection Time: 06/29/22 11:16 AM   Result Value Ref Range    POC Glucose, Blood 160 (H) 65 - 99 mg/dL       Current Facility-Administered Medications   Medication Frequency   • hydrALAZINE (APRESOLINE) tablet 25 mg Q8HRS   • insulin GLARGINE (Lantus,Semglee) injection QAM INSULIN   • vitamin D2 (Ergocalciferol) (Drisdol) capsule 50,000 Units Q7 DAYS   • insulin regular (HumuLIN R,NovoLIN R) injection 4X/DAY ACHS   • hydrOXYzine HCl (ATARAX) tablet 50 mg Q6HRS PRN   • melatonin tablet 3 mg HS PRN   • Respiratory Therapy Consult Continuous RT   • Pharmacy Consult Request ...Pain Management Review 1 Each PHARMACY TO DOSE   • hydrALAZINE (APRESOLINE) tablet 10 mg Q8HRS PRN   • acetaminophen (Tylenol) tablet 650 mg Q4HRS PRN   • lactulose 20 GM/30ML solution 30 mL QDAY PRN   • artificial tears ophthalmic solution 1 Drop PRN   • benzocaine-menthol (Cepacol) lozenge 1 Lozenge Q2HRS PRN   • mag hydrox-al hydrox-simeth (MAALOX PLUS ES or MYLANTA DS) suspension 20 mL Q2HRS PRN   • ondansetron (ZOFRAN ODT) dispertab 4 mg 4X/DAY PRN    Or   • ondansetron (ZOFRAN) syringe/vial injection 4 mg 4X/DAY PRN   • traZODone (DESYREL) tablet 50 mg QHS PRN   • sodium chloride (OCEAN) 0.65 % nasal spray 2 Spray PRN   • amLODIPine (NORVASC) tablet 10 mg Q DAY   • aspirin EC (ECOTRIN) tablet 81 mg DAILY   • atorvastatin (LIPITOR) tablet 80 mg Q EVENING   • carvedilol (COREG) tablet 25 mg BID WITH MEALS   • clopidogrel (PLAVIX) tablet 75 mg DAILY   • enoxaparin (Lovenox) inj 40 mg DAILY AT 1800   • losartan (COZAAR) tablet 100 mg Q DAY   • senna-docusate (PERICOLACE or SENOKOT S) 8.6-50 MG per tablet 2 Tablet BID    And   • polyethylene glycol/lytes (MIRALAX) PACKET 1 Packet QDAY PRN    And   • magnesium hydroxide (MILK OF MAGNESIA) suspension 30 mL QDAY PRN    And   • bisacodyl  (DULCOLAX) suppository 10 mg QDAY PRN       Orders Placed This Encounter   Procedures   • Diet Order Diet: Consistent CHO (Diabetic) (please put utensils with built up , t- rocker knife and plate guard on tray for all meals.)     Standing Status:   Standing     Number of Occurrences:   1     Order Specific Question:   Diet:     Answer:   Consistent CHO (Diabetic) [4]     Comments:   please put utensils with built up , t- rocker knife and plate guard on tray for all meals.       Assessment:    This patient is a 56 y.o. male admitted for acute inpatient rehabilitation with Ischemic stroke (HCC).    I led and attended the weekly conference, and agree with the IDT conference documentation and plan of care as noted below.    Date of conference: 6/22/2022    Goals and barriers: See IDT note.    Biggest barriers: Right hand weakness, is right-hand dominant, impaired attention, impaired balance, stairs at his friend's home in [facility]    CM/social support: plan to discharge to [place] with friend    Anticipated DC date: 6/29    Outpatient: OT/PT    Equip: SPC, shower chair, FWW    Follow up: PCP, [facility], Dr. Silverman, cardiology    Rx: meds to beds        Problem List/Medical Decision Making and Plan:    Left corona radiata ischemic stroke  Right hemiparesis, mostly right arm/hand  Dominant  Continue full rehab program  PT/OT/SLP, 1 hr each discipline, 5 days per week  6/21: SLP to decrease to 30, other 30 for OT as greatest impairment is his right hand    Aspirin and Plavix x 3 weeks, then aspirin alone starting 7/12  Statin    Cardiac monitor placed 6/21 to check for atrial fibrillation (patient with history of), though this stroke is more likely lacunar, not activated until 6/23, mail back on 7/6    Outpatient follow up with neurology, Dr. Silverman, cardiology, referrals made    Diabetes type 2 with hyperglycemia  Uncontrolled, HgbA1C 9.8  Glargine  Sliding scale insulin  Consult  hospitalist  Patient needs glucometer at discharge, ordered  Used to be on Metformin, no longer needed per hospitalist      Diabetic peripheral neuropathy  Monitor need for gabapentin  No pain currently     Hypertension  Losartan  Carvedilol  Amlodipine  Hydralazine added   Appreciate hospitalist assistance     Paroxysmal atrial fibrillation  Carvedilol  Cardiac monitor placed 6/21, activated 6/23, mail back on 7/6  Outpatient follow up with cardiology    Hypokalemia, resolved  Supplementation     Coronary artery disease  Status post stents 2014 placed in [place]  Aspirin (on prior to admission)  Plavix (just for now as DAPT for stroke)    Vit D deficiency, 8  Continue high dose supplementation    Bowel program  Continue bowel medications  Last BM 6/28    Bladder program  Check PVRs - 26, 61  Not retaining       DVT prophylaxis  Lovenox              Aurora Gonzalez M.D.   Physical Medicine and Rehabilitation

## 2022-06-29 NOTE — PROGRESS NOTES
Patient discharged to home per order.  Discharge instructions reviewed with patient and S/O they verbalize understanding and signed copies placed in chart.  Patient has all belongings; signed copy of form in chart.  Patient left facility at 1455  via FWW accompanied by rehab staff and S/O.  Have enjoyed working with this pleasant patient.

## 2022-06-29 NOTE — DISCHARGE SUMMARY
"Admission Date: 6/20/2022    Discharge Date: 6/29/2022    Attending Provider: Aurora Gonzalez MD    Admission Diagnosis:   Active Hospital Problems    Diagnosis    • *Ischemic stroke (HCC)    • Azotemia    • Vitamin D deficiency    • Hypokalemia    • Impaired mobility and ADLs    • Other hyperlipidemia    • Paroxysmal atrial fibrillation (HCC)    • Diabetic peripheral neuropathy (HCC)    • Coronary artery disease involving native coronary artery of native heart without angina pectoris    • Type 2 diabetes mellitus with hyperglycemia, with long-term current use of insulin (HCC)    • Primary hypertension        Discharge Diagnosis:  Active Hospital Problems    Diagnosis    • *Ischemic stroke (HCC)    • Azotemia    • Vitamin D deficiency    • Hypokalemia    • Impaired mobility and ADLs    • Other hyperlipidemia    • Paroxysmal atrial fibrillation (HCC)    • Diabetic peripheral neuropathy (HCC)    • Coronary artery disease involving native coronary artery of native heart without angina pectoris    • Type 2 diabetes mellitus with hyperglycemia, with long-term current use of insulin (HCC)    • Primary hypertension        HPI per H&P:    Per Dr. Delgadillo's original consult \" The patient is a 56 y.o. right hand dominant male with a past medical history of diabetes, hypertension, stroke 5 days prior to presentation;  who presented on 6/3/2022 10:12 PM after leaving [place] from [facility].  Patient was unhappy with his medical care, wanted to come to [facility] for [facility].     Paper records received from [facility] reflect 2 areas of acute infarction in the left corona radiata, initial NIH score 6.  Patient was admitted, started on aspirin 325 mg daily, Plavix 75 mg daily for 3 weeks, and atorvastatin 80 mg for secondary stroke prevention.\"     Patient current reports slowly improving right-sided weakness however his hand is still quite impaired and he is right-hand dominant.  In addition to be done " history patient also has diabetic peripheral neuropathy and uses insulin at home.  Patient also reports a history of intermittent atrial fibrillation and this is confirmed via chart review.  Patient also has a history of myocardial infarction for which he has 2 stents placed.  Per review of records he was previously on lisinopril and metoprolol.     Patient was evaluated by Rehab Medicine physician and Physical Therapy, Occupational Therapy and Speech Therapy and determined to be appropriate for acute inpatient rehab and was transferred to [facility] on 6/20/2022 12:18 PM.    Rehab Hospital Course by Problem List:    Left corona radiata ischemic stroke  Right hemiparesis, mostly right arm/hand  Dominant  High level cognitive impairment, resolved     Aspirin and Plavix x 3 weeks, then aspirin alone starting 7/12  Statin     Cardiac monitor placed 6/21 to check for atrial fibrillation (patient with history of), though this stroke is more likely lacunar, not activated until 6/23, mail back on 7/7     Outpatient follow up with neurology, Dr. Silverman, cardiology, referrals made     Diabetes type 2 with hyperglycemia  Uncontrolled, HgbA1C 9.8  Glargine  Sliding scale insulin  Patient needs glucometer at discharge, ordered  Used to be on Metformin, no longer needed per hospitalist      Diabetic peripheral neuropathy  Monitor need for gabapentin  No pain currently     Hypertension  Losartan  Carvedilol  Amlodipine  Hydralazine added      Paroxysmal atrial fibrillation, history of  Carvedilol  Cardiac monitor placed 6/21, activated 6/23, mail back on 7/7  Outpatient follow up with cardiology     Hypokalemia, resolved  Supplementation     Coronary artery disease  Status post stents 2014 placed in [place]  Aspirin (on prior to admission)  Plavix (just for now as DAPT for stroke)     Vit D deficiency, 8  Continue high dose supplementation     Bowel program  Continue bowel medications  Last BM 6/28     Bladder  program  Check PVRs - 26, 61  Not retaining        DVT prophylaxis  Lovenox    Labs    Lab Results   Component Value Date/Time    SODIUM 142 06/25/2022 05:18 AM    POTASSIUM 3.6 06/25/2022 05:18 AM    CHLORIDE 109 06/25/2022 05:18 AM    CO2 22 06/25/2022 05:18 AM    GLUCOSE 131 (H) 06/25/2022 05:18 AM    BUN 20 06/25/2022 05:18 AM    CREATININE 1.04 06/25/2022 05:18 AM      Lab Results   Component Value Date/Time    WBC 5.9 06/25/2022 05:18 AM    RBC 4.65 (L) 06/25/2022 05:18 AM    HEMOGLOBIN 13.7 (L) 06/25/2022 05:18 AM    HEMATOCRIT 40.9 (L) 06/25/2022 05:18 AM    MCV 88.0 06/25/2022 05:18 AM    MCH 29.5 06/25/2022 05:18 AM    MCHC 33.5 (L) 06/25/2022 05:18 AM    MPV 11.3 06/25/2022 05:18 AM    NEUTSPOLYS 49.10 06/21/2022 05:30 AM    LYMPHOCYTES 40.30 06/21/2022 05:30 AM    MONOCYTES 8.50 06/21/2022 05:30 AM    EOSINOPHILS 1.40 06/21/2022 05:30 AM    BASOPHILS 0.50 06/21/2022 05:30 AM          Functional Status at Discharge  Eating:  Modified Independent  Eating Description:  Adaptive equipment  Grooming:  Modified Independent, Standing  Grooming Description:  Seated in wheelchair at sink  Bathing:  Modified Independent  Bathing Description:  Grab bar, Hand held shower, Tub bench, Increased time (per pt/nursing report)  Upper Body Dressing:  Modified Independent  Upper Body Dressing Description:  Increased time  Lower Body Dressing:  Modified Independent  Lower Body Dressing Description:  Increased time  Discharge Location : Other (See Comments) (temporarily staying in hotel while looking for new apartment in [facility]  Patient Discharging with Assist of: Friend  Level of Supervision Required: Intermittent Supervision  Recommended Equipment for Discharge: 4-Wheeled Walker;Shower Chair;Grab Bars by Toilet;Grab Bars in Tub / Shower  Recommended Services Upon Discharge: Outpatient Occupational Therapy  Long Term Goals Met: 2  Long Term Goals Not Met: 1  Reason(s) for Goals Not Met: IADLs not address  during rehab stay; pt mod I with ADLs and bathroom txfrs.  Criteria for Termination of Services: Maximum Function Achieved for Inpatient Rehabilitation  Walk:  Modified Independent  Distance Walked:  250  Number of Times Distance Was Traveled:  2  Assistive Device:  4 Wheel Walker  Gait Deviation:  Decreased Heel Strike, Decreased Base Of Support  Wheelchair:     Distance Propelled:      Wheelchair Description:     Stairs Supervised  Stairs Description Extra time, Hand rails, Supervision for safety  Discharge Location: Other (See Comments) (hotel room)  Patient Discharging with Assist of: Friend  Level of Supervision Required Upon Discharge: Intermittent Supervision  Recommended Equipment for Discharge: 4-Wheeled Walker  Recommeded Services Upon Discharge: Outpatient Physical Therapy  Long Term Goals Met: 2  Long Term Goals Not Met: 2  Reason(s) for Goals Not Met: pt's currently using 4WW instead of SPC  Criteria for Termination of Services: Maximum Function Achieved for Inpatient Rehabilitation  Comprehension:  Modified Independent  Comprehension Description:  Glasses  Expression:  Independent  Expression Description:     Social Interaction:  Independent  Social Interaction Description:     Problem Solving:  Supervision  Problem Solving Description:     Memory:  Minimal Assist  Memory Description:          IAurora M.D., personally performed a complete drug regimen review and no potential clinically significant medication issues were identified.     Discharge Medication:     Medication List      START taking these medications      Instructions   BD Pen Needle Nancy U/F  Generic drug: Insulin Pen Needle 32 G x 4 mm   Doctor's comments: Per patient/formulary preference. ICD-10 code: E11.65 Uncontrolled type 2 Diabetes Mellitus  Use one pen needle in pen device to inject insulin once daily.     hydrALAZINE 25 MG Tabs  Commonly known as: APRESOLINE   Take 1 Tablet by mouth every 8 hours.  Dose: 25 mg      Semglee (yfgn) 100 UNIT/ML Sopn  Generic drug: Insulin Glargine-yfgn   Inject 20 Units under the skin every morning.  Dose: 20 Units     senna-docusate 8.6-50 MG Tabs  Commonly known as: PERICOLACE or SENOKOT S   Take 2 Tablets by mouth 2 times a day.  Dose: 2 Tablet     TechLite Lancets Misc   Doctor's comments: Or per formulary preference. ICD-10 code: E11.65 Uncontrolled type 2 Diabetes Mellitus  Use one per insurance formulary lancet to test blood sugar three times daily before meals.     True Metrix Blood Glucose Test strip  Generic drug: glucose blood   Doctor's comments: Or per formulary preference. ICD-10 code: E11.65 Uncontrolled type 2 Diabetes Mellitus  Use one per insurance formulary strip to test blood sugar three times daily before meals     True Metrix Meter w/Device Kit   Doctor's comments: Or per formulary preference. ICD-10 code: E11.65 Uncontrolled type 2 Diabetes Mellitus  Test blood sugar as directed by provider.     vitamin D2 (Ergocalciferol) 1.25 MG (11693 UT) Caps capsule  Start taking on: July 5, 2022  Commonly known as: Drisdol   Take 1 Capsule by mouth every 7 days for 4 doses.  Dose: 50,000 Units        CONTINUE taking these medications      Instructions   Adult Aspirin Regimen 81 MG EC tablet  Generic drug: aspirin   Take 1 Tablet by mouth every day.  Dose: 81 mg     amLODIPine 10 MG Tabs  Commonly known as: NORVASC   Take 1 Tablet by mouth every day.  Dose: 10 mg     atorvastatin 80 MG tablet  Commonly known as: LIPITOR   Take 1 Tablet by mouth every evening.  Dose: 80 mg     carvedilol 25 MG Tabs  Commonly known as: COREG   Take 1 Tablet by mouth 2 times a day with meals.  Dose: 25 mg     clopidogrel 75 MG Tabs  Commonly known as: PLAVIX   Take 1 Tablet by mouth every day for 14 days.  Dose: 75 mg     losartan 100 MG Tabs  Commonly known as: COZAAR   Take 1 Tablet by mouth every day.  Dose: 100 mg        STOP taking these medications    docusate sodium 100 MG Caps     insulin  GLARGINE 100 UNIT/ML Soln  Commonly known as: LantusSemglee            Discharge Diet:  Diabetic    Discharge Activity:  Do not return to work or driving until cleared by a physician.         Disposition:  Patient to discharge home with friend support and community resources.     Equipment:  Follow-up & Discharge Instructions:  Outpatient: OT/PT     Equip: SPC, shower chair     Follow up: PCP, [facility], Dr. Silverman, cardiology    Condition on Discharge:  Good    More than 35 minutes was spent on discharging this patient, including face-to-face time, prescription management, and the dictation of this note.    Aurora Gonzalez M.D.    Date of Service: 6/29/2022

## 2022-07-05 NOTE — DISCHARGE PLANNING
Patient emailed [facility] p-work to this CM.  Dr. Gonzalez completed and CM emailed completed p-work back to patient.  P-work scanned into patients chart.  CM available as needs arise.

## 2022-07-07 ENCOUNTER — TELEPHONE (OUTPATIENT)
Dept: MEDICAL GROUP | Facility: MEDICAL CENTER | Age: 57
End: 2022-07-07

## 2022-07-07 NOTE — TELEPHONE ENCOUNTER
Pt called hoping to return to work tomorrow (7/8), but his work has told him he is unable to until 9/1. Pt would like clarification on documentation if he is to stay out of work until 9/1, or if it just allows for that amount of time.    Please advise.

## 2022-07-11 NOTE — DOCUMENTATION QUERY
"                                                                         [facility]                                                                       Query Response Note      PATIENT:               EDITH FRANCIS  ACCT #:                  [id number]  MRN:                     [id number]  :                      1965  ADMIT DATE:       6/3/2022 10:12 PM  DISCH DATE:        2022 12:13 PM  RESPONDING  PROVIDER #:        [id number]           QUERY TEXT:    Please clarify clinical/diagnostic findings stated below:         The patient's clinical indicators include:  This patient was admitted for observation stay for stoke and admission to rehabilitation facility.  Consult documentation dated on 22 states \"Type 1 Diabetes Mellitus with other specified complication.  H&P and all progress notes document Type 2 Diabetes Mellitus with hyperglycemia with long term insulin use.  Please confirm the correct Diabetes diagnosis.    Clinical indicators:  long term term insulin use  hemoglobin check for A1C  Glucose levels check daily    Treatment and monitoring:  treatment with Lantus with continue sliding scale insulin monitor of CBGs  A1C hemoglobin check    Risk factors:  Patients noncompliance with medication treatment  Hyperglycemia    If you have any questions regarding this query, please contact me at [email or web address].  Options provided:   -- Type 1 Diabetes Mellitus with other specified complication   -- Type 1 Diabetes Mellitus with hyperglycemia   -- Type 2 Diabetes Mellitus with hyperglycemia   -- unable to determine      Query created by: Candis Hunter on 2022 6:33 AM    RESPONSE TEXT:    Type 2 Diabetes Mellitus with hyperglycemia          Electronically signed by:  JAMIN MORA MD 2022 1:51 PM              "

## 2022-07-14 DIAGNOSIS — Z79.4 TYPE 2 DIABETES MELLITUS WITH HYPERGLYCEMIA, WITH LONG-TERM CURRENT USE OF INSULIN (HCC): ICD-10-CM

## 2022-07-14 DIAGNOSIS — E11.65 TYPE 2 DIABETES MELLITUS WITH HYPERGLYCEMIA, WITH LONG-TERM CURRENT USE OF INSULIN (HCC): ICD-10-CM

## 2022-07-14 RX ORDER — INSULIN GLARGINE-YFGN 100 [IU]/ML
20 INJECTION, SOLUTION SUBCUTANEOUS EVERY MORNING
Qty: 3 ML | Refills: 1 | Status: SHIPPED | OUTPATIENT
Start: 2022-07-14

## 2022-07-14 NOTE — PROGRESS NOTES
Patient does not have a PCP yet and needs more insulin. Prescription sent for Lantus, patient advised he needs to get a PCP asap.

## 2022-07-19 ENCOUNTER — TELEPHONE (OUTPATIENT)
Dept: CARDIOLOGY | Facility: MEDICAL CENTER | Age: 57
End: 2022-07-19
